# Patient Record
Sex: FEMALE | Race: WHITE | Employment: UNEMPLOYED | URBAN - NONMETROPOLITAN AREA
[De-identification: names, ages, dates, MRNs, and addresses within clinical notes are randomized per-mention and may not be internally consistent; named-entity substitution may affect disease eponyms.]

---

## 2016-02-26 LAB
CHOLESTEROL, TOTAL: 237 MG/DL
CHOLESTEROL/HDL RATIO: 6.8
HDLC SERPL-MCNC: 35 MG/DL (ref 35–70)
LDL CHOLESTEROL CALCULATED: 131 MG/DL (ref 0–160)
TRIGL SERPL-MCNC: 355 MG/DL
VLDLC SERPL CALC-MCNC: 71 MG/DL

## 2016-12-24 LAB
BUN BLDV-MCNC: NORMAL MG/DL
CALCIUM SERPL-MCNC: NORMAL MG/DL
CHLORIDE BLD-SCNC: NORMAL MMOL/L
CO2: NORMAL MMOL/L
CREAT SERPL-MCNC: NORMAL MG/DL
GFR CALCULATED: NORMAL
GLUCOSE BLD-MCNC: 105 MG/DL
POTASSIUM SERPL-SCNC: NORMAL MMOL/L
SODIUM BLD-SCNC: NORMAL MMOL/L

## 2017-05-25 ENCOUNTER — OFFICE VISIT (OUTPATIENT)
Dept: FAMILY MEDICINE CLINIC | Age: 82
End: 2017-05-25
Payer: MEDICARE

## 2017-05-25 VITALS
HEART RATE: 80 BPM | BODY MASS INDEX: 26.05 KG/M2 | DIASTOLIC BLOOD PRESSURE: 70 MMHG | SYSTOLIC BLOOD PRESSURE: 120 MMHG | WEIGHT: 147 LBS | HEIGHT: 63 IN

## 2017-05-25 DIAGNOSIS — C91.10 CHRONIC LYMPHATIC LEUKEMIA (HCC): ICD-10-CM

## 2017-05-25 DIAGNOSIS — E78.2 MIXED HYPERLIPIDEMIA: ICD-10-CM

## 2017-05-25 DIAGNOSIS — H40.9 GLAUCOMA OF BOTH EYES, UNSPECIFIED GLAUCOMA: ICD-10-CM

## 2017-05-25 DIAGNOSIS — I38 VALVULAR HEART DISEASE: ICD-10-CM

## 2017-05-25 DIAGNOSIS — I10 ESSENTIAL HYPERTENSION: ICD-10-CM

## 2017-05-25 PROCEDURE — 1090F PRES/ABSN URINE INCON ASSESS: CPT | Performed by: FAMILY MEDICINE

## 2017-05-25 PROCEDURE — G8420 CALC BMI NORM PARAMETERS: HCPCS | Performed by: FAMILY MEDICINE

## 2017-05-25 PROCEDURE — 1123F ACP DISCUSS/DSCN MKR DOCD: CPT | Performed by: FAMILY MEDICINE

## 2017-05-25 PROCEDURE — 4040F PNEUMOC VAC/ADMIN/RCVD: CPT | Performed by: FAMILY MEDICINE

## 2017-05-25 PROCEDURE — 99214 OFFICE O/P EST MOD 30 MIN: CPT | Performed by: FAMILY MEDICINE

## 2017-05-25 PROCEDURE — G8427 DOCREV CUR MEDS BY ELIG CLIN: HCPCS | Performed by: FAMILY MEDICINE

## 2017-05-25 PROCEDURE — G8400 PT W/DXA NO RESULTS DOC: HCPCS | Performed by: FAMILY MEDICINE

## 2017-05-25 PROCEDURE — 1036F TOBACCO NON-USER: CPT | Performed by: FAMILY MEDICINE

## 2017-05-25 RX ORDER — CALCIUM POLYCARBOPHIL 625 MG
625 TABLET ORAL DAILY
Qty: 14 TABLET | Refills: 0 | COMMUNITY
Start: 2017-05-25 | End: 2020-05-06

## 2017-05-25 RX ORDER — HYDROCHLOROTHIAZIDE 25 MG/1
TABLET ORAL
COMMUNITY
Start: 2017-05-08 | End: 2017-07-24 | Stop reason: SDUPTHER

## 2017-05-25 RX ORDER — MULTIVIT-MIN/IRON FUM/FOLIC AC 7.5 MG-4
1 TABLET ORAL DAILY
Qty: 30 TABLET | Refills: 3 | COMMUNITY
Start: 2017-05-25 | End: 2020-05-06 | Stop reason: ALTCHOICE

## 2017-05-25 RX ORDER — ASPIRIN 81 MG/1
81 TABLET ORAL DAILY
Qty: 30 TABLET | Refills: 3 | COMMUNITY
Start: 2017-05-25 | End: 2022-10-17

## 2017-05-25 RX ORDER — PYRIDOXINE HCL (VITAMIN B6) 100 MG
500 TABLET ORAL 2 TIMES DAILY
Qty: 60 CAPSULE | Refills: 3 | COMMUNITY
Start: 2017-05-25

## 2017-05-25 RX ORDER — CARVEDILOL 6.25 MG/1
TABLET ORAL
COMMUNITY
Start: 2017-05-08 | End: 2017-07-24 | Stop reason: SDUPTHER

## 2017-05-25 RX ORDER — BIMATOPROST 0.01 %
DROPS OPHTHALMIC (EYE)
COMMUNITY
Start: 2017-04-21

## 2017-05-25 RX ORDER — CHLORAL HYDRATE 500 MG
1000 CAPSULE ORAL DAILY
Qty: 90 CAPSULE | Refills: 3 | COMMUNITY
Start: 2017-05-25 | End: 2020-05-06 | Stop reason: ALTCHOICE

## 2017-05-25 RX ORDER — GREEN TEA/HOODIA GORDONII 315-12.5MG
1 CAPSULE ORAL DAILY
Qty: 60 TABLET | Refills: 0 | Status: SHIPPED | OUTPATIENT
Start: 2017-05-25 | End: 2020-05-06

## 2017-05-25 ASSESSMENT — ENCOUNTER SYMPTOMS
DIARRHEA: 0
SHORTNESS OF BREATH: 0
COUGH: 0
CHEST TIGHTNESS: 0
ABDOMINAL PAIN: 0
VOMITING: 0
NAUSEA: 0

## 2017-05-25 ASSESSMENT — PATIENT HEALTH QUESTIONNAIRE - PHQ9
SUM OF ALL RESPONSES TO PHQ9 QUESTIONS 1 & 2: 0
2. FEELING DOWN, DEPRESSED OR HOPELESS: 0
SUM OF ALL RESPONSES TO PHQ QUESTIONS 1-9: 0
1. LITTLE INTEREST OR PLEASURE IN DOING THINGS: 0

## 2017-07-03 ENCOUNTER — OFFICE VISIT (OUTPATIENT)
Dept: FAMILY MEDICINE CLINIC | Age: 82
End: 2017-07-03
Payer: MEDICARE

## 2017-07-03 VITALS
HEIGHT: 63 IN | DIASTOLIC BLOOD PRESSURE: 76 MMHG | BODY MASS INDEX: 30.48 KG/M2 | HEART RATE: 68 BPM | TEMPERATURE: 97.7 F | WEIGHT: 172 LBS | SYSTOLIC BLOOD PRESSURE: 128 MMHG

## 2017-07-03 DIAGNOSIS — J40 BRONCHITIS: Primary | ICD-10-CM

## 2017-07-03 PROCEDURE — G8400 PT W/DXA NO RESULTS DOC: HCPCS | Performed by: FAMILY MEDICINE

## 2017-07-03 PROCEDURE — G8427 DOCREV CUR MEDS BY ELIG CLIN: HCPCS | Performed by: FAMILY MEDICINE

## 2017-07-03 PROCEDURE — 1036F TOBACCO NON-USER: CPT | Performed by: FAMILY MEDICINE

## 2017-07-03 PROCEDURE — 99213 OFFICE O/P EST LOW 20 MIN: CPT | Performed by: FAMILY MEDICINE

## 2017-07-03 PROCEDURE — G8417 CALC BMI ABV UP PARAM F/U: HCPCS | Performed by: FAMILY MEDICINE

## 2017-07-03 PROCEDURE — 1090F PRES/ABSN URINE INCON ASSESS: CPT | Performed by: FAMILY MEDICINE

## 2017-07-03 PROCEDURE — 4040F PNEUMOC VAC/ADMIN/RCVD: CPT | Performed by: FAMILY MEDICINE

## 2017-07-03 PROCEDURE — 1123F ACP DISCUSS/DSCN MKR DOCD: CPT | Performed by: FAMILY MEDICINE

## 2017-07-03 RX ORDER — BENZONATATE 200 MG/1
200 CAPSULE ORAL 3 TIMES DAILY PRN
Qty: 30 CAPSULE | Refills: 0 | Status: SHIPPED | OUTPATIENT
Start: 2017-07-03 | End: 2017-07-10

## 2017-07-03 RX ORDER — ALBUTEROL SULFATE 90 UG/1
2 AEROSOL, METERED RESPIRATORY (INHALATION) EVERY 6 HOURS PRN
Qty: 1 INHALER | Refills: 3 | Status: SHIPPED | OUTPATIENT
Start: 2017-07-03 | End: 2020-05-06 | Stop reason: ALTCHOICE

## 2017-07-03 RX ORDER — AZITHROMYCIN 250 MG/1
250 TABLET, FILM COATED ORAL DAILY
Qty: 10 TABLET | Refills: 0 | Status: SHIPPED | OUTPATIENT
Start: 2017-07-03 | End: 2017-07-13

## 2017-07-03 ASSESSMENT — ENCOUNTER SYMPTOMS
TROUBLE SWALLOWING: 0
SHORTNESS OF BREATH: 0
SORE THROAT: 1
WHEEZING: 0

## 2017-07-24 RX ORDER — HYDROCHLOROTHIAZIDE 25 MG/1
TABLET ORAL
Qty: 90 TABLET | Refills: 3 | Status: SHIPPED | OUTPATIENT
Start: 2017-07-24 | End: 2018-07-17 | Stop reason: SDUPTHER

## 2017-07-24 RX ORDER — CARVEDILOL 6.25 MG/1
TABLET ORAL
Qty: 180 TABLET | Refills: 3 | Status: SHIPPED | OUTPATIENT
Start: 2017-07-24 | End: 2018-07-17 | Stop reason: SDUPTHER

## 2017-07-27 RX ORDER — FLUTICASONE PROPIONATE 50 MCG
SPRAY, SUSPENSION (ML) NASAL
Qty: 1 BOTTLE | Refills: 5 | Status: SHIPPED | OUTPATIENT
Start: 2017-07-27 | End: 2019-04-10 | Stop reason: SDUPTHER

## 2017-09-11 ENCOUNTER — OFFICE VISIT (OUTPATIENT)
Dept: PRIMARY CARE CLINIC | Age: 82
End: 2017-09-11
Payer: MEDICARE

## 2017-09-11 VITALS
RESPIRATION RATE: 16 BRPM | TEMPERATURE: 98.9 F | SYSTOLIC BLOOD PRESSURE: 140 MMHG | WEIGHT: 175 LBS | OXYGEN SATURATION: 96 % | BODY MASS INDEX: 31.01 KG/M2 | HEART RATE: 77 BPM | HEIGHT: 63 IN | DIASTOLIC BLOOD PRESSURE: 80 MMHG

## 2017-09-11 DIAGNOSIS — R53.83 FATIGUE, UNSPECIFIED TYPE: ICD-10-CM

## 2017-09-11 DIAGNOSIS — R51.9 HEADACHE, UNSPECIFIED HEADACHE TYPE: ICD-10-CM

## 2017-09-11 DIAGNOSIS — C91.10 CHRONIC LYMPHATIC LEUKEMIA (HCC): ICD-10-CM

## 2017-09-11 DIAGNOSIS — B34.9 VIRAL ILLNESS: Primary | ICD-10-CM

## 2017-09-11 PROCEDURE — G8400 PT W/DXA NO RESULTS DOC: HCPCS | Performed by: FAMILY MEDICINE

## 2017-09-11 PROCEDURE — 99213 OFFICE O/P EST LOW 20 MIN: CPT | Performed by: FAMILY MEDICINE

## 2017-09-11 PROCEDURE — 1090F PRES/ABSN URINE INCON ASSESS: CPT | Performed by: FAMILY MEDICINE

## 2017-09-11 PROCEDURE — 4040F PNEUMOC VAC/ADMIN/RCVD: CPT | Performed by: FAMILY MEDICINE

## 2017-09-11 PROCEDURE — G8427 DOCREV CUR MEDS BY ELIG CLIN: HCPCS | Performed by: FAMILY MEDICINE

## 2017-09-11 PROCEDURE — 1036F TOBACCO NON-USER: CPT | Performed by: FAMILY MEDICINE

## 2017-09-11 PROCEDURE — 1123F ACP DISCUSS/DSCN MKR DOCD: CPT | Performed by: FAMILY MEDICINE

## 2017-09-11 PROCEDURE — G8417 CALC BMI ABV UP PARAM F/U: HCPCS | Performed by: FAMILY MEDICINE

## 2017-09-11 ASSESSMENT — ENCOUNTER SYMPTOMS
DIARRHEA: 0
SINUS PRESSURE: 0
RHINORRHEA: 0
SHORTNESS OF BREATH: 1
NAUSEA: 0
SORE THROAT: 0
COUGH: 1
WHEEZING: 0
VOMITING: 0

## 2017-10-11 ENCOUNTER — OFFICE VISIT (OUTPATIENT)
Dept: FAMILY MEDICINE CLINIC | Age: 82
End: 2017-10-11
Payer: MEDICARE

## 2017-10-11 VITALS
WEIGHT: 174 LBS | HEART RATE: 72 BPM | OXYGEN SATURATION: 97 % | BODY MASS INDEX: 30.82 KG/M2 | DIASTOLIC BLOOD PRESSURE: 70 MMHG | TEMPERATURE: 97.9 F | SYSTOLIC BLOOD PRESSURE: 120 MMHG

## 2017-10-11 DIAGNOSIS — Z23 NEED FOR PROPHYLACTIC VACCINATION AND INOCULATION AGAINST INFLUENZA: ICD-10-CM

## 2017-10-11 DIAGNOSIS — J30.1 CHRONIC SEASONAL ALLERGIC RHINITIS DUE TO POLLEN: Primary | ICD-10-CM

## 2017-10-11 DIAGNOSIS — H69.82 EUSTACHIAN TUBE DYSFUNCTION, LEFT: ICD-10-CM

## 2017-10-11 PROCEDURE — 1090F PRES/ABSN URINE INCON ASSESS: CPT | Performed by: FAMILY MEDICINE

## 2017-10-11 PROCEDURE — 1123F ACP DISCUSS/DSCN MKR DOCD: CPT | Performed by: FAMILY MEDICINE

## 2017-10-11 PROCEDURE — G0008 ADMIN INFLUENZA VIRUS VAC: HCPCS | Performed by: FAMILY MEDICINE

## 2017-10-11 PROCEDURE — 4040F PNEUMOC VAC/ADMIN/RCVD: CPT | Performed by: FAMILY MEDICINE

## 2017-10-11 PROCEDURE — 1036F TOBACCO NON-USER: CPT | Performed by: FAMILY MEDICINE

## 2017-10-11 PROCEDURE — 99213 OFFICE O/P EST LOW 20 MIN: CPT | Performed by: FAMILY MEDICINE

## 2017-10-11 PROCEDURE — G8417 CALC BMI ABV UP PARAM F/U: HCPCS | Performed by: FAMILY MEDICINE

## 2017-10-11 PROCEDURE — 90662 IIV NO PRSV INCREASED AG IM: CPT | Performed by: FAMILY MEDICINE

## 2017-10-11 PROCEDURE — G8427 DOCREV CUR MEDS BY ELIG CLIN: HCPCS | Performed by: FAMILY MEDICINE

## 2017-10-11 PROCEDURE — G8484 FLU IMMUNIZE NO ADMIN: HCPCS | Performed by: FAMILY MEDICINE

## 2017-10-11 PROCEDURE — G8400 PT W/DXA NO RESULTS DOC: HCPCS | Performed by: FAMILY MEDICINE

## 2017-10-11 RX ORDER — LORATADINE 10 MG/1
10 TABLET ORAL DAILY
Qty: 30 TABLET | Refills: 3 | Status: SHIPPED | OUTPATIENT
Start: 2017-10-11 | End: 2018-03-19 | Stop reason: SDUPTHER

## 2017-10-11 RX ORDER — MONTELUKAST SODIUM 10 MG/1
10 TABLET ORAL NIGHTLY
Qty: 30 TABLET | Refills: 3 | Status: SHIPPED | OUTPATIENT
Start: 2017-10-11 | End: 2018-04-02 | Stop reason: SDUPTHER

## 2017-10-11 ASSESSMENT — ENCOUNTER SYMPTOMS
COUGH: 1
VOMITING: 0
RHINORRHEA: 0
SORE THROAT: 0

## 2017-10-11 NOTE — PATIENT INSTRUCTIONS
Should use the rescue inhaler (albuterol) whenever she feels out of breath or the coughing increases.   Stop the over the counter allergy medication and use the allergy medication started today (loratadine) and the montelukast.    Systane eye drops as needed -- can buy this over the counter

## 2017-10-11 NOTE — PROGRESS NOTES
[address] [facility]  [address]  Dept: [phone]  Dept Fax: [phone]    Mario Chen is a 80 y.o. female who presents today for her medical conditions/complaints as noted below. Mario Chen is c/o of Otalgia (bilateral, went to Urgent care for her ears)      HPI:     Otalgia    There is pain in both (ears itch, ache not really severe pain) ears. This is a chronic problem. The current episode started 1 to 4 weeks ago. The problem occurs constantly. The problem has been waxing and waning. There has been no fever. Associated symptoms include coughing. Pertinent negatives include no ear discharge, neck pain, rash, rhinorrhea, sore throat or vomiting. Associated symptoms comments: Had a cough a few weeks ago but this has resolved. Has been more fatigued as well. Does have to clear her throat frequently. Feels alittle on the hoarse. Treatments tried: OTC allergy 2 pills every 6 hours. Has been using her fluticasone regularly. The treatment provided no relief. There is no history of hearing loss. Has had this off and on for the last several years. Did see ENT for this 2-3 years.     BP Readings from Last 3 Encounters:   10/11/17 120/70   09/11/17 (!) 140/80   07/03/17 128/76          (goal 120/80)    Past Medical History:   Diagnosis Date    Cancer [facility])     Skin cancer    Chronic lymphatic leukemia ([facility] [address]     Glaucoma     Hyperlipidemia     Hypertension     Thyroid nodule     Valvular heart disease       Past Surgical History:   Procedure Laterality Date    BRAIN SURGERY  2010    meningioma    CATARACT REMOVAL Bilateral 2012    CYST REMOVAL  1996    pancreatic    HYSTERECTOMY      JOINT REPLACEMENT      KNEE ARTHROSCOPY  1999    STERNUM FRACTURE SURGERY  1996       Family History   Problem Relation Age of Onset    Cancer Mother     Heart Disease Father     High Blood Pressure Father     Stroke Father     Coronary Art Dis Father        Social History   Substance Use Topics    Smoking status: Never Smoker    Smokeless tobacco: Never Used    Alcohol use No      Current Outpatient Prescriptions   Medication Sig Dispense Refill    montelukast (SINGULAIR) 10 MG tablet Take 1 tablet by mouth nightly 30 tablet 3    loratadine (CLARITIN) 10 MG tablet Take 1 tablet by mouth daily 30 tablet 3    fluticasone (FLONASE) 50 MCG/ACT nasal spray instill 2 sprays into each nostril once daily 1 Bottle 5    carvedilol (COREG) 6.25 MG tablet TAKE 1 TABLET TWICE DAILY 180 tablet 3    hydrochlorothiazide (HYDRODIURIL) 25 MG tablet TAKE 1 TABLET ONE TIME DAILY 90 tablet 3    albuterol sulfate HFA (VENTOLIN HFA) 108 (90 Base) MCG/ACT inhaler Inhale 2 puffs into the lungs every 6 hours as needed for Wheezing 1 Inhaler 3    LUMIGAN 0.01 % SOLN ophthalmic drops       Cranberry 500 MG CAPS Take 500 mg by mouth 2 times daily 60 capsule 3    Omega-3 Fatty Acids (FISH OIL) 1000 MG CAPS Take 1 capsule by mouth daily 90 capsule 3    Multiple Vitamins-Minerals (MULTIVITAMIN WITH MINERALS) tablet Take 1 tablet by mouth daily 30 tablet 3    Glucosamine-Chondroitin 250-200 MG TABS Take 2 capsules by mouth daily 60 tablet 0    Calcium Polycarbophil (FIBER) 625 MG TABS Take 1 tablet by mouth daily 14 tablet 0    Lactobacillus (PROBIOTIC ACIDOPHILUS) TABS Take 1 tablet by mouth daily 60 tablet 0    aspirin EC 81 MG EC tablet Take 1 tablet by mouth daily 30 tablet 3     No current facility-administered medications for this visit. Allergies   Allergen Reactions    Gabapentin     Prednisone     Vicodin [Hydrocodone-Acetaminophen]        Health Maintenance   Topic Date Due    DEXA (modify frequency per FRAX score)  11/22/1999    DTaP/Tdap/Td vaccine (1 - Tdap) 04/11/2005    Flu vaccine (1) 09/01/2017    Pneumococcal high/highest risk  Completed       Subjective:      Review of Systems   HENT: Positive for ear pain.  Negative for ear discharge, rhinorrhea and sore throat. Respiratory: Positive for cough. Gastrointestinal: Negative for vomiting. Musculoskeletal: Negative for neck pain. Skin: Negative for rash. Objective:     /70   Pulse 72   Temp 97.9 °F (36.6 °C) (Tympanic)   Wt 174 lb (78.9 kg)   SpO2 97%   BMI 30.82 kg/m²     Physical Exam   Constitutional: She is oriented to person, place, and time. She appears well-developed and well-nourished. HENT:   Head: Normocephalic and atraumatic. Right Ear: External ear and ear canal normal. No drainage or tenderness. Tympanic membrane is retracted. No middle ear effusion. Decreased hearing is noted. Left Ear: External ear and ear canal normal. No drainage or tenderness. Tympanic membrane is retracted. No middle ear effusion. Decreased hearing is noted. Eyes: Conjunctivae and EOM are normal.   Neck: Normal range of motion. Neck supple. No JVD present. No thyromegaly present. Cardiovascular: Normal rate, regular rhythm, S1 normal and intact distal pulses. Exam reveals no gallop and no friction rub. Murmur heard. Systolic murmur is present with a grade of 2/6   Prominent S2 at the RSB and soft systolic murmur   Pulmonary/Chest: Effort normal and breath sounds normal. No respiratory distress. Abdominal: Soft. Musculoskeletal: She exhibits no edema. Lymphadenopathy:     She has no cervical adenopathy. Neurological: She is alert and oriented to person, place, and time. Skin: Skin is warm. Nursing note and vitals reviewed. Assessment/Plan:     1. Seasonal allergic rhinitis due to pollen  montelukast (SINGULAIR) 10 MG tablet    loratadine (CLARITIN) 10 MG tablet   2. Eustachian tube dysfunction, left  montelukast (SINGULAIR) 10 MG tablet    loratadine (CLARITIN) 10 MG tablet     Patient Instructions   Should use the rescue inhaler (albuterol) whenever she feels out of breath or the coughing increases.   Stop the over the counter allergy medication and use the

## 2017-11-27 ENCOUNTER — OFFICE VISIT (OUTPATIENT)
Dept: FAMILY MEDICINE CLINIC | Age: 82
End: 2017-11-27
Payer: MEDICARE

## 2017-11-27 VITALS
OXYGEN SATURATION: 97 % | HEART RATE: 77 BPM | BODY MASS INDEX: 31 KG/M2 | DIASTOLIC BLOOD PRESSURE: 78 MMHG | TEMPERATURE: 98.3 F | SYSTOLIC BLOOD PRESSURE: 128 MMHG | WEIGHT: 175 LBS

## 2017-11-27 DIAGNOSIS — C91.10 CHRONIC LYMPHATIC LEUKEMIA (HCC): ICD-10-CM

## 2017-11-27 DIAGNOSIS — E78.2 MIXED HYPERLIPIDEMIA: ICD-10-CM

## 2017-11-27 DIAGNOSIS — I10 ESSENTIAL HYPERTENSION: Primary | ICD-10-CM

## 2017-11-27 DIAGNOSIS — I38 VALVULAR HEART DISEASE: ICD-10-CM

## 2017-11-27 PROCEDURE — G8427 DOCREV CUR MEDS BY ELIG CLIN: HCPCS | Performed by: FAMILY MEDICINE

## 2017-11-27 PROCEDURE — 4040F PNEUMOC VAC/ADMIN/RCVD: CPT | Performed by: FAMILY MEDICINE

## 2017-11-27 PROCEDURE — 1036F TOBACCO NON-USER: CPT | Performed by: FAMILY MEDICINE

## 2017-11-27 PROCEDURE — 99214 OFFICE O/P EST MOD 30 MIN: CPT | Performed by: FAMILY MEDICINE

## 2017-11-27 PROCEDURE — G8417 CALC BMI ABV UP PARAM F/U: HCPCS | Performed by: FAMILY MEDICINE

## 2017-11-27 PROCEDURE — G8400 PT W/DXA NO RESULTS DOC: HCPCS | Performed by: FAMILY MEDICINE

## 2017-11-27 PROCEDURE — 1123F ACP DISCUSS/DSCN MKR DOCD: CPT | Performed by: FAMILY MEDICINE

## 2017-11-27 PROCEDURE — G8484 FLU IMMUNIZE NO ADMIN: HCPCS | Performed by: FAMILY MEDICINE

## 2017-11-27 PROCEDURE — 1090F PRES/ABSN URINE INCON ASSESS: CPT | Performed by: FAMILY MEDICINE

## 2017-11-27 ASSESSMENT — ENCOUNTER SYMPTOMS
CONSTIPATION: 0
DIARRHEA: 0
SHORTNESS OF BREATH: 0
ABDOMINAL PAIN: 0

## 2017-11-27 NOTE — PROGRESS NOTES
[address] [place] [address] [address]  Dept: [phone]  Dept Fax: [phone]    Tatyana Evans is a 80 y.o. female who presents today for her medical conditions/complaints as noted below. Tatyana Evans is c/o of Hypertension      HPI:     Hypertension   This is a chronic problem. The current episode started more than 1 year ago. The problem is unchanged. Associated symptoms include malaise/fatigue. Pertinent negatives include no peripheral edema. There are no associated agents to hypertension. Risk factors for coronary artery disease include post-menopausal state and sedentary lifestyle. Past treatments include diuretics. The current treatment provides significant improvement. There are no compliance problems. Ears are better than they were at her last. After the freeze seemed to have completely cleared up. Had her cataracts a few years ago and is recurred. Dr. Susan Blanc in [facility] pod [place]. It is being removed again next Monday. Also has the Glaucoma which he is following.     BP Readings from Last 3 Encounters:   11/27/17 128/78   10/11/17 120/70   09/11/17 (!) 140/80          (goal 120/80)    Past Medical History:   Diagnosis Date    Cancer [facility])     Skin cancer    Chronic lymphatic leukemia ([facility] [address]     Glaucoma     Hyperlipidemia     Hypertension     Thyroid nodule     Valvular heart disease       Past Surgical History:   Procedure Laterality Date    BRAIN SURGERY  2010    meningioma    CATARACT REMOVAL Bilateral 2012    CYST REMOVAL  1996    pancreatic    HYSTERECTOMY      JOINT REPLACEMENT      KNEE ARTHROSCOPY  1999    [address]       Family History   Problem Relation Age of Onset    Cancer Mother     Heart Disease Father     High Blood Pressure Father     Stroke Father     Coronary Art Dis Father        Social History   Substance Use Topics    Smoking status: Never Smoker    Smokeless tobacco: Never Used   [facility] Alcohol use No      Current Outpatient Prescriptions   Medication Sig Dispense Refill    montelukast (SINGULAIR) 10 MG tablet Take 1 tablet by mouth nightly 30 tablet 3    loratadine (CLARITIN) 10 MG tablet Take 1 tablet by mouth daily 30 tablet 3    fluticasone (FLONASE) 50 MCG/ACT nasal spray instill 2 sprays into each nostril once daily 1 Bottle 5    carvedilol (COREG) 6.25 MG tablet TAKE 1 TABLET TWICE DAILY 180 tablet 3    hydrochlorothiazide (HYDRODIURIL) 25 MG tablet TAKE 1 TABLET ONE TIME DAILY 90 tablet 3    albuterol sulfate HFA (VENTOLIN HFA) 108 (90 Base) MCG/ACT inhaler Inhale 2 puffs into the lungs every 6 hours as needed for Wheezing 1 Inhaler 3    LUMIGAN 0.01 % SOLN ophthalmic drops       Cranberry 500 MG CAPS Take 500 mg by mouth 2 times daily 60 capsule 3    Omega-3 Fatty Acids (FISH OIL) 1000 MG CAPS Take 1 capsule by mouth daily 90 capsule 3    Multiple Vitamins-Minerals (MULTIVITAMIN WITH MINERALS) tablet Take 1 tablet by mouth daily 30 tablet 3    Glucosamine-Chondroitin 250-200 MG TABS Take 2 capsules by mouth daily 60 tablet 0    Calcium Polycarbophil (FIBER) 625 MG TABS Take 1 tablet by mouth daily 14 tablet 0    Lactobacillus (PROBIOTIC ACIDOPHILUS) TABS Take 1 tablet by mouth daily 60 tablet 0    aspirin EC 81 MG EC tablet Take 1 tablet by mouth daily 30 tablet 3     No current facility-administered medications for this visit. Allergies   Allergen Reactions    Gabapentin     Prednisone     Vicodin [Hydrocodone-Acetaminophen]        Health Maintenance   Topic Date Due    DEXA (modify frequency per FRAX score)  11/22/1999    DTaP/Tdap/Td vaccine (1 - Tdap) 04/11/2005    Flu vaccine  Completed    Pneumococcal high/highest risk  Completed       Subjective:      Review of Systems   Constitutional: Positive for malaise/fatigue. Constitutional: Negative for chills, diaphoresis and fatigue. Respiratory: Negative for shortness of breath.     Cardiovascular: Negative 11/13/2017    ALT 71 (H) 11/13/2017    AST 45 (H) 11/13/2017     11/13/2017    K 3.5 (L) 11/13/2017     11/13/2017    CREATININE 1.0 11/13/2017    BUN 23 (H) 11/13/2017    CO2 28 11/13/2017       Return in about 6 months (around 5/27/2018) for AWV. Patient given educational materials - see patient instructions. Discussed use, benefit, and side effects of prescribed medications. All patient questions answered. Pt voiced understanding. Reviewed health maintenance. Instructed to continue current medications, diet and exercise. Patient agreed with treatment plan. Follow up as directed.      Electronically signed by Brandon Harris MD on 11/27/2017

## 2017-11-27 NOTE — PROGRESS NOTES
Review of Systems   Constitutional: Negative for chills, diaphoresis and fatigue. Respiratory: Negative for shortness of breath. Cardiovascular: Negative for chest pain and palpitations. Gastrointestinal: Negative for abdominal pain, constipation and diarrhea. Genitourinary: Negative for frequency and urgency. Musculoskeletal: Negative for arthralgias and joint swelling. Neurological: Negative for dizziness. Psychiatric/Behavioral: Negative for sleep disturbance (sleeps 3-5 hours wakes up then falls back to sleep).

## 2017-12-18 ENCOUNTER — OFFICE VISIT (OUTPATIENT)
Dept: FAMILY MEDICINE CLINIC | Age: 82
End: 2017-12-18
Payer: MEDICARE

## 2017-12-18 VITALS
HEART RATE: 85 BPM | BODY MASS INDEX: 31.35 KG/M2 | TEMPERATURE: 98.7 F | WEIGHT: 177 LBS | DIASTOLIC BLOOD PRESSURE: 75 MMHG | RESPIRATION RATE: 12 BRPM | SYSTOLIC BLOOD PRESSURE: 148 MMHG

## 2017-12-18 DIAGNOSIS — N39.0 URINARY TRACT INFECTION WITH HEMATURIA, SITE UNSPECIFIED: Primary | ICD-10-CM

## 2017-12-18 DIAGNOSIS — R31.9 URINARY TRACT INFECTION WITH HEMATURIA, SITE UNSPECIFIED: Primary | ICD-10-CM

## 2017-12-18 LAB
BILIRUBIN, POC: ABNORMAL
BLOOD URINE, POC: ABNORMAL
CLARITY, POC: CLEAR
COLOR, POC: YELLOW
GLUCOSE URINE, POC: ABNORMAL
KETONES, POC: ABNORMAL
LEUKOCYTE EST, POC: ABNORMAL
NITRITE, POC: ABNORMAL
PH, POC: 5
PROTEIN, POC: ABNORMAL
SPECIFIC GRAVITY, POC: 1
URINE CULTURE, ROUTINE: NORMAL
UROBILINOGEN, POC: ABNORMAL

## 2017-12-18 PROCEDURE — G8417 CALC BMI ABV UP PARAM F/U: HCPCS | Performed by: NURSE PRACTITIONER

## 2017-12-18 PROCEDURE — 1123F ACP DISCUSS/DSCN MKR DOCD: CPT | Performed by: NURSE PRACTITIONER

## 2017-12-18 PROCEDURE — G8484 FLU IMMUNIZE NO ADMIN: HCPCS | Performed by: NURSE PRACTITIONER

## 2017-12-18 PROCEDURE — 1090F PRES/ABSN URINE INCON ASSESS: CPT | Performed by: NURSE PRACTITIONER

## 2017-12-18 PROCEDURE — 1036F TOBACCO NON-USER: CPT | Performed by: NURSE PRACTITIONER

## 2017-12-18 PROCEDURE — G8400 PT W/DXA NO RESULTS DOC: HCPCS | Performed by: NURSE PRACTITIONER

## 2017-12-18 PROCEDURE — 81002 URINALYSIS NONAUTO W/O SCOPE: CPT | Performed by: NURSE PRACTITIONER

## 2017-12-18 PROCEDURE — 99213 OFFICE O/P EST LOW 20 MIN: CPT | Performed by: NURSE PRACTITIONER

## 2017-12-18 PROCEDURE — G8427 DOCREV CUR MEDS BY ELIG CLIN: HCPCS | Performed by: NURSE PRACTITIONER

## 2017-12-18 PROCEDURE — 4040F PNEUMOC VAC/ADMIN/RCVD: CPT | Performed by: NURSE PRACTITIONER

## 2017-12-18 RX ORDER — NITROFURANTOIN 25; 75 MG/1; MG/1
100 CAPSULE ORAL 2 TIMES DAILY
Qty: 14 CAPSULE | Refills: 0 | Status: SHIPPED | OUTPATIENT
Start: 2017-12-18 | End: 2017-12-25

## 2017-12-18 ASSESSMENT — ENCOUNTER SYMPTOMS
WHEEZING: 0
BACK PAIN: 0
DIARRHEA: 0
NAUSEA: 0
COUGH: 0
ABDOMINAL PAIN: 1
VOMITING: 0
SHORTNESS OF BREATH: 0

## 2017-12-18 NOTE — PROGRESS NOTES
[address] [place] [address] [address]  Dept: [phone]  Dept Fax: [phone]      Pankaj Martinez is a 80 y.o. female who presents today for her medical conditions/complaints as noted below. Chief Complaint   Patient presents with    Urinary Tract Infection       HPI:     Dysuria    This is a new problem. The current episode started in the past 7 days (2 days ago). The problem occurs every urination. The problem has been gradually worsening. The quality of the pain is described as burning. The pain is at a severity of 5/10. The pain is moderate. There has been no fever. Associated symptoms include frequency and urgency. Pertinent negatives include no chills, hematuria, hesitancy, nausea or vomiting. She has tried increased fluids for the symptoms. The treatment provided no relief.        Current Outpatient Prescriptions   Medication Sig Dispense Refill    nitrofurantoin, macrocrystal-monohydrate, (MACROBID) 100 MG capsule Take 1 capsule by mouth 2 times daily for 7 days 14 capsule 0    montelukast (SINGULAIR) 10 MG tablet Take 1 tablet by mouth nightly 30 tablet 3    loratadine (CLARITIN) 10 MG tablet Take 1 tablet by mouth daily 30 tablet 3    fluticasone (FLONASE) 50 MCG/ACT nasal spray instill 2 sprays into each nostril once daily 1 Bottle 5    carvedilol (COREG) 6.25 MG tablet TAKE 1 TABLET TWICE DAILY 180 tablet 3    hydrochlorothiazide (HYDRODIURIL) 25 MG tablet TAKE 1 TABLET ONE TIME DAILY 90 tablet 3    albuterol sulfate HFA (VENTOLIN HFA) 108 (90 Base) MCG/ACT inhaler Inhale 2 puffs into the lungs every 6 hours as needed for Wheezing 1 Inhaler 3    LUMIGAN 0.01 % SOLN ophthalmic drops       Cranberry 500 MG CAPS Take 500 mg by mouth 2 times daily 60 capsule 3    Omega-3 Fatty Acids (FISH OIL) 1000 MG CAPS Take 1 capsule by mouth daily 90 capsule 3    Multiple Vitamins-Minerals (MULTIVITAMIN WITH MINERALS) tablet Take 1 tablet by mouth daily 30 tablet 3    Glucosamine-Chondroitin 250-200 MG TABS Take 2 capsules by mouth daily 60 tablet 0    Calcium Polycarbophil (FIBER) 625 MG TABS Take 1 tablet by mouth daily 14 tablet 0    Lactobacillus (PROBIOTIC ACIDOPHILUS) TABS Take 1 tablet by mouth daily 60 tablet 0    aspirin EC 81 MG EC tablet Take 1 tablet by mouth daily 30 tablet 3     No current facility-administered medications for this visit. Allergies   Allergen Reactions    Atorvastatin      Other reaction(s): Muscle cramps    Gabapentin     Prednisone     Vicodin [Hydrocodone-Acetaminophen]        Past Medical History:   Diagnosis Date    Cancer [facility])     Skin cancer    Chronic lymphatic leukemia ([facility] [address]     Glaucoma     Hyperlipidemia     Hypertension     Thyroid nodule     Valvular heart disease      Past Surgical History:   Procedure Laterality Date    BRAIN SURGERY  2010    meningioma    CATARACT REMOVAL Bilateral 2012    CYST REMOVAL  1996    pancreatic    HYSTERECTOMY      JOINT REPLACEMENT      KNEE ARTHROSCOPY  1999    STERNUM FRACTURE SURGERY  1996     Social History     Social History    Marital status:      Spouse name: N/A    Number of children: N/A    Years of education: N/A     Occupational History    Not on file. Social History Main Topics    Smoking status: Never Smoker    Smokeless tobacco: Never Used    Alcohol use No    Drug use: No    Sexual activity: Not on file     Other Topics Concern    Not on file     Social History Narrative    No narrative on file     Family History   Problem Relation Age of Onset    Cancer Mother     Heart Disease Father     High Blood Pressure Father     Stroke Father     Coronary Art Dis Father        Subjective:      Review of Systems   Constitutional: Positive for fatigue. Negative for appetite change, chills and fever. HENT: Negative. Respiratory: Negative for cough, shortness of breath and wheezing.     Cardiovascular: Negative for chest pain. Gastrointestinal: Positive for abdominal pain (lower). Negative for diarrhea, nausea and vomiting. Genitourinary: Positive for dysuria, frequency and urgency. Negative for hematuria and hesitancy. Musculoskeletal: Negative for back pain and myalgias. Objective:     BP (!) 148/75 (Site: Left Arm, Position: Sitting, Cuff Size: Large Adult)   Pulse 85   Temp 98.7 °F (37.1 °C) (Tympanic)   Resp 12   Wt 177 lb (80.3 kg)   BMI 31.35 kg/m²     Physical Exam   Constitutional: She is oriented to person, place, and time. She appears well-developed and well-nourished. HENT:   Head: Normocephalic. Neck: Neck supple. Cardiovascular: Normal rate, regular rhythm and normal heart sounds. Pulmonary/Chest: Effort normal and breath sounds normal. No respiratory distress. She has no wheezes. Abdominal: Soft. Bowel sounds are normal. There is tenderness in the suprapubic area. There is no CVA tenderness. Neurological: She is alert and oriented to person, place, and time. Assessment:     1. Urinary tract infection with hematuria, site unspecified  nitrofurantoin, macrocrystal-monohydrate, (MACROBID) 100 MG capsule    POCT Urinalysis no Micro    Urine Culture     Results for POC orders placed in visit on 12/18/17   POCT Urinalysis no Micro   Result Value Ref Range    Color, UA yellow     Clarity, UA clear     Glucose, UA POC neg     Bilirubin, UA neg     Ketones, UA neg     Spec Grav, UA 1.000     Blood, UA POC +++     pH, UA 5     Protein, UA POC neg     Urobilinogen, UA neg     Leukocytes, UA +++     Nitrite, UA neg        Plan:     Return if symptoms worsen or fail to improve. Orders Placed This Encounter   Medications    nitrofurantoin, macrocrystal-monohydrate, (MACROBID) 100 MG capsule     Sig: Take 1 capsule by mouth 2 times daily for 7 days     Dispense:  14 capsule     Refill:  0      Patient given educational materials - see patient instructions.   Discussed use, benefit, and side effects of prescribed medications. Instructed to increase fluids. Monitor for worsening symptoms, call office with any concerns. All patient questions answered. Patient voiced understanding. Follow up as directed.      Electronically signed by Rufina Arizmendi CNP on 12/18/2017 at 12:04 PM

## 2017-12-18 NOTE — PATIENT INSTRUCTIONS
from front to back. When should you call for help? Call your doctor now or seek immediate medical care if:  ? · Symptoms such as fever, chills, nausea, or vomiting get worse or appear for the first time. ? · You have new pain in your back just below your rib cage. This is called flank pain. ? · There is new blood or pus in your urine. ? · You have any problems with your antibiotic medicine. ? Watch closely for changes in your health, and be sure to contact your doctor if:  ? · You are not getting better after taking an antibiotic for 2 days. ? · Your symptoms go away but then come back. Where can you learn more? Go to [email or web address] org and sign in to your [employer] account. Enter P008 in the [employer] box to learn more about \"Urinary Tract Infection in Women: Care Instructions. \"     If you do not have an account, please click on the \"Sign Up Now\" link. Current as of: May 12, 2017  Content Version: 11.4  © 0327-9446 Healthwise, Incorporated. Care instructions adapted under license by [facility] ([facility]). If you have questions about a medical condition or this instruction, always ask your healthcare professional. [place] [address] any warranty or liability for your use of this information.

## 2018-01-10 VITALS
DIASTOLIC BLOOD PRESSURE: 68 MMHG | BODY MASS INDEX: 31.01 KG/M2 | WEIGHT: 175 LBS | SYSTOLIC BLOOD PRESSURE: 118 MMHG | HEIGHT: 63 IN | HEART RATE: 78 BPM | TEMPERATURE: 97.3 F

## 2018-01-10 DIAGNOSIS — M48.061 FORAMINAL STENOSIS OF LUMBAR REGION: ICD-10-CM

## 2018-01-10 DIAGNOSIS — E04.1 CYST OF THYROID: ICD-10-CM

## 2018-01-10 DIAGNOSIS — M54.16 RADICULOPATHY, LUMBAR REGION: ICD-10-CM

## 2018-01-10 PROBLEM — K59.00 CONSTIPATION: Status: ACTIVE | Noted: 2018-01-10

## 2018-01-10 PROBLEM — F41.9 ANXIETY DISORDER: Status: ACTIVE | Noted: 2018-01-10

## 2018-01-10 PROBLEM — D72.829 ELEVATED WHITE BLOOD CELL COUNT: Status: ACTIVE | Noted: 2018-01-10

## 2018-01-10 RX ORDER — MULTIVIT-MIN/IRON/FOLIC ACID/K 18-600-40
CAPSULE ORAL DAILY
COMMUNITY

## 2018-01-10 RX ORDER — MELOXICAM 7.5 MG/1
7.5 TABLET ORAL DAILY
COMMUNITY
End: 2018-03-19 | Stop reason: SDUPTHER

## 2018-01-10 RX ORDER — AZITHROMYCIN 250 MG/1
250 TABLET, FILM COATED ORAL DAILY
COMMUNITY
End: 2018-03-19 | Stop reason: ALTCHOICE

## 2018-03-19 ENCOUNTER — OFFICE VISIT (OUTPATIENT)
Dept: FAMILY MEDICINE CLINIC | Age: 83
End: 2018-03-19
Payer: MEDICARE

## 2018-03-19 VITALS
BODY MASS INDEX: 31.18 KG/M2 | HEART RATE: 78 BPM | WEIGHT: 176 LBS | SYSTOLIC BLOOD PRESSURE: 146 MMHG | DIASTOLIC BLOOD PRESSURE: 80 MMHG | OXYGEN SATURATION: 97 %

## 2018-03-19 DIAGNOSIS — H69.82 EUSTACHIAN TUBE DYSFUNCTION, LEFT: ICD-10-CM

## 2018-03-19 DIAGNOSIS — R19.00 ABDOMINAL WALL MASS OF LEFT FLANK: ICD-10-CM

## 2018-03-19 DIAGNOSIS — J30.1 CHRONIC SEASONAL ALLERGIC RHINITIS DUE TO POLLEN: Primary | ICD-10-CM

## 2018-03-19 DIAGNOSIS — M26.623 BILATERAL TEMPOROMANDIBULAR JOINT PAIN: ICD-10-CM

## 2018-03-19 PROCEDURE — G8417 CALC BMI ABV UP PARAM F/U: HCPCS | Performed by: FAMILY MEDICINE

## 2018-03-19 PROCEDURE — 99214 OFFICE O/P EST MOD 30 MIN: CPT | Performed by: FAMILY MEDICINE

## 2018-03-19 PROCEDURE — G8482 FLU IMMUNIZE ORDER/ADMIN: HCPCS | Performed by: FAMILY MEDICINE

## 2018-03-19 PROCEDURE — 1036F TOBACCO NON-USER: CPT | Performed by: FAMILY MEDICINE

## 2018-03-19 PROCEDURE — 1123F ACP DISCUSS/DSCN MKR DOCD: CPT | Performed by: FAMILY MEDICINE

## 2018-03-19 PROCEDURE — 1090F PRES/ABSN URINE INCON ASSESS: CPT | Performed by: FAMILY MEDICINE

## 2018-03-19 PROCEDURE — 4040F PNEUMOC VAC/ADMIN/RCVD: CPT | Performed by: FAMILY MEDICINE

## 2018-03-19 PROCEDURE — G8427 DOCREV CUR MEDS BY ELIG CLIN: HCPCS | Performed by: FAMILY MEDICINE

## 2018-03-19 PROCEDURE — G8400 PT W/DXA NO RESULTS DOC: HCPCS | Performed by: FAMILY MEDICINE

## 2018-03-19 RX ORDER — MELOXICAM 7.5 MG/1
7.5 TABLET ORAL DAILY
Qty: 30 TABLET | Refills: 1 | Status: SHIPPED | OUTPATIENT
Start: 2018-03-19 | End: 2019-05-13 | Stop reason: ALTCHOICE

## 2018-03-19 RX ORDER — LORATADINE 10 MG/1
10 TABLET ORAL DAILY
Qty: 30 TABLET | Refills: 3 | Status: SHIPPED | OUTPATIENT
Start: 2018-03-19 | End: 2020-05-06

## 2018-03-19 ASSESSMENT — ENCOUNTER SYMPTOMS
SORE THROAT: 0
RHINORRHEA: 0
COUGH: 0

## 2018-03-19 NOTE — PROGRESS NOTES
(H) 11/13/2017    AST 45 (H) 11/13/2017     11/13/2017    K 3.5 (L) 11/13/2017     11/13/2017    CREATININE 1.0 11/13/2017    BUN 23 (H) 11/13/2017    CO2 28 11/13/2017       Return in about 2 weeks (around 4/2/2018). Patient given educational materials - see patient instructions. Discussed use, benefit, and side effects of prescribed medications. All patient questions answered. Pt voiced understanding. Reviewed health maintenance. Instructed to continue current medications, diet and exercise. Patient agreed with treatment plan. Follow up as directed.      Electronically signed by Jose Ervin MD on 3/25/2018

## 2018-04-02 ENCOUNTER — OFFICE VISIT (OUTPATIENT)
Dept: FAMILY MEDICINE CLINIC | Age: 83
End: 2018-04-02
Payer: MEDICARE

## 2018-04-02 VITALS
SYSTOLIC BLOOD PRESSURE: 138 MMHG | HEART RATE: 68 BPM | HEIGHT: 63 IN | WEIGHT: 180 LBS | BODY MASS INDEX: 31.89 KG/M2 | DIASTOLIC BLOOD PRESSURE: 82 MMHG

## 2018-04-02 DIAGNOSIS — H69.82 EUSTACHIAN TUBE DYSFUNCTION, LEFT: ICD-10-CM

## 2018-04-02 DIAGNOSIS — J30.1 CHRONIC SEASONAL ALLERGIC RHINITIS DUE TO POLLEN: ICD-10-CM

## 2018-04-02 PROCEDURE — 4040F PNEUMOC VAC/ADMIN/RCVD: CPT | Performed by: FAMILY MEDICINE

## 2018-04-02 PROCEDURE — 99213 OFFICE O/P EST LOW 20 MIN: CPT | Performed by: FAMILY MEDICINE

## 2018-04-02 PROCEDURE — 1090F PRES/ABSN URINE INCON ASSESS: CPT | Performed by: FAMILY MEDICINE

## 2018-04-02 PROCEDURE — 1036F TOBACCO NON-USER: CPT | Performed by: FAMILY MEDICINE

## 2018-04-02 PROCEDURE — 1123F ACP DISCUSS/DSCN MKR DOCD: CPT | Performed by: FAMILY MEDICINE

## 2018-04-02 PROCEDURE — G8417 CALC BMI ABV UP PARAM F/U: HCPCS | Performed by: FAMILY MEDICINE

## 2018-04-02 PROCEDURE — G8427 DOCREV CUR MEDS BY ELIG CLIN: HCPCS | Performed by: FAMILY MEDICINE

## 2018-04-02 PROCEDURE — G8400 PT W/DXA NO RESULTS DOC: HCPCS | Performed by: FAMILY MEDICINE

## 2018-04-02 RX ORDER — MONTELUKAST SODIUM 10 MG/1
10 TABLET ORAL NIGHTLY
Qty: 30 TABLET | Refills: 11 | Status: SHIPPED | OUTPATIENT
Start: 2018-04-02 | End: 2018-05-30 | Stop reason: SDUPTHER

## 2018-04-02 ASSESSMENT — ENCOUNTER SYMPTOMS: ABDOMINAL PAIN: 0

## 2018-05-30 ENCOUNTER — OFFICE VISIT (OUTPATIENT)
Dept: FAMILY MEDICINE CLINIC | Age: 83
End: 2018-05-30
Payer: MEDICARE

## 2018-05-30 VITALS
SYSTOLIC BLOOD PRESSURE: 120 MMHG | HEIGHT: 63 IN | WEIGHT: 164 LBS | BODY MASS INDEX: 29.06 KG/M2 | HEART RATE: 72 BPM | DIASTOLIC BLOOD PRESSURE: 74 MMHG

## 2018-05-30 DIAGNOSIS — Z23 NEED FOR PROPHYLACTIC VACCINATION AGAINST DIPHTHERIA-TETANUS-PERTUSSIS (DTP): ICD-10-CM

## 2018-05-30 DIAGNOSIS — Z23 NEED FOR PROPHYLACTIC VACCINATION AND INOCULATION AGAINST VARICELLA: ICD-10-CM

## 2018-05-30 DIAGNOSIS — Z00.00 ROUTINE GENERAL MEDICAL EXAMINATION AT A HEALTH CARE FACILITY: Primary | ICD-10-CM

## 2018-05-30 DIAGNOSIS — C91.10 CHRONIC LYMPHATIC LEUKEMIA (HCC): ICD-10-CM

## 2018-05-30 DIAGNOSIS — J30.1 CHRONIC SEASONAL ALLERGIC RHINITIS DUE TO POLLEN: ICD-10-CM

## 2018-05-30 DIAGNOSIS — H69.82 EUSTACHIAN TUBE DYSFUNCTION, LEFT: ICD-10-CM

## 2018-05-30 DIAGNOSIS — J30.89 CHRONIC NONSEASONAL ALLERGIC RHINITIS DUE TO POLLEN: ICD-10-CM

## 2018-05-30 DIAGNOSIS — I10 ESSENTIAL HYPERTENSION: ICD-10-CM

## 2018-05-30 PROCEDURE — G0438 PPPS, INITIAL VISIT: HCPCS | Performed by: FAMILY MEDICINE

## 2018-05-30 PROCEDURE — G8427 DOCREV CUR MEDS BY ELIG CLIN: HCPCS | Performed by: FAMILY MEDICINE

## 2018-05-30 PROCEDURE — 4040F PNEUMOC VAC/ADMIN/RCVD: CPT | Performed by: FAMILY MEDICINE

## 2018-05-30 PROCEDURE — 1123F ACP DISCUSS/DSCN MKR DOCD: CPT | Performed by: FAMILY MEDICINE

## 2018-05-30 PROCEDURE — 1036F TOBACCO NON-USER: CPT | Performed by: FAMILY MEDICINE

## 2018-05-30 PROCEDURE — G8417 CALC BMI ABV UP PARAM F/U: HCPCS | Performed by: FAMILY MEDICINE

## 2018-05-30 PROCEDURE — 1090F PRES/ABSN URINE INCON ASSESS: CPT | Performed by: FAMILY MEDICINE

## 2018-05-30 PROCEDURE — 99213 OFFICE O/P EST LOW 20 MIN: CPT | Performed by: FAMILY MEDICINE

## 2018-05-30 PROCEDURE — G8400 PT W/DXA NO RESULTS DOC: HCPCS | Performed by: FAMILY MEDICINE

## 2018-05-30 RX ORDER — MONTELUKAST SODIUM 10 MG/1
10 TABLET ORAL NIGHTLY
Qty: 30 TABLET | Refills: 11 | Status: SHIPPED | OUTPATIENT
Start: 2018-05-30 | End: 2020-05-06 | Stop reason: ALTCHOICE

## 2018-05-30 ASSESSMENT — LIFESTYLE VARIABLES: HOW OFTEN DO YOU HAVE A DRINK CONTAINING ALCOHOL: 0

## 2018-05-30 ASSESSMENT — PATIENT HEALTH QUESTIONNAIRE - PHQ9: SUM OF ALL RESPONSES TO PHQ QUESTIONS 1-9: 0

## 2018-05-30 ASSESSMENT — ANXIETY QUESTIONNAIRES: GAD7 TOTAL SCORE: 0

## 2018-06-14 ENCOUNTER — OFFICE VISIT (OUTPATIENT)
Dept: FAMILY MEDICINE CLINIC | Age: 83
End: 2018-06-14
Payer: MEDICARE

## 2018-06-14 VITALS
HEART RATE: 75 BPM | DIASTOLIC BLOOD PRESSURE: 82 MMHG | BODY MASS INDEX: 27.7 KG/M2 | SYSTOLIC BLOOD PRESSURE: 126 MMHG | OXYGEN SATURATION: 97 % | HEIGHT: 65 IN | TEMPERATURE: 98.8 F | WEIGHT: 166.25 LBS | RESPIRATION RATE: 16 BRPM

## 2018-06-14 DIAGNOSIS — J30.89 ALLERGIC RHINITIS DUE TO OTHER ALLERGIC TRIGGER, UNSPECIFIED CHRONICITY, UNSPECIFIED SEASONALITY: Primary | ICD-10-CM

## 2018-06-14 PROCEDURE — 99213 OFFICE O/P EST LOW 20 MIN: CPT | Performed by: NURSE PRACTITIONER

## 2018-06-14 PROCEDURE — G8400 PT W/DXA NO RESULTS DOC: HCPCS | Performed by: NURSE PRACTITIONER

## 2018-06-14 PROCEDURE — 4040F PNEUMOC VAC/ADMIN/RCVD: CPT | Performed by: NURSE PRACTITIONER

## 2018-06-14 PROCEDURE — 1036F TOBACCO NON-USER: CPT | Performed by: NURSE PRACTITIONER

## 2018-06-14 PROCEDURE — 1123F ACP DISCUSS/DSCN MKR DOCD: CPT | Performed by: NURSE PRACTITIONER

## 2018-06-14 PROCEDURE — 1090F PRES/ABSN URINE INCON ASSESS: CPT | Performed by: NURSE PRACTITIONER

## 2018-06-14 PROCEDURE — G8427 DOCREV CUR MEDS BY ELIG CLIN: HCPCS | Performed by: NURSE PRACTITIONER

## 2018-06-14 PROCEDURE — G8417 CALC BMI ABV UP PARAM F/U: HCPCS | Performed by: NURSE PRACTITIONER

## 2018-06-14 RX ORDER — IPRATROPIUM BROMIDE 42 UG/1
2 SPRAY, METERED NASAL 4 TIMES DAILY
Qty: 1 BOTTLE | Refills: 3 | Status: SHIPPED | OUTPATIENT
Start: 2018-06-14 | End: 2020-05-06 | Stop reason: ALTCHOICE

## 2018-06-14 ASSESSMENT — ENCOUNTER SYMPTOMS
VISUAL CHANGE: 0
SCALP TENDERNESS: 0
ABDOMINAL PAIN: 0
COUGH: 0
SINUS PRESSURE: 0
NAUSEA: 1
EYE REDNESS: 0
EYE PAIN: 0
BLURRED VISION: 0
SWOLLEN GLANDS: 0
EYE WATERING: 0
SORE THROAT: 0
VOMITING: 0
RHINORRHEA: 0

## 2018-07-18 RX ORDER — HYDROCHLOROTHIAZIDE 25 MG/1
TABLET ORAL
Qty: 90 TABLET | Refills: 3 | Status: SHIPPED | OUTPATIENT
Start: 2018-07-18 | End: 2019-09-04 | Stop reason: SDUPTHER

## 2018-07-18 RX ORDER — CARVEDILOL 6.25 MG/1
TABLET ORAL
Qty: 180 TABLET | Refills: 3 | Status: SHIPPED | OUTPATIENT
Start: 2018-07-18 | End: 2019-09-04 | Stop reason: SDUPTHER

## 2018-07-18 NOTE — TELEPHONE ENCOUNTER
Scar Singer is calling to request a refill on the following medication(s):  Requested Prescriptions     Pending Prescriptions Disp Refills    hydrochlorothiazide (HYDRODIURIL) 25 MG tablet [Pharmacy Med Name: HYDROCHLOROTHIAZIDE 25 MG Tablet] 90 tablet 3     Sig: TAKE 1 TABLET EVERY DAY    carvedilol (COREG) 6.25 MG tablet [Pharmacy Med Name: CARVEDILOL 6.25 MG Tablet] 180 tablet 3     Sig: TAKE 1 TABLET TWICE DAILY       Last Visit Date (If Applicable):  9/78/1204    Next Visit Date:    11/29/2018

## 2019-04-10 ENCOUNTER — OFFICE VISIT (OUTPATIENT)
Dept: FAMILY MEDICINE CLINIC | Age: 84
End: 2019-04-10
Payer: MEDICARE

## 2019-04-10 VITALS
OXYGEN SATURATION: 96 % | WEIGHT: 180 LBS | BODY MASS INDEX: 30.42 KG/M2 | HEART RATE: 79 BPM | TEMPERATURE: 97.6 F | SYSTOLIC BLOOD PRESSURE: 140 MMHG | DIASTOLIC BLOOD PRESSURE: 86 MMHG

## 2019-04-10 DIAGNOSIS — H69.82 EUSTACHIAN TUBE DYSFUNCTION, LEFT: ICD-10-CM

## 2019-04-10 DIAGNOSIS — I10 ESSENTIAL HYPERTENSION: ICD-10-CM

## 2019-04-10 DIAGNOSIS — H40.9 GLAUCOMA OF BOTH EYES, UNSPECIFIED GLAUCOMA TYPE: ICD-10-CM

## 2019-04-10 DIAGNOSIS — R51.9 ACUTE NONINTRACTABLE HEADACHE, UNSPECIFIED HEADACHE TYPE: ICD-10-CM

## 2019-04-10 PROCEDURE — G8427 DOCREV CUR MEDS BY ELIG CLIN: HCPCS | Performed by: FAMILY MEDICINE

## 2019-04-10 PROCEDURE — 1090F PRES/ABSN URINE INCON ASSESS: CPT | Performed by: FAMILY MEDICINE

## 2019-04-10 PROCEDURE — G8400 PT W/DXA NO RESULTS DOC: HCPCS | Performed by: FAMILY MEDICINE

## 2019-04-10 PROCEDURE — G8417 CALC BMI ABV UP PARAM F/U: HCPCS | Performed by: FAMILY MEDICINE

## 2019-04-10 PROCEDURE — 4040F PNEUMOC VAC/ADMIN/RCVD: CPT | Performed by: FAMILY MEDICINE

## 2019-04-10 PROCEDURE — 1036F TOBACCO NON-USER: CPT | Performed by: FAMILY MEDICINE

## 2019-04-10 PROCEDURE — 1123F ACP DISCUSS/DSCN MKR DOCD: CPT | Performed by: FAMILY MEDICINE

## 2019-04-10 PROCEDURE — 99214 OFFICE O/P EST MOD 30 MIN: CPT | Performed by: FAMILY MEDICINE

## 2019-04-10 RX ORDER — TIMOLOL MALEATE 2.5 MG/ML
1 SOLUTION OPHTHALMIC DAILY
Qty: 1 BOTTLE | Refills: 4
Start: 2019-04-10 | End: 2020-05-06 | Stop reason: CLARIF

## 2019-04-10 RX ORDER — FLUTICASONE PROPIONATE 50 MCG
SPRAY, SUSPENSION (ML) NASAL
Qty: 1 BOTTLE | Refills: 5 | Status: SHIPPED | OUTPATIENT
Start: 2019-04-10 | End: 2020-07-16 | Stop reason: SDUPTHER

## 2019-04-10 ASSESSMENT — ENCOUNTER SYMPTOMS: RHINORRHEA: 1

## 2019-04-10 NOTE — PROGRESS NOTES
[facility]  [address]  Dept: [phone]  Dept [facility]:[phone]    Jeanne Tate is a 80 y.o. female who presents today for her medical conditions/complaints as notedbelow. Jeanne Tate is c/o of Headache (back of head) and Otalgia (bilateral, has had it in the ears off and on for a long time but, she has had a root canal that ended up having the tooth pulled and now has had these symptoms since)      HPI:     Has a headache on the back of her headache when she lays down since Mid march-- tender to touch, motly when she lays on it. Will be gone for a few hours and then come back. Seems the head hurts more when her ears bother her more. Takes tylenol but not sure it helps. Otalgia    There is pain in both ears. This is a recurrent problem. The current episode started more than 1 year ago (Has really been bad in the last few weeks). There has been no fever. The pain is mild. Associated symptoms include rhinorrhea (clears her throat alot). Pertinent negatives include no ear discharge (\"feels like they are sloshing off an on\" when she turns her head side to side). Associated symptoms comments: \"ears itch and feels like she wants to dig something out\". Treatments tried: does do her nose sprays. Has been using Has only used 9-10 times in the last month the flonase. Has started using the astelin 4 times. The treatment provided no relief. Her past medical history is significant for hearing loss (mild chronic). Has had some trouble with the pressure in the left eye and is seeing the eye Dr susi. Seeing the specialist in [place].     BP Readings from Last 3 Encounters:   04/10/19 (!) 140/86   06/14/18 126/82   05/30/18 120/74          (goal 120/80)    Wt Readings from Last 3 Encounters:   04/10/19 180 lb (81.6 kg)   06/14/18 166 lb 4 oz (75.4 kg)   05/30/18 164 lb (74.4 kg)        Past Medical History:   Diagnosis Date    Cancer ([address] Skin cancer    Chronic lymphatic leukemia (HCC)     Glaucoma     Glaucoma     Hypercholesteremia     Hyperlipidemia     Hypertension     Lumbar spinal stenosis     Meningioma (HCC)     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    Pulmonary embolism (HCC)     PVD (peripheral vascular disease) (HCC)     Thyroid nodule     Thyroid nodule     Valvular heart disease       Past Surgical History:   Procedure Laterality Date    BRAIN SURGERY  2010    meningioma    BRAIN SURGERY Left     to remove meningioma    CATARACT REMOVAL Bilateral 2012    COLONOSCOPY  04/2006    with polypectomy    CYST REMOVAL  1996    pancreatic    HYSTERECTOMY      JOINT REPLACEMENT      KNEE ARTHROSCOPY  1999    OTHER SURGICAL HISTORY  2011    injections for bulging discs    STERNUM FRACTURE SURGERY  1996       Family History   Problem Relation Age of Onset    Cancer Mother         skin    Heart Disease Father     High Blood Pressure Father     Stroke Father     Coronary Art Dis Father     Heart Attack Father     Cancer Sister         melanoma    Cancer Brother         skin       Social History     Tobacco Use    Smoking status: Never Smoker    Smokeless tobacco: Never Used   Substance Use Topics    Alcohol use: No      Current Outpatient Medications   Medication Sig Dispense Refill    timolol (TIMOPTIC-XE) 0.25 % ophthalmic gel-forming Place 1 drop into the left eye daily Per opthamologist 1 Bottle 4    fluticasone (FLONASE) 50 MCG/ACT nasal spray instill 2 sprays into each nostril once daily 1 Bottle 5    hydrochlorothiazide (HYDRODIURIL) 25 MG tablet TAKE 1 TABLET EVERY DAY 90 tablet 3    carvedilol (COREG) 6.25 MG tablet TAKE 1 TABLET TWICE DAILY 180 tablet 3    ipratropium (ATROVENT) 0.06 % nasal spray 2 sprays by Nasal route 4 times daily 1 Bottle 3    montelukast (SINGULAIR) 10 MG tablet Take 1 tablet by mouth nightly 30 tablet 11    meloxicam (MOBIC) 7.5 MG tablet Take 1 tablet by mouth daily 30 tablet 1    loratadine (CLARITIN) 10 MG tablet Take 1 tablet by mouth daily 30 tablet 3    Misc Natural Products (OSTEO BI-FLEX JOINT SHIELD PO) Take by mouth      Psyllium (METAMUCIL PO) Take 5 capsules by mouth 2 times daily      Cholecalciferol (VITAMIN D) 2000 units CAPS capsule Take by mouth daily      Probiotic Product (PROBIOTIC DAILY PO) Take by mouth      albuterol sulfate HFA (VENTOLIN HFA) 108 (90 Base) MCG/ACT inhaler Inhale 2 puffs into the lungs every 6 hours as needed for Wheezing 1 Inhaler 3    LUMIGAN 0.01 % SOLN ophthalmic drops       Cranberry 500 MG CAPS Take 500 mg by mouth 2 times daily 60 capsule 3    Omega-3 Fatty Acids (FISH OIL) 1000 MG CAPS Take 1 capsule by mouth daily 90 capsule 3    Multiple Vitamins-Minerals (MULTIVITAMIN WITH MINERALS) tablet Take 1 tablet by mouth daily 30 tablet 3    Glucosamine-Chondroitin 250-200 MG TABS Take 2 capsules by mouth daily 60 tablet 0    Calcium Polycarbophil (FIBER) 625 MG TABS Take 1 tablet by mouth daily 14 tablet 0    Lactobacillus (PROBIOTIC ACIDOPHILUS) TABS Take 1 tablet by mouth daily 60 tablet 0    aspirin EC 81 MG EC tablet Take 1 tablet by mouth daily 30 tablet 3     No current facility-administered medications for this visit. Allergies   Allergen Reactions    Aggrenox [Aspirin-Dipyridamole Er]      Sever HA    Atorvastatin      Other reaction(s): Muscle cramps    Gabapentin     Prednisone     Vicodin [Hydrocodone-Acetaminophen]        Health Maintenance   Topic Date Due    DTaP/Tdap/Td vaccine (1 - Tdap) 04/11/2005    Shingles Vaccine (3 of 3) 01/03/2019    Potassium monitoring  11/07/2019    Creatinine monitoring  11/07/2019    DEXA (modify frequency per FRAX score)  Completed    Flu vaccine  Completed    Pneumococcal 65+ years Vaccine  Completed       Subjective:      Review of Systems   HENT: Positive for ear pain and rhinorrhea (clears her throat alot).  Negative for ear discharge (\"feels like they are sloshing off an on\" when she turns her head side to side). Objective:     BP (!) 140/86   Pulse 79   Temp 97.6 °F (36.4 °C) (Tympanic)   Wt 180 lb (81.6 kg)   SpO2 96%   BMI 30.42 kg/m²     Physical Exam   Constitutional: She is oriented to person, place, and time. Vital signs are normal. She appears well-developed and well-nourished. No distress. HENT:   Head: Normocephalic. Right Ear: Tympanic membrane, external ear and ear canal normal.   Left Ear: Tympanic membrane, external ear and ear canal normal.   Nose: Rhinorrhea present. No mucosal edema. Right sinus exhibits no maxillary sinus tenderness and no frontal sinus tenderness. Left sinus exhibits no maxillary sinus tenderness and no frontal sinus tenderness. Mouth/Throat: Uvula is midline, oropharynx is clear and moist and mucous membranes are normal.   Eyes: Conjunctivae and EOM are normal. Right eye exhibits no discharge. Left eye exhibits no discharge. No scleral icterus. Neck: Normal range of motion. Neck supple. Cardiovascular: Normal rate, regular rhythm and normal heart sounds. Pulmonary/Chest: Effort normal and breath sounds normal. No respiratory distress. Musculoskeletal: Normal range of motion. Neurological: She is alert and oriented to person, place, and time. Skin: Skin is warm, dry and intact. She is not diaphoretic. Psychiatric: She has a normal mood and affect. Her speech is normal and behavior is normal. Judgment and thought content normal. Cognition and memory are normal.   Nursing note and vitals reviewed. Assessment/Plan:      Diagnosis Orders   1. Eustachian tube dysfunction, left     2. Glaucoma of both eyes, unspecified glaucoma type  timolol (TIMOPTIC-XE) 0.25 % ophthalmic gel-forming   3. Essential hypertension     4. Acute nonintractable headache, unspecified headache type  Suspect the headache is benign, no worrisome features, may be related to neuro sx. Will monitor.  If increases or persists would consider imaging in light of history of meningioma and to rule out spinal arthritis       Reassured that the ears were nl today. No other issues noted. Recommend daily use of the nasal spray and her loratadine. Lab Results   Component Value Date    WBC 23.85 (HH) 11/07/2018    HGB 13.9 11/07/2018    HCT 41.5 11/07/2018     11/07/2018    CHOL 237 02/26/2016    TRIG 355 02/26/2016    HDL 35 02/26/2016    ALT 61 (H) 11/07/2018    AST 42 (H) 11/07/2018     11/07/2018    K 4.5 11/07/2018     11/07/2018    CREATININE 0.9 11/07/2018    BUN 27 (H) 11/07/2018    CO2 30 11/07/2018       Return in about 1 month (around 5/8/2019), or if symptoms worsen or fail to improve, for Medication recheck. Patient given educational materials - see patientinstructions. Discussed use, benefit, and side effects of prescribed medications. All patient questions answered. Pt voiced understanding. Reviewed health maintenance. Instructed to continue current medications, diet andexercise. Patient agreed with treatment plan. Follow up as directed.      Electronically signed by Barbara Mcfarlane MD on 4/14/2019

## 2019-05-04 ENCOUNTER — OFFICE VISIT (OUTPATIENT)
Dept: FAMILY MEDICINE CLINIC | Age: 84
End: 2019-05-04
Payer: MEDICARE

## 2019-05-04 VITALS
WEIGHT: 171 LBS | HEIGHT: 65 IN | TEMPERATURE: 99.9 F | SYSTOLIC BLOOD PRESSURE: 120 MMHG | BODY MASS INDEX: 28.49 KG/M2 | DIASTOLIC BLOOD PRESSURE: 62 MMHG

## 2019-05-04 DIAGNOSIS — R53.83 FATIGUE, UNSPECIFIED TYPE: ICD-10-CM

## 2019-05-04 DIAGNOSIS — C91.10 CHRONIC LYMPHATIC LEUKEMIA (HCC): ICD-10-CM

## 2019-05-04 DIAGNOSIS — R11.0 NAUSEA: ICD-10-CM

## 2019-05-04 DIAGNOSIS — R39.9 UTI SYMPTOMS: Primary | ICD-10-CM

## 2019-05-04 DIAGNOSIS — N30.00 ACUTE CYSTITIS WITHOUT HEMATURIA: ICD-10-CM

## 2019-05-04 LAB
A/G RATIO: 1.4 RATIO
AGE FOR GFR: 84
ALBUMIN: 4.3 G/DL (ref 3.5–5)
ALK PHOSPHATASE: 95 UNITS/L (ref 38–126)
ALT SERPL-CCNC: 26 UNITS/L (ref 9–52)
ANION GAP SERPL CALCULATED.3IONS-SCNC: 14 MMOL/L
AST SERPL-CCNC: 20 UNITS/L (ref 14–36)
BILIRUB SERPL-MCNC: 0.7 MG/DL (ref 0.2–1.3)
BILIRUBIN, POC: NORMAL
BLOOD URINE, POC: NORMAL
BUN BLDV-MCNC: 23 MG/DL (ref 7–17)
CALCIUM SERPL-MCNC: 9.9 MG/DL (ref 8.4–10.2)
CHLORIDE BLD-SCNC: 102 MMOL/L (ref 98–120)
CLARITY, POC: CLEAR
CO2: 28 MMOL/L (ref 22–31)
COLOR, POC: YELLOW
CREAT SERPL-MCNC: 0.9 MG/DL (ref 0.5–1)
EGFR BF: 72 ML/MIN/1.73 M2
EGFR BM: 97 ML/MIN/1.73 M2
EGFR WF: 60 ML/MIN/1.73 M2
EGFR WM: 80 ML/MIN/1.73 M2
GLOBULIN: 3.1 G/DL
GLUCOSE URINE, POC: NORMAL
GLUCOSE: 103 MG/DL (ref 65–105)
KETONES, POC: NORMAL
LEUKOCYTE EST, POC: NORMAL
NITRITE, POC: NORMAL
PH, POC: 6
POTASSIUM SERPL-SCNC: 4 MMOL/L (ref 3.6–5)
PROTEIN, POC: NORMAL
SODIUM BLD-SCNC: 140 MMOL/L (ref 135–145)
SPECIFIC GRAVITY, POC: 1
TOTAL PROTEIN: 7.4 G/DL (ref 6.3–8.2)
URINE CULTURE, ROUTINE: NORMAL
UROBILINOGEN, POC: NORMAL

## 2019-05-04 PROCEDURE — 1036F TOBACCO NON-USER: CPT | Performed by: FAMILY MEDICINE

## 2019-05-04 PROCEDURE — 1123F ACP DISCUSS/DSCN MKR DOCD: CPT | Performed by: FAMILY MEDICINE

## 2019-05-04 PROCEDURE — G8427 DOCREV CUR MEDS BY ELIG CLIN: HCPCS | Performed by: FAMILY MEDICINE

## 2019-05-04 PROCEDURE — 1090F PRES/ABSN URINE INCON ASSESS: CPT | Performed by: FAMILY MEDICINE

## 2019-05-04 PROCEDURE — 4040F PNEUMOC VAC/ADMIN/RCVD: CPT | Performed by: FAMILY MEDICINE

## 2019-05-04 PROCEDURE — 99214 OFFICE O/P EST MOD 30 MIN: CPT | Performed by: FAMILY MEDICINE

## 2019-05-04 PROCEDURE — G8417 CALC BMI ABV UP PARAM F/U: HCPCS | Performed by: FAMILY MEDICINE

## 2019-05-04 PROCEDURE — 81002 URINALYSIS NONAUTO W/O SCOPE: CPT | Performed by: FAMILY MEDICINE

## 2019-05-04 PROCEDURE — G8400 PT W/DXA NO RESULTS DOC: HCPCS | Performed by: FAMILY MEDICINE

## 2019-05-04 RX ORDER — LEVOFLOXACIN 500 MG/1
500 TABLET, FILM COATED ORAL DAILY
Qty: 10 TABLET | Refills: 0 | Status: SHIPPED | OUTPATIENT
Start: 2019-05-04 | End: 2019-05-14

## 2019-05-04 ASSESSMENT — ENCOUNTER SYMPTOMS
COUGH: 0
ABDOMINAL PAIN: 0
DIARRHEA: 0
NAUSEA: 0
VOMITING: 0
WHEEZING: 0
SORE THROAT: 0

## 2019-05-04 ASSESSMENT — PATIENT HEALTH QUESTIONNAIRE - PHQ9
1. LITTLE INTEREST OR PLEASURE IN DOING THINGS: 0
2. FEELING DOWN, DEPRESSED OR HOPELESS: 0
SUM OF ALL RESPONSES TO PHQ QUESTIONS 1-9: 0
SUM OF ALL RESPONSES TO PHQ QUESTIONS 1-9: 0
SUM OF ALL RESPONSES TO PHQ9 QUESTIONS 1 & 2: 0

## 2019-05-04 NOTE — PROGRESS NOTES
Fatigue   Associated symptoms include fatigue and a fever. Pertinent negatives include no abdominal pain, chest pain, congestion, coughing, headaches, nausea, rash, sore throat or vomiting.        BP Readings from Last 3 Encounters:   05/04/19 120/62   04/10/19 (!) 140/86   06/14/18 126/82          (goal 120/80)    Wt Readings from Last 3 Encounters:   05/04/19 171 lb (77.6 kg)   04/10/19 180 lb (81.6 kg)   06/14/18 166 lb 4 oz (75.4 kg)        Past Medical History:   Diagnosis Date    Cancer ([address]     Skin cancer    [facility]     Glaucoma     Glaucoma     Hypercholesteremia     Hyperlipidemia     Hypertension     Lumbar spinal stenosis     Meningioma (HCC)     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    Pulmonary embolism ([address]     PVD (peripheral vascular disease) ([address]     Thyroid nodule     Thyroid nodule     Valvular heart disease       Past Surgical History:   Procedure Laterality Date    BRAIN SURGERY  2010    meningioma    BRAIN SURGERY Left     to remove meningioma    CATARACT REMOVAL Bilateral 2012    COLONOSCOPY  04/2006    with polypectomy    CYST REMOVAL  1996    pancreatic    HYSTERECTOMY      JOINT REPLACEMENT      KNEE ARTHROSCOPY  1999    OTHER SURGICAL HISTORY  2011    injections for bulging discs    STERNUM FRACTURE SURGERY  1996       Family History   Problem Relation Age of Onset    Cancer Mother         skin    Heart Disease Father     High Blood Pressure Father     Stroke Father     Coronary Art Dis Father     Heart Attack Father     Cancer Sister         melanoma    Cancer Brother         skin       Social History     Tobacco Use    Smoking status: Never Smoker    Smokeless tobacco: Never Used   Substance Use Topics    Alcohol use: No      Current Outpatient Medications   Medication Sig Dispense Refill    levofloxacin (LEVAQUIN) 500 MG tablet Take 1 tablet by mouth daily for 10 days 10 tablet 0    timolol (TIMOPTIC-XE) 0.25 % ophthalmic gel-forming Place 1 drop into the left eye daily Per opthamologist 1 Bottle 4    fluticasone (FLONASE) 50 MCG/ACT nasal spray instill 2 sprays into each nostril once daily 1 Bottle 5    hydrochlorothiazide (HYDRODIURIL) 25 MG tablet TAKE 1 TABLET EVERY DAY 90 tablet 3    carvedilol (COREG) 6.25 MG tablet TAKE 1 TABLET TWICE DAILY 180 tablet 3    ipratropium (ATROVENT) 0.06 % nasal spray 2 sprays by Nasal route 4 times daily 1 Bottle 3    montelukast (SINGULAIR) 10 MG tablet Take 1 tablet by mouth nightly 30 tablet 11    loratadine (CLARITIN) 10 MG tablet Take 1 tablet by mouth daily 30 tablet 3    Misc Natural Products (OSTEO BI-FLEX JOINT SHIELD PO) Take by mouth      Psyllium (METAMUCIL PO) Take 5 capsules by mouth 2 times daily      Cholecalciferol (VITAMIN D) 2000 units CAPS capsule Take by mouth daily      Probiotic Product (PROBIOTIC DAILY PO) Take by mouth      albuterol sulfate HFA (VENTOLIN HFA) 108 (90 Base) MCG/ACT inhaler Inhale 2 puffs into the lungs every 6 hours as needed for Wheezing 1 Inhaler 3    LUMIGAN 0.01 % SOLN ophthalmic drops       Cranberry 500 MG CAPS Take 500 mg by mouth 2 times daily 60 capsule 3    Omega-3 Fatty Acids (FISH OIL) 1000 MG CAPS Take 1 capsule by mouth daily 90 capsule 3    Multiple Vitamins-Minerals (MULTIVITAMIN WITH MINERALS) tablet Take 1 tablet by mouth daily 30 tablet 3    Glucosamine-Chondroitin 250-200 MG TABS Take 2 capsules by mouth daily 60 tablet 0    Calcium Polycarbophil (FIBER) 625 MG TABS Take 1 tablet by mouth daily 14 tablet 0    Lactobacillus (PROBIOTIC ACIDOPHILUS) TABS Take 1 tablet by mouth daily 60 tablet 0    aspirin EC 81 MG EC tablet Take 1 tablet by mouth daily 30 tablet 3    meloxicam (MOBIC) 7.5 MG tablet Take 1 tablet by mouth daily 30 tablet 1     No current facility-administered medications for this visit.       Allergies   Allergen Reactions    Aggrenox [Aspirin-Dipyridamole Er] Sever HERNANDEZ    Atorvastatin      Other reaction(s): Muscle cramps    Gabapentin     Prednisone     Vicodin [Hydrocodone-Acetaminophen]        Health Maintenance   Topic Date Due    DTaP/Tdap/Td vaccine (1 - Tdap) 04/11/2005    Shingles Vaccine (3 of 3) 01/03/2019    Potassium monitoring  11/07/2019    Creatinine monitoring  11/07/2019    DEXA (modify frequency per FRAX score)  Completed    Flu vaccine  Completed    Pneumococcal 65+ years Vaccine  Completed       Subjective:      Review of Systems   Constitutional: Positive for fatigue and fever. HENT: Negative for congestion, ear pain and sore throat. Respiratory: Negative for cough and wheezing. Cardiovascular: Negative for chest pain. Gastrointestinal: Negative for abdominal pain, diarrhea, nausea and vomiting. Genitourinary: Negative for dysuria. Skin: Negative for rash. Neurological: Negative for headaches. Objective:     /62   Temp 99.9 °F (37.7 °C)   Ht 5' 4.5\" (1.638 m)   Wt 171 lb (77.6 kg)   BMI 28.90 kg/m²     Physical Exam   Constitutional: She is oriented to person, place, and time. She appears well-developed and well-nourished. Appears fatigued otherwise is unremakrable   HENT:   Head: Normocephalic and atraumatic. Right Ear: Tympanic membrane, external ear and ear canal normal. Decreased hearing is noted. Left Ear: Tympanic membrane, external ear and ear canal normal. Decreased hearing is noted. Minimally decreased hearing bilaterally   Eyes: Conjunctivae and EOM are normal.   Neck: Normal range of motion. Neck supple. Cardiovascular: Normal rate, regular rhythm, S1 normal and intact distal pulses. Exam reveals no gallop and no friction rub. Murmur (2/6) heard. Systolic murmur is present with a grade of 2/6. Prominent S2 at the RSB and soft systolic murmur   Pulmonary/Chest: Effort normal and breath sounds normal. No respiratory distress. Abdominal: Soft. She exhibits no distension and no mass. There is no tenderness. There is no rebound and no guarding. No hernia. No CVA tenderness noted   Musculoskeletal: She exhibits no edema. Lymphadenopathy:     She has no cervical adenopathy. Neurological: She is alert and oriented to person, place, and time. She displays normal reflexes. No cranial nerve deficit. Coordination normal.   Gait normal   Skin: Skin is warm. Psychiatric: She has a normal mood and affect. Her behavior is normal. Judgment and thought content normal.   Nursing note and vitals reviewed. Assessment/Plan:      Diagnosis Orders   1. UTI symptoms  POCT Urinalysis no Micro    levofloxacin (LEVAQUIN) 500 MG tablet    Urine Culture   2. Acute cystitis without hematuria  levofloxacin (LEVAQUIN) 500 MG tablet- will use the broader spectrum in light of the age and the significant sx, will cover if other pathology occult is present    Comprehensive Metabolic Panel    CBC Auto Differential    Urine Culture   3. Fatigue, unspecified type  Comprehensive Metabolic Panel    CBC Auto Differential   4. Nausea  Comprehensive Metabolic Panel    CBC Auto Differential- encouraged small amounts frequently. Has not tolerated a lot of meds well in the past so will not add an antiemetic at this time. 5. Chronic lymphatic leukemia (HCC)  CBC Auto Differential-suspect labs will be higher with the acute illness but would like to ensure no crisis as cause of her sx as well. Lab Results   Component Value Date    WBC 34.58 (HH) 05/04/2019    HGB 12.8 05/04/2019    HCT 39.0 05/04/2019     05/04/2019    CHOL 237 02/26/2016    TRIG 355 02/26/2016    HDL 35 02/26/2016    ALT 26 05/04/2019    AST 20 05/04/2019     05/04/2019    K 4.0 05/04/2019     05/04/2019    CREATININE 0.9 05/04/2019    BUN 23 (H) 05/04/2019    CO2 28 05/04/2019       Return Thursday reeval as scheulded in PCP clinic. Patient given educational materials - see patientinstructions.   Discussed use, benefit, and side effects of prescribed medications. All patient questions answered. Pt voiced understanding. Reviewed health maintenance. Instructed to continue current medications, diet andexercise. Patient agreed with treatment plan. Follow up as directed.      Electronically signed by Adelita Mosqueda MD on 5/4/2019

## 2019-05-06 ENCOUNTER — TELEPHONE (OUTPATIENT)
Dept: FAMILY MEDICINE CLINIC | Age: 84
End: 2019-05-06

## 2019-05-06 LAB
BANDS: 0 % (ref 0–5)
BASOPHILS # BLD: 0 % (ref 0–1)
DIFFERENTIAL: MANUAL DIFF
EOSINOPHIL # BLD: 1 % (ref 0–10)
HCT VFR BLD CALC: 39 % (ref 37–47)
HEMOGLOBIN: 12.8 G/DL (ref 12–16)
LYMPHOCYTES # BLD: 68 % (ref 21–51)
MCH RBC QN AUTO: 30 PG (ref 28.5–32)
MCHC RBC AUTO-ENTMCNC: 32.9 G/DL (ref 32–37)
MCV RBC AUTO: 91.4 FL (ref 80–94)
MONOCYTES: 3 % (ref 2–9)
MORPHOLOGY: NORMAL
NEUTROPHILS: 28 % (ref 42–75)
PATHOLOGIST REVIEW: ABNORMAL
PDW BLD-RTO: 11.1 % (ref 8.5–15.5)
PLATELET # BLD: 277 THOU/MM3 (ref 130–400)
PMV BLD AUTO: 8.2 FL (ref 7.4–11)
RBC # BLD: 4.27 M/UL (ref 4.2–5.4)
WBC # BLD: 34.58 THOU/ML3 (ref 4.8–10)

## 2019-05-09 ENCOUNTER — TELEPHONE (OUTPATIENT)
Dept: FAMILY MEDICINE CLINIC | Age: 84
End: 2019-05-09

## 2019-05-09 NOTE — TELEPHONE ENCOUNTER
Saida Gracia called in regards to Matilde's appt tomorrow 5/10 at 9:30. Does she need to be seen tomorrow or should she wait it out a little longer? States mom told me that she isnt as achy as she was but she is sleeping a lot. She was given a 10 day dose of ATB. With everyone's work schedules we are having difficulty finding her a ride.

## 2019-05-13 ENCOUNTER — OFFICE VISIT (OUTPATIENT)
Dept: FAMILY MEDICINE CLINIC | Age: 84
End: 2019-05-13
Payer: MEDICARE

## 2019-05-13 VITALS
DIASTOLIC BLOOD PRESSURE: 76 MMHG | OXYGEN SATURATION: 97 % | WEIGHT: 173 LBS | SYSTOLIC BLOOD PRESSURE: 132 MMHG | TEMPERATURE: 98.5 F | HEART RATE: 83 BPM | BODY MASS INDEX: 29.24 KG/M2

## 2019-05-13 DIAGNOSIS — R50.9 FEVER, UNSPECIFIED FEVER CAUSE: ICD-10-CM

## 2019-05-13 DIAGNOSIS — C91.10 CHRONIC LYMPHATIC LEUKEMIA (HCC): Primary | ICD-10-CM

## 2019-05-13 DIAGNOSIS — R53.1 WEAKNESS: ICD-10-CM

## 2019-05-13 LAB
C-REACTIVE PROTEIN: 8.3 MG/DL (ref 0–1)
DIFFERENTIAL: MANUAL DIFF
HCT VFR BLD CALC: 37.2 % (ref 37–47)
HEMOGLOBIN: 12 G/DL (ref 12–16)
LYMPHOCYTES # BLD: 78 % (ref 21–51)
MCH RBC QN AUTO: 29.2 PG (ref 28.5–32)
MCHC RBC AUTO-ENTMCNC: 32.4 G/DL (ref 32–37)
MCV RBC AUTO: 90.1 FL (ref 80–94)
MONOCYTES: 3 % (ref 2–9)
NEUTROPHILS: 19 % (ref 42–75)
PDW BLD-RTO: 11.4 % (ref 8.5–15.5)
PLATELET # BLD: 307 THOU/MM3 (ref 130–400)
PMV BLD AUTO: 6.9 FL (ref 7.4–11)
RBC # BLD: 4.13 M/UL (ref 4.2–5.4)
SEDIMENTATION RATE, ERYTHROCYTE: 98 MM/HR (ref 0–30)
WBC # BLD: 27.06 THOU/ML3 (ref 4.8–10)

## 2019-05-13 PROCEDURE — G8427 DOCREV CUR MEDS BY ELIG CLIN: HCPCS | Performed by: FAMILY MEDICINE

## 2019-05-13 PROCEDURE — 1123F ACP DISCUSS/DSCN MKR DOCD: CPT | Performed by: FAMILY MEDICINE

## 2019-05-13 PROCEDURE — 1090F PRES/ABSN URINE INCON ASSESS: CPT | Performed by: FAMILY MEDICINE

## 2019-05-13 PROCEDURE — 99214 OFFICE O/P EST MOD 30 MIN: CPT | Performed by: FAMILY MEDICINE

## 2019-05-13 PROCEDURE — 1036F TOBACCO NON-USER: CPT | Performed by: FAMILY MEDICINE

## 2019-05-13 PROCEDURE — 4040F PNEUMOC VAC/ADMIN/RCVD: CPT | Performed by: FAMILY MEDICINE

## 2019-05-13 PROCEDURE — G8417 CALC BMI ABV UP PARAM F/U: HCPCS | Performed by: FAMILY MEDICINE

## 2019-05-13 PROCEDURE — G8400 PT W/DXA NO RESULTS DOC: HCPCS | Performed by: FAMILY MEDICINE

## 2019-05-13 NOTE — PROGRESS NOTES
[address] [place] [address] [address]  Dept: [phone]  Dept [facility]:[phone]    Shahram Alvarado is a 80 y.o. female who presents today for her medical conditions/complaints as notedbelow. Shahram Alvarado is c/o of Fever (i have had a fever for over 2 weeks. states the ATB she put me on just didnt work. was suppose to take last dose today and didnt. )      HPI:     HPI    Has just not been feeling well overall. Took her temperature this morning and it 101.3 orally and then later it was down to about 100. Has had this fever off and on for the last 2.5 weeks. When she gets the temp she is wiped out and has to go and lay down and then will feel alittle better. The achiness is a bit better over all but still in the upper arms over all. This is pretty stable all the time. Appetite is no better she doesn't think. Has had some nausea but has not vomited. No diarrhea. No cough at all. No ST>      No burning when she urinated, the muscle aches just hurt so much that hurts to even sit down sometimes. Had an appt with Dr. Bessie Carver in June. Is to have blood work done prior to that appt.     BP Readings from Last 3 Encounters:   05/13/19 132/76   05/04/19 120/62   04/10/19 (!) 140/86          (goal 120/80)    Wt Readings from Last 3 Encounters:   05/13/19 173 lb (78.5 kg)   05/04/19 171 lb (77.6 kg)   04/10/19 180 lb (81.6 kg)        Past Medical History:   Diagnosis Date    Cancer ([address]     Skin cancer    Chronic lymphatic leukemia ([address]     Glaucoma     Glaucoma     Hypercholesteremia     Hyperlipidemia     Hypertension     Lumbar spinal stenosis     Meningioma ([address]     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    Pulmonary embolism (HCC)     PVD (peripheral vascular disease) ([address]     Thyroid nodule     Thyroid nodule     Valvular heart disease       Past Surgical History:   Procedure Laterality Date    BRAIN SURGERY Acids (FISH OIL) 1000 MG CAPS Take 1 capsule by mouth daily 90 capsule 3    Multiple Vitamins-Minerals (MULTIVITAMIN WITH MINERALS) tablet Take 1 tablet by mouth daily 30 tablet 3    Glucosamine-Chondroitin 250-200 MG TABS Take 2 capsules by mouth daily 60 tablet 0    Calcium Polycarbophil (FIBER) 625 MG TABS Take 1 tablet by mouth daily 14 tablet 0    Lactobacillus (PROBIOTIC ACIDOPHILUS) TABS Take 1 tablet by mouth daily 60 tablet 0    aspirin EC 81 MG EC tablet Take 1 tablet by mouth daily 30 tablet 3    methylPREDNISolone (MEDROL DOSEPACK) 4 MG tablet Take by mouth. 1 kit 0    timolol (TIMOPTIC-XE) 0.25 % ophthalmic gel-forming Place 1 drop into the left eye daily Per opthamologist 1 Bottle 4     No current facility-administered medications for this visit. Allergies   Allergen Reactions    Aggrenox [Aspirin-Dipyridamole Er]      Sever HA    Atorvastatin      Other reaction(s): Muscle cramps    Gabapentin     Prednisone     Vicodin [Hydrocodone-Acetaminophen]        Health Maintenance   Topic Date Due    DTaP/Tdap/Td vaccine (1 - Tdap) 04/11/2005    Shingles Vaccine (3 of 3) 01/03/2019    Potassium monitoring  05/04/2020    Creatinine monitoring  05/04/2020    DEXA (modify frequency per FRAX score)  Completed    Flu vaccine  Completed    Pneumococcal 65+ years Vaccine  Completed       Subjective:      Review of Systems    Objective:     /76   Pulse 83   Temp 98.5 °F (36.9 °C)   Wt 173 lb (78.5 kg)   SpO2 97%   BMI 29.24 kg/m²     Physical Exam   Constitutional: She is oriented to person, place, and time. She appears well-developed and well-nourished. Appears fatigued otherwise is unremakrable, anxious   HENT:   Head: Normocephalic and atraumatic. Right Ear: Tympanic membrane, external ear and ear canal normal. Decreased hearing is noted. Left Ear: Tympanic membrane, external ear and ear canal normal. Decreased hearing is noted.    Minimally decreased hearing bilaterally Eyes: Conjunctivae and EOM are normal.   Neck: Normal range of motion. Neck supple. Cardiovascular: Normal rate, regular rhythm, S1 normal and intact distal pulses. Exam reveals no gallop and no friction rub. Murmur (2/6) heard. Systolic murmur is present with a grade of 2/6. Prominent S2 at the RSB and soft systolic murmur   Pulmonary/Chest: Effort normal and breath sounds normal. No respiratory distress. Abdominal: Soft. She exhibits no distension and no mass. There is no tenderness. There is no rebound and no guarding. No hernia. No CVA tenderness noted   Musculoskeletal: She exhibits no edema. Lymphadenopathy:     She has no cervical adenopathy. Neurological: She is alert and oriented to person, place, and time. She displays normal reflexes. No cranial nerve deficit. Coordination normal.   Skin: Skin is warm. Psychiatric: She has a normal mood and affect. Her behavior is normal. Judgment and thought content normal.   Nursing note and vitals reviewed. Assessment/Plan:      Diagnosis Orders   1. Chronic lymphatic leukemia ([facility]     2. Fever, unspecified fever cause  XR CHEST STANDARD (2 VW)   3. Weakness       Not clear where her sx are. She is still quite symptomatic and not sure if she is having new sx from the CLL or if there is another cause but no evidence of infection. [facility] estudies and then consult Dr. Clint Pride. As her HA has resolved do not think a repeat MRI is indicated at this time. Lab Results   Component Value Date    WBC 27.06 (HH) 05/13/2019    HGB 12.0 05/13/2019    HCT 37.2 05/13/2019     05/13/2019    CHOL 237 02/26/2016    TRIG 355 02/26/2016    HDL 35 02/26/2016    ALT 26 05/04/2019    AST 20 05/04/2019     05/04/2019    K 4.0 05/04/2019     05/04/2019    CREATININE 0.9 05/04/2019    BUN 23 (H) 05/04/2019    CO2 28 05/04/2019       Return in about 3 months (around 8/13/2019).             Patient given educational materials - see patientinstructions. Discussed use, benefit, and side effects of prescribed medications. All patient questions answered. Pt voiced understanding. Reviewed health maintenance. Instructed to continue current medications, diet andexercise. Patient agreed with treatment plan. Follow up as directed.      Electronically signed by Mariposa Santiago MD on 5/19/2019

## 2019-05-15 ENCOUNTER — TELEPHONE (OUTPATIENT)
Dept: FAMILY MEDICINE CLINIC | Age: 84
End: 2019-05-15

## 2019-05-15 DIAGNOSIS — C91.10 CHRONIC LYMPHATIC LEUKEMIA (HCC): Primary | ICD-10-CM

## 2019-05-15 DIAGNOSIS — R53.83 FATIGUE, UNSPECIFIED TYPE: ICD-10-CM

## 2019-05-15 RX ORDER — METHYLPREDNISOLONE 4 MG/1
TABLET ORAL
Qty: 1 KIT | Refills: 0 | Status: SHIPPED | OUTPATIENT
Start: 2019-05-15 | End: 2019-05-21

## 2019-05-16 LAB
IGA: NORMAL
IGG: NORMAL
IGM: NORMAL

## 2019-05-30 ENCOUNTER — TELEPHONE (OUTPATIENT)
Dept: FAMILY MEDICINE CLINIC | Age: 84
End: 2019-05-30

## 2019-05-30 NOTE — TELEPHONE ENCOUNTER
Notified Arnie Zavala. States mom does have another appt to see dr. Sadi Montoya on Monday, is there any point in her coming in then? I know she really isnt going to want to unless there is something else that is going to be done, like lab work. She feels about the same as she has, the symptoms seem to come and go, I do know that she did feel better when she was on the prednisone pack. Is there a possibility of doing that again?

## 2019-05-30 NOTE — TELEPHONE ENCOUNTER
April Mack called back, states I spoke with mom. She does want to keep her appt for Monday, but would still like to know about the steroids.

## 2019-05-30 NOTE — TELEPHONE ENCOUNTER
Daughter Marilia Cooper called for results of send out labs. Results appear to be normal and other additional information?

## 2019-06-03 ENCOUNTER — TELEPHONE (OUTPATIENT)
Dept: FAMILY MEDICINE CLINIC | Age: 84
End: 2019-06-03

## 2019-06-03 ENCOUNTER — OFFICE VISIT (OUTPATIENT)
Dept: FAMILY MEDICINE CLINIC | Age: 84
End: 2019-06-03
Payer: MEDICARE

## 2019-06-03 VITALS
DIASTOLIC BLOOD PRESSURE: 74 MMHG | SYSTOLIC BLOOD PRESSURE: 136 MMHG | HEART RATE: 80 BPM | WEIGHT: 172 LBS | BODY MASS INDEX: 29.07 KG/M2

## 2019-06-03 DIAGNOSIS — M79.601 BILATERAL ARM PAIN: ICD-10-CM

## 2019-06-03 DIAGNOSIS — Z00.00 ROUTINE GENERAL MEDICAL EXAMINATION AT A HEALTH CARE FACILITY: ICD-10-CM

## 2019-06-03 DIAGNOSIS — M79.602 BILATERAL ARM PAIN: ICD-10-CM

## 2019-06-03 DIAGNOSIS — Z78.0 ASYMPTOMATIC MENOPAUSAL STATE: ICD-10-CM

## 2019-06-03 DIAGNOSIS — D32.0 MENINGIOMA, CEREBRAL (HCC): Primary | ICD-10-CM

## 2019-06-03 DIAGNOSIS — M54.2 NECK PAIN ON LEFT SIDE: ICD-10-CM

## 2019-06-03 DIAGNOSIS — D32.0 MENINGIOMA, CEREBRAL (HCC): ICD-10-CM

## 2019-06-03 PROBLEM — I25.10 ATHEROSCLEROTIC HEART DISEASE OF NATIVE CORONARY ARTERY WITHOUT ANGINA PECTORIS: Status: ACTIVE | Noted: 2017-04-20

## 2019-06-03 PROBLEM — I07.1 TRICUSPID INSUFFICIENCY: Status: ACTIVE | Noted: 2019-06-03

## 2019-06-03 PROCEDURE — 1090F PRES/ABSN URINE INCON ASSESS: CPT | Performed by: FAMILY MEDICINE

## 2019-06-03 PROCEDURE — 4040F PNEUMOC VAC/ADMIN/RCVD: CPT | Performed by: FAMILY MEDICINE

## 2019-06-03 PROCEDURE — 1036F TOBACCO NON-USER: CPT | Performed by: FAMILY MEDICINE

## 2019-06-03 PROCEDURE — G0439 PPPS, SUBSEQ VISIT: HCPCS | Performed by: FAMILY MEDICINE

## 2019-06-03 PROCEDURE — 99214 OFFICE O/P EST MOD 30 MIN: CPT | Performed by: FAMILY MEDICINE

## 2019-06-03 PROCEDURE — G8400 PT W/DXA NO RESULTS DOC: HCPCS | Performed by: FAMILY MEDICINE

## 2019-06-03 PROCEDURE — G8427 DOCREV CUR MEDS BY ELIG CLIN: HCPCS | Performed by: FAMILY MEDICINE

## 2019-06-03 PROCEDURE — G8417 CALC BMI ABV UP PARAM F/U: HCPCS | Performed by: FAMILY MEDICINE

## 2019-06-03 PROCEDURE — G8599 NO ASA/ANTIPLAT THER USE RNG: HCPCS | Performed by: FAMILY MEDICINE

## 2019-06-03 PROCEDURE — 1123F ACP DISCUSS/DSCN MKR DOCD: CPT | Performed by: FAMILY MEDICINE

## 2019-06-03 ASSESSMENT — PATIENT HEALTH QUESTIONNAIRE - PHQ9
SUM OF ALL RESPONSES TO PHQ QUESTIONS 1-9: 2
2. FEELING DOWN, DEPRESSED OR HOPELESS: 1
SUM OF ALL RESPONSES TO PHQ QUESTIONS 1-9: 2
SUM OF ALL RESPONSES TO PHQ9 QUESTIONS 1 & 2: 2
1. LITTLE INTEREST OR PLEASURE IN DOING THINGS: 1

## 2019-06-03 ASSESSMENT — LIFESTYLE VARIABLES: HOW OFTEN DO YOU HAVE A DRINK CONTAINING ALCOHOL: 0

## 2019-06-03 NOTE — PROGRESS NOTES
Medicare Annual Wellness Visit  Name: Mihaela Baird Date: 2019   MRN: [id number] Sex: Female   Age: 80 y.o. Ethnicity: Non-/Non    : 1934 Race: Ara Puentes is here for Medicare AWV (really tired, feels like i could sleep all day. my arms really ache. last night i was up a few hours because my left ear hurt so bad, i did try putting peroxide in it. sometimes using nasal sprays and sometimes uses allergy pill)    Screenings for behavioral, psychosocial and functional/safety risks, and cognitive dysfunction are all negative except as indicated below. These results, as well as other patient data from the [employer] form, are documented in Flowsheets linked to this Encounter. Does feel better than when she was here 5 weeks ago with the cough. Still has a lot of discomfort in her left ear all the time. A lot of the time it is just itching in the ear, not usually pain. More plugged up. Did use some peroxide in the ear over the weekend and this seemed to help some. Also has the pain in the back of her head on the left side a lot. Still has the muscle aches in her arms and the legs also. Manny in the upper arms bilaterally, if she tries to reach up above her head has to lift her arm with the other arm. The leg pain overall is a lot better. Before all the pain started the week prior had been out working in the yard and was literally pulling down the rails in a split rail fence and moving then for several days in a row, wonders if this may have contributed to her ongoing pain in her arms and legs. Does think she was a bit better when she was on the methylprednisone pack, could do a bit more than just laying around all the time. Also had no headache for a while with this and the ears even felt better for a bit    Had a lot of trouble trying to get a root canal this winter-- ended up having it pulled -- has healed up fine.       Allergies   Allergen Reactions    Aggrenox [Aspirin-Dipyridamole Er]      Sever HA    Atorvastatin      Other reaction(s): Muscle cramps    Gabapentin     Prednisone     Vicodin [Hydrocodone-Acetaminophen]        Prior to Visit Medications    Medication Sig Taking?  Authorizing Provider   fluticasone (FLONASE) 50 MCG/ACT nasal spray instill 2 sprays into each nostril once daily Yes Silva Ram MD   hydrochlorothiazide (HYDRODIURIL) 25 MG tablet TAKE 1 TABLET EVERY DAY Yes Silva Ram MD   carvedilol (COREG) 6.25 MG tablet TAKE 1 TABLET TWICE DAILY Yes Silva Ram MD   ipratropium (ATROVENT) 0.06 % nasal spray 2 sprays by Nasal route 4 times daily Yes MORALES Johns - CNP   montelukast (SINGULAIR) 10 MG tablet Take 1 tablet by mouth nightly Yes Silva Ram MD   loratadine (CLARITIN) 10 MG tablet Take 1 tablet by mouth daily Yes Silva Ram MD   Misc Natural Products (OSTEO BI-FLEX JOINT SHIELD PO) Take by mouth Yes Historical Provider, MD SOTOIGAN 0.01 % SOLN ophthalmic drops  Yes Historical Provider, MD   Multiple Vitamins-Minerals (MULTIVITAMIN WITH MINERALS) tablet Take 1 tablet by mouth daily Yes Silva Ram MD   aspirin EC 81 MG EC tablet Take 1 tablet by mouth daily Yes Silva Ram MD   timolol (TIMOPTIC) 0.5 % ophthalmic solution Place 1 drop into both eyes daily  Historical Provider, MD   timolol (TIMOPTIC-XE) 0.25 % ophthalmic gel-forming Place 1 drop into the left eye daily Per opthamologist  Silva Ram MD   Psyllium (METAMUCIL PO) Take 5 capsules by mouth 2 times daily  Historical Provider, MD   Cholecalciferol (VITAMIN D) 2000 units CAPS capsule Take by mouth daily  Historical Provider, MD   Probiotic Product (PROBIOTIC DAILY PO) Take by mouth  Historical Provider, MD   albuterol sulfate HFA (VENTOLIN HFA) 108 (90 Base) MCG/ACT inhaler Inhale 2 puffs into the lungs every 6 hours as needed for Wheezing  James Hopson MD   Cranberry 500 MG CAPS Take 500 mg by mouth 2 times daily  Corey Sethi MD   Omega-3 Fatty Acids (FISH OIL) 1000 MG CAPS Take 1 capsule by mouth daily  Corey Sethi MD   Glucosamine-Chondroitin 250-200 MG TABS Take 2 capsules by mouth daily  Corey Sethi MD   Calcium Polycarbophil (FIBER) 625 MG TABS Take 1 tablet by mouth daily  Corey Sethi MD   Lactobacillus (PROBIOTIC ACIDOPHILUS) TABS Take 1 tablet by mouth daily  Corey Sethi MD       Past Medical History:   Diagnosis Date    Cancer [facility])     Skin cancer    [facility] [address]     Glaucoma     Glaucoma     Hypercholesteremia     Hyperlipidemia     Hypertension     Lumbar spinal stenosis     Meningioma ([facility] [address]     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    Pulmonary embolism ([facility] [address]     PVD (peripheral vascular disease) [facility] [address] [facility]     Thyroid nodule     Thyroid nodule     Valvular heart disease      Past Surgical History:   Procedure Laterality Date    BRAIN SURGERY  2010    meningioma    BRAIN SURGERY Left     to remove meningioma    CATARACT REMOVAL Bilateral 2012    COLONOSCOPY  04/2006    with polypectomy    CYST REMOVAL  1996    pancreatic    HYSTERECTOMY      JOINT REPLACEMENT      KNEE ARTHROSCOPY  1999    OTHER SURGICAL HISTORY  2011    injections for bulging discs    STERNUM FRACTURE SURGERY  1996       Family History   Problem Relation Age of Onset    Cancer Mother         skin    Heart Disease Father     High Blood Pressure Father     Stroke Father     Coronary Art Dis Father     Heart Attack Father     Cancer Sister         melanoma    Cancer Brother         skin       CareTeam (Including outside providers/suppliers regularly involved in providing care):   Patient Care Team:  Corey Sethi MD as PCP - General (Family Medicine)  Corey Sethi MD as PCP - [facility] Empaneled Provider  Clarissa Espinoza MD as Consulting Physician (Internal Medicine Cardiovascular Disease)  Ry Birmingham MD as Consulting Physician (Hematology and Oncology)    Wt Readings from Last 3 Encounters:   06/03/19 172 lb (78 kg)   05/13/19 173 lb (78.5 kg)   05/04/19 171 lb (77.6 kg)     Vitals:    06/03/19 1032   BP: 136/74   Pulse: 80   Weight: 172 lb (78 kg)     Body mass index is 29.07 kg/m². Based upon direct observation of the patient, evaluation of cognition reveals recent and remote memory intact. Physical Exam   Constitutional: She is oriented to person, place, and time. She appears well-developed and well-nourished. Appears fatigued otherwise is unremakrable, anxious   HENT:   Head: Normocephalic and atraumatic. Right Ear: Tympanic membrane, external ear and ear canal normal. Decreased hearing is noted. Left Ear: Tympanic membrane, external ear and ear canal normal. Decreased hearing is noted. Minimally decreased hearing bilaterally   Eyes: Conjunctivae and EOM are normal.   Neck: Normal range of motion. Neck supple. Cardiovascular: Normal rate, regular rhythm, S1 normal and intact distal pulses. Exam reveals no gallop and no friction rub. Murmur (2/6) heard. Systolic murmur is present with a grade of 2/6. Prominent S2 at the RSB and soft systolic murmur   Pulmonary/Chest: Effort normal and breath sounds normal. No respiratory distress. Abdominal: Soft. She exhibits no distension and no mass. There is no tenderness. There is no rebound and no guarding. No hernia. No CVA tenderness noted   Musculoskeletal: She exhibits no edema or tenderness (indicates pain in the upper arms and the upper legs bilaterally). Lymphadenopathy:     She has no cervical adenopathy. Neurological: She is alert and oriented to person, place, and time. She displays normal reflexes. No cranial nerve deficit. Coordination normal.   Skin: Skin is warm. Psychiatric: She has a normal mood and affect. Her behavior is normal. Judgment and thought content normal.   Nursing note and vitals reviewed.         Patient's complete Health Risk Assessment and screening values have been reviewed and are found in Flowsheets. The following problems were reviewed today and where indicated follow up appointments were made and/or referrals ordered. Diagnosis Orders   1. Routine general medical examination at a health care facility     2. Meningioma, cerebral ([address]  MRI BRAIN W CONTRAST   3. Neck pain on left side  XR Cervical Spine 2 or 3 VW   4. Asymptomatic menopausal state  DEXA Bone Density 2 Sites   5. Bilateral arm pain  [facility]     Suspect her ongoing pain sx are realted to significant muscle and tendon overuse from her activities just prior to the pain beginning. With the history of brain tumor will go ahead and check the MRI to eval the residual meningioma present. IF this is stable and her pain continues then would consider a round of physical therapy for her neck and arm pain. If this is nor helpful then would consider referral to pain management for her neck pain and HA on the left side. Positive Risk Factor Screenings with Interventions:     General Health:  General  In general, how would you say your health is?: Fair  In the past 7 days, have you experienced any of the following? New or Increased Pain, New or Increased Fatigue, Loneliness, Social Isolation, Stress or Anger?: (!) New or Increased Pain, New or Increased Fatigue(not sleeping, new ear pain)  Do you get the social and emotional support that you need?: (!) No(states maybe if someone was there more)  Do you have a Living Will?: Yes  General Health Risk Interventions:  · Pain issues: see above    Health Habits/Nutrition:  Health Habits/Nutrition  Do you exercise for at least 20 minutes 2-3 times per week?: (!) No  Have you lost any weight without trying in the past 3 months?: No  Do you eat fewer than 2 meals per day?: No  Have you seen a dentist within the past year?: Yes  Body mass index is 29.07 kg/m².   Health Habits/Nutrition Interventions:  · has not been in written form: see Patient Instructions/AVS.

## 2019-06-05 ENCOUNTER — TELEPHONE (OUTPATIENT)
Dept: FAMILY MEDICINE CLINIC | Age: 84
End: 2019-06-05

## 2019-06-05 DIAGNOSIS — M35.3 POLYMYALGIA RHEUMATICA SYNDROME (HCC): Primary | ICD-10-CM

## 2019-06-09 RX ORDER — TIMOLOL MALEATE 5 MG/ML
1 SOLUTION/ DROPS OPHTHALMIC DAILY
Refills: 1 | COMMUNITY
Start: 2019-06-04 | End: 2022-10-17

## 2019-06-10 NOTE — TELEPHONE ENCOUNTER
Spoke with Her daughter Brenda Palm re the MRI showing an increase in size in the meningioma of the left cerebrum area. She continues to have some HA but the bigger issue for her is the significant muscle aches in her upper arms and legs and the weak feeling all over. The sed rate was very high as was the CRP. Did feel better on the methylprednisolone. HA was better, leg and arm pain better. After office visit even the muscle testing was really difficult and made her hurt more. In light of this daughter Brenda Palm and I agree we will hold on referral for the meningioma and focus on the muscle aches at this time,  Will refer to rheumatology for evaluation for possible PMR> in the meantime will restart the methylprednisolone. Script put in. Referral also put in.

## 2019-06-10 NOTE — TELEPHONE ENCOUNTER
Nargis Stark returned your call informed her that you were seeing patients and I would let you know she called

## 2019-06-11 ENCOUNTER — TELEPHONE (OUTPATIENT)
Dept: FAMILY MEDICINE CLINIC | Age: 84
End: 2019-06-11

## 2019-06-11 RX ORDER — METHYLPREDNISOLONE 4 MG/1
4 TABLET ORAL DAILY
Qty: 100 TABLET | Refills: 1 | Status: SHIPPED | OUTPATIENT
Start: 2019-06-11 | End: 2019-07-08 | Stop reason: SDUPTHER

## 2019-06-11 NOTE — TELEPHONE ENCOUNTER
Spoke with Matilde and sent in the methylprednisolone and put in the referral for rheumatology as well.

## 2019-06-11 NOTE — TELEPHONE ENCOUNTER
Needing a medication clarification, order read take 1 tab daily , take 4 tabs daily for 4 days and then 3 tabs daily    Also need a dx     Please advise

## 2019-06-12 NOTE — TELEPHONE ENCOUNTER
DX: polymyalgia rheumatica, take for the 4 tabs etc-- disregard the 1 tab daily, not sure how that got in there.

## 2019-06-20 ENCOUNTER — TELEPHONE (OUTPATIENT)
Dept: FAMILY MEDICINE CLINIC | Age: 84
End: 2019-06-20

## 2019-06-20 NOTE — TELEPHONE ENCOUNTER
Pt's daughter called upset that pt had received two calls for rheumatology appointments. One was from dr Milo Renee office and another from dr Burton Lundy. Stated that Dr Alessandro Andino was making the appointment with Dr Addy Hancock and now she is getting a call for dr Milo Renee in [facility]. Daughter would like all calls directed to her and not patient from now on. Daughter would also like a call back from DR Alessandro Andino.

## 2019-06-21 NOTE — TELEPHONE ENCOUNTER
I spoke with Felicia Lopez and Bertrand Walsh both about referring to rheumatology. Referral then was put in. Not sure where 2 referrals would be put in. Please check in to this. Appears that Dr. Hazel Yeboah had also put in a referral.  I am fine with her seeing which ever rheumatologist can see her most quickly.   Thanks

## 2019-06-21 NOTE — TELEPHONE ENCOUNTER
I spoke with Kentrell Rather, told her Dr Malik Del Castillo was not aware that Dr. Sanchez Chicas also referred pt to Rheumatology. Told her that Dr. Malik Del Castillo did not care who she saw just wants her to see somebody, whoever can get her in sooner. Pfeiffer Rather was understanding and said she no longer needs a call back from Dr. Malik Del Castillo. She also stated she would like called in the future for things like this due to her mom not being able to understand it all. Registration for pt is updated so we call Kentrell Rather.

## 2019-07-08 RX ORDER — METHYLPREDNISOLONE 4 MG/1
4 TABLET ORAL DAILY
Qty: 104 TABLET | Refills: 0 | Status: SHIPPED | OUTPATIENT
Start: 2019-07-08 | End: 2020-05-06 | Stop reason: ALTCHOICE

## 2019-07-09 ENCOUNTER — TELEPHONE (OUTPATIENT)
Dept: FAMILY MEDICINE CLINIC | Age: 84
End: 2019-07-09

## 2019-07-09 RX ORDER — METHYLPREDNISOLONE 4 MG/1
4 TABLET ORAL DAILY
Qty: 104 TABLET | Refills: 0 | Status: CANCELLED | OUTPATIENT
Start: 2019-07-09

## 2019-07-09 NOTE — TELEPHONE ENCOUNTER
Yes that is the directions. 94 is fine. I would like her to wean but this would be on the directions of the rheumatologist. Please make sure she has this appointment set up [facility]!

## 2019-07-10 NOTE — TELEPHONE ENCOUNTER
No answer on Emi's phone, wanted to see when her rheumatology appointment is scheduled for.     Looks as thought it was 7/3 /2019 need to verify she was seen

## 2019-07-16 LAB
AGE FOR GFR: 84
ANION GAP SERPL CALCULATED.3IONS-SCNC: 13 MMOL/L
BUN BLDV-MCNC: 23 MG/DL (ref 7–17)
C-REACTIVE PROTEIN: 1 MG/DL (ref 0–1)
CALCIUM SERPL-MCNC: 10 MG/DL (ref 8.4–10.2)
CHLORIDE BLD-SCNC: 104 MMOL/L (ref 98–120)
CO2: 27 MMOL/L (ref 22–31)
CREAT SERPL-MCNC: 0.9 MG/DL (ref 0.5–1)
DIFFERENTIAL: MANUAL DIFF
EGFR BF: 72 ML/MIN/1.73 M2
EGFR BM: 97 ML/MIN/1.73 M2
EGFR WF: 60 ML/MIN/1.73 M2
EGFR WM: 80 ML/MIN/1.73 M2
GLUCOSE: 115 MG/DL (ref 65–105)
HCT VFR BLD CALC: 38.9 % (ref 37–47)
HEMOGLOBIN: 12.5 G/DL (ref 12–16)
LYMPHOCYTES # BLD: 76 % (ref 21–51)
MCH RBC QN AUTO: 29.9 PG (ref 28.5–32)
MCHC RBC AUTO-ENTMCNC: 32.1 G/DL (ref 32–37)
MCV RBC AUTO: 93 FL (ref 80–94)
NEUTROPHILS: 24 % (ref 42–75)
PDW BLD-RTO: 14.7 % (ref 8.5–15.5)
PLATELET # BLD: 236 THOU/MM3 (ref 130–400)
PMV BLD AUTO: 7.4 FL (ref 7.4–11)
POTASSIUM SERPL-SCNC: 4.3 MMOL/L (ref 3.6–5)
RBC # BLD: 4.18 M/UL (ref 4.2–5.4)
SEDIMENTATION RATE, ERYTHROCYTE: 24 MM/HR (ref 0–30)
SODIUM BLD-SCNC: 140 MMOL/L (ref 135–145)
T3 FREE: NORMAL
T4 FREE: 0.81 NG/DL (ref 0.78–2.1)
TOTAL CK: 75 UNITS/L (ref 25–170)
TSH SERPL DL<=0.05 MIU/L-ACNC: 0.72 MIU/ML (ref 0.49–4.6)
WBC # BLD: 35.28 THOU/ML3 (ref 4.8–10)

## 2019-08-12 ENCOUNTER — TELEPHONE (OUTPATIENT)
Dept: FAMILY MEDICINE CLINIC | Age: 84
End: 2019-08-12

## 2019-08-12 ENCOUNTER — OFFICE VISIT (OUTPATIENT)
Dept: FAMILY MEDICINE CLINIC | Age: 84
End: 2019-08-12
Payer: MEDICARE

## 2019-08-12 VITALS
WEIGHT: 168.6 LBS | DIASTOLIC BLOOD PRESSURE: 66 MMHG | TEMPERATURE: 99 F | BODY MASS INDEX: 28.79 KG/M2 | OXYGEN SATURATION: 99 % | HEART RATE: 68 BPM | HEIGHT: 64 IN | SYSTOLIC BLOOD PRESSURE: 128 MMHG
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
## 2019-08-12 PROCEDURE — 1036F TOBACCO NON-USER: CPT | Performed by: NURSE PRACTITIONER

## 2019-08-12 PROCEDURE — G8427 DOCREV CUR MEDS BY ELIG CLIN: HCPCS | Performed by: NURSE PRACTITIONER

## 2019-08-12 PROCEDURE — G8417 CALC BMI ABV UP PARAM F/U: HCPCS | Performed by: NURSE PRACTITIONER

## 2019-08-12 PROCEDURE — 1090F PRES/ABSN URINE INCON ASSESS: CPT | Performed by: NURSE PRACTITIONER

## 2019-08-12 PROCEDURE — 99214 OFFICE O/P EST MOD 30 MIN: CPT | Performed by: NURSE PRACTITIONER

## 2019-08-12 PROCEDURE — G8599 NO ASA/ANTIPLAT THER USE RNG: HCPCS | Performed by: NURSE PRACTITIONER

## 2019-08-12 PROCEDURE — G8400 PT W/DXA NO RESULTS DOC: HCPCS | Performed by: NURSE PRACTITIONER

## 2019-08-12 PROCEDURE — 1123F ACP DISCUSS/DSCN MKR DOCD: CPT | Performed by: NURSE PRACTITIONER

## 2019-08-12 PROCEDURE — 4040F PNEUMOC VAC/ADMIN/RCVD: CPT | Performed by: NURSE PRACTITIONER

## 2019-08-12 RX ORDER — DOXYCYCLINE HYCLATE 100 MG/1
100 CAPSULE ORAL 2 TIMES DAILY
Qty: 42 CAPSULE | Refills: 0 | Status: SHIPPED | OUTPATIENT
Start: 2019-08-12 | End: 2019-08-28 | Stop reason: DRUGHIGH

## 2019-08-12 ASSESSMENT — ENCOUNTER SYMPTOMS
DIARRHEA: 0
RHINORRHEA: 0
SHORTNESS OF BREATH: 0
SORE THROAT: 0
COUGH: 0
EYE PAIN: 0
NAIL CHANGES: 0
VOMITING: 0

## 2019-08-12 NOTE — PROGRESS NOTES
[address] [place] [address] [address] [facility], APRN-CNP  [address]  Phone:  [phone]  Fax:  [phone]  Kin Griggs is a 80 y.o. female who presents today for her medical conditions/complaints as noted below. Kin Griggs c/o of Insect Bite (Tick found on above left hip area 8/1/19. (has tick with her) Now area is red, some itching.)      HPI:     Rash   This is a new problem. Episode onset: Found tick on August 1st and removed it. No rash at that time. Rash began around 8/5 and has gotten larger since then. The problem has been gradually worsening since onset. The affected locations include the left hip. The rash is characterized by redness and swelling. She was exposed to an insect bite/sting (tick bite). Associated symptoms include fatigue (maybe a bit more than  normal). Pertinent negatives include no anorexia, congestion, cough, diarrhea, eye pain, facial edema, fever, joint pain, nail changes, rhinorrhea, shortness of breath, sore throat or vomiting. Treatments tried: H2O2 and alcohol and antibiotic ointment. The treatment provided no relief.  (Polymyalgia rheumatica)       Wt Readings from Last 3 Encounters:   08/12/19 168 lb 9.6 oz (76.5 kg)   06/03/19 172 lb (78 kg)   05/13/19 173 lb (78.5 kg)       Temp Readings from Last 3 Encounters:   08/12/19 99 °F (37.2 °C) (Tympanic)   05/13/19 98.5 °F (36.9 °C)   05/04/19 99.9 °F (37.7 °C)       BP Readings from Last 3 Encounters:   08/12/19 128/66   06/03/19 136/74   05/13/19 132/76       Pulse Readings from Last 3 Encounters:   08/12/19 68   06/03/19 80   05/13/19 83              Past Medical History:   Diagnosis Date    Cancer [address] [address]     Skin cancer    Chronic lymphatic leukemia ([facility] [address]     Glaucoma     Glaucoma     Hypercholesteremia     Hyperlipidemia     Hypertension     Lumbar spinal stenosis     Meningioma ([facility] [address]     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    Pulmonary embolism breath. Cardiovascular: Negative for chest pain and palpitations. Gastrointestinal: Negative for anorexia, diarrhea and vomiting. Musculoskeletal: Negative for joint pain. No increase in muscular and joint pain than normal.   Skin: Positive for rash. Negative for nail changes. Neurological: Negative for headaches. Hematological: Negative for adenopathy. Objective:     /66 (Site: Left Upper Arm, Position: Sitting, Cuff Size: Medium Adult)   Pulse 68   Temp 99 °F (37.2 °C) (Tympanic)   Ht 5' 3.5\" (1.613 m)   Wt 168 lb 9.6 oz (76.5 kg)   SpO2 99%   BMI 29.40 kg/m²     Physical Exam   Constitutional: She is oriented to person, place, and time. Vital signs are normal. She appears well-developed and well-nourished. No distress. HENT:   Head: Normocephalic. Right Ear: External ear normal.   Left Ear: External ear normal.   Eyes: Conjunctivae and EOM are normal. Right eye exhibits no discharge. Left eye exhibits no discharge. No scleral icterus. Neck: Normal range of motion. Neck supple. Cardiovascular: Normal rate, regular rhythm and normal heart sounds. Exam reveals no friction rub. No murmur heard. Pulmonary/Chest: Effort normal and breath sounds normal. No respiratory distress. Musculoskeletal: Normal range of motion. Neurological: She is alert and oriented to person, place, and time. No cranial nerve deficit. Coordination normal.   Skin: Skin is warm, dry and intact. Capillary refill takes less than 2 seconds. Rash noted. Rash is macular. She is not diaphoretic. There is erythema. Psychiatric: She has a normal mood and affect. Her speech is normal and behavior is normal. Judgment and thought content normal. Cognition and memory are normal.   Nursing note and vitals reviewed. Assessment:      Diagnosis Orders   1. Tick bite, initial encounter  doxycycline hyclate (VIBRAMYCIN) 100 MG capsule     No results found for this visit on 08/12/19.             Plan: skin. If part of the tick stays in the skin, leave it alone. It will likely come out on its own in a few days. · Ticks can come into your house on clothing, outdoor gear, and pets. These ticks can fall off and attach to you. ? Check your clothing and outdoor gear. Remove any ticks you find. Then put your clothing in a clothes dryer on high heat for 1 hour to kill any ticks that might remain. ? Check your pets for ticks after they have been outdoors. When should you call for help? Call your doctor now or seek immediate medical care if:    · You are confused or cannot think clearly.     · You have a headache or stiff neck.     · You have a new or worse rash.     · You have symptoms of infection, such as:  ? Increased pain, swelling, warmth, or redness. ? Red streaks leading from the area. ? Pus draining from the area. ? A fever.    Watch closely for changes in your health, and be sure to contact your doctor if:    · You have new or worse weakness or muscle pain.     · You have new joint pain.     · You do not get better as expected. Where can you learn more? Go to [email or web address].[email or web address]. org and sign in to your [employer] account. Enter Y599 in the Setred box to learn more about \"Lyme Disease: Care Instructions. \"     If you do not have an account, please click on the \"Sign Up Now\" link. Current as of: July 30, 2018  Content Version: 12.1  © 0826-9272 [employer]. Care instructions adapted under license by [facility] ([facility]). If you have questions about a medical condition or this instruction, always ask your healthcare professional. [place] [address] any warranty or liability for your use of this information. Patient/Caregiver instructed on use, benefit, and side effects of prescribed medications. All patient/parent/caregiver questions answered. Patient/parent/caregiver voiced understanding. Reviewed health maintenance.   Instructed to continue current medications, diet and exercise. Patient agreed with treatment plan. Follow up as directed.            Electronically signed by MORALES Teresa CNP on8/12/2019

## 2019-08-12 NOTE — PATIENT INSTRUCTIONS
Patient Education      Doxycycline 100 mg twice daily for 21 days. Eat yogurt 2 hours after you take the doxycycline. We will treat as possible Lyme's. This does not mean you have Lyme's. We are just being extra careful. If the rash grows larger than 2 inches please let me know. Or if you develop other areas with rash. Follow up with primary care provider in 1 to 2 days if needed. Tick Bite: Care Instructions  Your Care Instructions    Ticks are small spiderlike animals. They bite to fasten themselves onto your skin and feed on your blood. Ticks can carry diseases. But most ticks do not carry diseases, and most tick bites do not cause serious health problems. Some people may have an allergic reaction to a tick bite. This reaction may be mild, with symptoms like itching and swelling. In rare cases, a severe allergic reaction may occur. Most of the time, all you need to do for a tick bite is relieve any symptoms you may have. Follow-up care is a key part of your treatment and safety. Be sure to make and go to all appointments, and call your doctor if you are having problems. It's also a good idea to know your test results and keep a list of the medicines you take. How can you care for yourself at home? · Put ice or a cold pack on the bite for 15 to 20 minutes once an hour. Put a thin cloth between the ice and your skin. · Try an over-the-counter medicine to relieve itching, redness, swelling, and pain. Be safe with medicines. Read and follow all instructions on the label. ? Take an antihistamine medicine, such as a nondrowsy one like loratadine (Claritin) or one that might make you sleepy like diphenhydramine (Benadryl). These medicines may help relieve itching, redness, and swelling. ? Use a spray of local anesthetic that contains benzocaine, such as Solarcaine. It may help relieve pain. If your skin reacts to the spray, stop using it. ? Put calamine lotion on the skin.  It may help relieve itching. To avoid tick bites  · Avoid ticks:  ? Learn where ticks are found in your community, and stay away from those areas if possible. ? Cover as much of your body as possible when you work or play in grassy or wooded areas. ? Use insect repellents, such as products containing DEET. You can spray them on your skin. ? Take steps to control ticks on your property if you live in an area where Lyme disease occurs. Clear leaves, brush, tall grasses, woodpiles, and stone fences from around your house and the edges of your yard or garden. This may help get rid of ticks. · When you come in from outdoors, check your body for ticks, including your groin, head, and underarms. The ticks may be about the size of a sesame seed. If no one else can help you check for ticks on your scalp, comb your hair with a fine-tooth comb. · If you find a tick, remove it quickly. Use tweezers to grasp the tick as close to its mouth (the part in your skin) as possible. Slowly pull the tick straight out--do not twist or yank--until its mouth releases from your skin. If part of the tick stays in the skin, leave it alone. It will likely come out on its own in a few days. · Ticks can come into your house on clothing, outdoor gear, and pets. These ticks can fall off and attach to you. ? Check your clothing and outdoor gear. Remove any ticks you find. Then put your clothing in a clothes dryer on high heat for 1 hour to kill any ticks that might remain. ? Check your pets for ticks after they have been outdoors. When should you call for help? Call 911 anytime you think you may need emergency care. For example, call if:    · You have symptoms of a severe allergic reaction. These may include:  ? Sudden raised, red areas (hives) all over your body. ? Swelling of the throat, mouth, lips, or tongue. ? Trouble breathing. ? Passing out (losing consciousness).  Or you may feel very lightheaded or suddenly feel weak, confused, or not stop taking them just because you feel better. You need to take the full course of antibiotics. · Take an over-the-counter pain medicine if needed, such as acetaminophen (Tylenol), ibuprofen (Advil, Motrin), or naproxen (Aleve). Read and follow all instructions on the label. To prevent Lyme disease in the future  · Avoid ticks:  ? Learn where ticks are found in your community, and stay away from those areas if possible. ? Cover as much of your body as possible when you work or play in grassy or wooded areas. ? Use insect repellents, such as products containing DEET. You can spray them on your skin. ? Use products that contain 0.5% permethrin on your clothing and outdoor gear, such as your tent. You can also buy clothing already treated with permethrin. ? Take steps to control ticks on your property if you live in an area where Lyme disease occurs. Clear leaves, brush, tall grasses, woodpiles, and stone fences from around your house and the edges of your yard or garden. This may help get rid of ticks. · When you come in from outdoors, check your body for ticks, including your groin, head, and underarms. The ticks may be about the size of a poppy seed. If no one else can help you check for ticks on your scalp, comb your hair with a fine-tooth comb. · If you find a tick, remove it quickly. If you can't remove it with your fingers, use tweezers to grasp the tick as close to its mouth (the part in your skin) as possible. Slowly pull the tick straight out--do not twist or yank--until its mouth releases from your skin. If part of the tick stays in the skin, leave it alone. It will likely come out on its own in a few days. · Ticks can come into your house on clothing, outdoor gear, and pets. These ticks can fall off and attach to you. ? Check your clothing and outdoor gear. Remove any ticks you find. Then put your clothing in a clothes dryer on high heat for 1 hour to kill any ticks that might remain. ?  Check

## 2019-08-22 ENCOUNTER — OFFICE VISIT (OUTPATIENT)
Dept: FAMILY MEDICINE CLINIC | Age: 84
End: 2019-08-22
Payer: MEDICARE

## 2019-08-22 VITALS
BODY MASS INDEX: 29.19 KG/M2 | SYSTOLIC BLOOD PRESSURE: 156 MMHG | WEIGHT: 171 LBS | HEIGHT: 64 IN | OXYGEN SATURATION: 97 % | TEMPERATURE: 99.3 F | DIASTOLIC BLOOD PRESSURE: 70 MMHG | HEART RATE: 82 BPM

## 2019-08-22 DIAGNOSIS — R10.84 GENERALIZED ABDOMINAL PAIN: ICD-10-CM

## 2019-08-22 DIAGNOSIS — R11.0 NAUSEA: ICD-10-CM

## 2019-08-22 DIAGNOSIS — R00.2 PALPITATION: ICD-10-CM

## 2019-08-22 DIAGNOSIS — R53.83 FATIGUE, UNSPECIFIED TYPE: Primary | ICD-10-CM

## 2019-08-22 DIAGNOSIS — R53.1 WEAKNESS: ICD-10-CM

## 2019-08-22 LAB
A/G RATIO: 1.4 RATIO
AGE FOR GFR: 84
ALBUMIN: 4.2 G/DL (ref 3.5–5)
ALK PHOSPHATASE: 85 UNITS/L (ref 38–126)
ALT SERPL-CCNC: 22 UNITS/L (ref 9–52)
ANION GAP SERPL CALCULATED.3IONS-SCNC: 13 MMOL/L
AST SERPL-CCNC: 27 UNITS/L (ref 14–36)
B-TYPE NATRIURETIC PEPTIDE: 36 PG/ML (ref 0–100)
BANDS: 0 % (ref 0–5)
BASOPHILS # BLD: 0 % (ref 0–1)
BILIRUB SERPL-MCNC: 0.5 MG/DL (ref 0.2–1.3)
BUN BLDV-MCNC: 30 MG/DL (ref 7–17)
CALCIUM SERPL-MCNC: 10.2 MG/DL (ref 8.4–10.2)
CHLORIDE BLD-SCNC: 103 MMOL/L (ref 98–120)
CO2: 30 MMOL/L (ref 22–31)
CREAT SERPL-MCNC: 0.7 MG/DL (ref 0.5–1)
D DIMER: 2385 NG/ML (ref 0–245)
DIFFERENTIAL: MANUAL DIFF
EGFR BF: 96 ML/MIN/1.73 M2
EGFR BM: 130 ML/MIN/1.73 M2
EGFR WF: 80 ML/MIN/1.73 M2
EGFR WM: 107 ML/MIN/1.73 M2
EOSINOPHIL # BLD: 0 % (ref 0–10)
GLOBULIN: 3 G/DL
GLUCOSE: 145 MG/DL (ref 65–105)
HCT VFR BLD CALC: 40.6 % (ref 37–47)
HEMOGLOBIN: 12.9 G/DL (ref 12–16)
LIPASE: 139 UNITS/L (ref 23–300)
LYMPHOCYTES # BLD: 60 % (ref 21–51)
LYMPHOCYTES VARIANT: 3 % (ref 0–5)
MCH RBC QN AUTO: 29.7 PG (ref 28.5–32)
MCHC RBC AUTO-ENTMCNC: 31.8 G/DL (ref 32–37)
MCV RBC AUTO: 93.3 FL (ref 80–94)
MONOCYTES: 0 % (ref 2–9)
MORPHOLOGY: NORMAL
NEUTROPHILS: 37 % (ref 42–75)
PDW BLD-RTO: 14.5 % (ref 8.5–15.5)
PLATELET # BLD: 207 THOU/MM3 (ref 130–400)
PMV BLD AUTO: 8 FL (ref 7.4–11)
POTASSIUM SERPL-SCNC: 4.4 MMOL/L (ref 3.6–5)
RBC # BLD: 4.35 M/UL (ref 4.2–5.4)
SODIUM BLD-SCNC: 142 MMOL/L (ref 135–145)
TOTAL PROTEIN: 7.2 G/DL (ref 6.3–8.2)
TROPONIN: <0.012 NG/ML (ref 0–0)
WBC # BLD: 36.55 THOU/ML3 (ref 4.8–10)

## 2019-08-22 PROCEDURE — 99214 OFFICE O/P EST MOD 30 MIN: CPT | Performed by: NURSE PRACTITIONER

## 2019-08-22 PROCEDURE — 4040F PNEUMOC VAC/ADMIN/RCVD: CPT | Performed by: NURSE PRACTITIONER

## 2019-08-22 PROCEDURE — G8599 NO ASA/ANTIPLAT THER USE RNG: HCPCS | Performed by: NURSE PRACTITIONER

## 2019-08-22 PROCEDURE — G8427 DOCREV CUR MEDS BY ELIG CLIN: HCPCS | Performed by: NURSE PRACTITIONER

## 2019-08-22 PROCEDURE — G8400 PT W/DXA NO RESULTS DOC: HCPCS | Performed by: NURSE PRACTITIONER

## 2019-08-22 PROCEDURE — 1036F TOBACCO NON-USER: CPT | Performed by: NURSE PRACTITIONER

## 2019-08-22 PROCEDURE — 1090F PRES/ABSN URINE INCON ASSESS: CPT | Performed by: NURSE PRACTITIONER

## 2019-08-22 PROCEDURE — 93000 ELECTROCARDIOGRAM COMPLETE: CPT | Performed by: INTERNAL MEDICINE

## 2019-08-22 PROCEDURE — 1123F ACP DISCUSS/DSCN MKR DOCD: CPT | Performed by: NURSE PRACTITIONER

## 2019-08-22 PROCEDURE — G8417 CALC BMI ABV UP PARAM F/U: HCPCS | Performed by: NURSE PRACTITIONER

## 2019-08-22 RX ORDER — ONDANSETRON 4 MG/1
4 TABLET, FILM COATED ORAL 3 TIMES DAILY PRN
Qty: 30 TABLET | Refills: 0 | Status: SHIPPED | OUTPATIENT
Start: 2019-08-22 | End: 2019-12-02 | Stop reason: SDUPTHER

## 2019-08-22 ASSESSMENT — ENCOUNTER SYMPTOMS
ABDOMINAL PAIN: 1
COUGH: 0
NAUSEA: 1

## 2019-08-22 NOTE — PROGRESS NOTES
hydrochlorothiazide (HYDRODIURIL) 25 MG tablet TAKE 1 TABLET EVERY DAY 90 tablet 3    carvedilol (COREG) 6.25 MG tablet TAKE 1 TABLET TWICE DAILY 180 tablet 3    LUMIGAN 0.01 % SOLN ophthalmic drops       Cranberry 500 MG CAPS Take 500 mg by mouth 2 times daily 60 capsule 3    Calcium Polycarbophil (FIBER) 625 MG TABS Take 1 tablet by mouth daily 14 tablet 0    Lactobacillus (PROBIOTIC ACIDOPHILUS) TABS Take 1 tablet by mouth daily 60 tablet 0    aspirin EC 81 MG EC tablet Take 1 tablet by mouth daily 30 tablet 3    timolol (TIMOPTIC-XE) 0.25 % ophthalmic gel-forming Place 1 drop into the left eye daily Per opthamologist 1 Bottle 4    fluticasone (FLONASE) 50 MCG/ACT nasal spray instill 2 sprays into each nostril once daily (Patient not taking: Reported on 8/22/2019) 1 Bottle 5    ipratropium (ATROVENT) 0.06 % nasal spray 2 sprays by Nasal route 4 times daily (Patient not taking: Reported on 8/22/2019) 1 Bottle 3    montelukast (SINGULAIR) 10 MG tablet Take 1 tablet by mouth nightly (Patient not taking: Reported on 8/12/2019) 30 tablet 11    loratadine (CLARITIN) 10 MG tablet Take 1 tablet by mouth daily (Patient not taking: Reported on 8/22/2019) 30 tablet 3    Misc Natural Products (OSTEO BI-FLEX JOINT SHIELD PO) Take by mouth      Psyllium (METAMUCIL PO) Take 5 capsules by mouth 2 times daily      Cholecalciferol (VITAMIN D) 2000 units CAPS capsule Take by mouth daily      Probiotic Product (PROBIOTIC DAILY PO) Take by mouth      albuterol sulfate HFA (VENTOLIN HFA) 108 (90 Base) MCG/ACT inhaler Inhale 2 puffs into the lungs every 6 hours as needed for Wheezing (Patient not taking: Reported on 8/12/2019) 1 Inhaler 3    Omega-3 Fatty Acids (FISH OIL) 1000 MG CAPS Take 1 capsule by mouth daily (Patient not taking: Reported on 8/22/2019) 90 capsule 3    Multiple Vitamins-Minerals (MULTIVITAMIN WITH MINERALS) tablet Take 1 tablet by mouth daily (Patient not taking: Reported on 8/12/2019) 30 tablet 3 Enter X186 in the [facility] box to learn more about \"Fatigue: Care Instructions. \"     If you do not have an account, please click on the \"Sign Up Now\" link. Current as of: September 23, 2018  Content Version: 12.1  © 1130-1438 Healthwise, Incorporated. Care instructions adapted under license by [facility] [place] ([facility]). If you have questions about a medical condition or this instruction, always ask your healthcare professional. [place] [id number] any warranty or liability for your use of this information. Patient/Caregiver instructed on use, benefit, and side effects of prescribed medications. All patient/parent/caregiver questions answered. Patient/parent/caregiver voiced understanding. Reviewed health maintenance. Instructed to continue current medications, diet and exercise. Patient agreed with treatment plan. Follow up as directed.            Electronically signed by MORALES Hinds CNP on8/23/2019

## 2019-08-23 ASSESSMENT — ENCOUNTER SYMPTOMS: SHORTNESS OF BREATH: 1

## 2019-08-28 ENCOUNTER — OFFICE VISIT (OUTPATIENT)
Dept: FAMILY MEDICINE CLINIC | Age: 84
End: 2019-08-28
Payer: MEDICARE

## 2019-08-28 VITALS
BODY MASS INDEX: 29.54 KG/M2 | DIASTOLIC BLOOD PRESSURE: 82 MMHG | SYSTOLIC BLOOD PRESSURE: 144 MMHG | TEMPERATURE: 80 F | WEIGHT: 169.4 LBS | OXYGEN SATURATION: 93 %

## 2019-08-28 DIAGNOSIS — L03.311 CELLULITIS, ABDOMINAL WALL: ICD-10-CM

## 2019-08-28 DIAGNOSIS — S30.861D INSECT BITE OF ABDOMINAL WALL, SUBSEQUENT ENCOUNTER: ICD-10-CM

## 2019-08-28 DIAGNOSIS — I82.491 ACUTE DEEP VEIN THROMBOSIS (DVT) OF OTHER SPECIFIED VEIN OF RIGHT LOWER EXTREMITY (HCC): Primary | ICD-10-CM
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
## 2019-08-28 PROCEDURE — G8598 ASA/ANTIPLAT THER USED: HCPCS | Performed by: FAMILY MEDICINE

## 2019-08-28 PROCEDURE — 1090F PRES/ABSN URINE INCON ASSESS: CPT | Performed by: FAMILY MEDICINE

## 2019-08-28 PROCEDURE — G8417 CALC BMI ABV UP PARAM F/U: HCPCS | Performed by: FAMILY MEDICINE

## 2019-08-28 PROCEDURE — 1036F TOBACCO NON-USER: CPT | Performed by: FAMILY MEDICINE

## 2019-08-28 PROCEDURE — 4040F PNEUMOC VAC/ADMIN/RCVD: CPT | Performed by: FAMILY MEDICINE

## 2019-08-28 PROCEDURE — 99215 OFFICE O/P EST HI 40 MIN: CPT | Performed by: FAMILY MEDICINE

## 2019-08-28 PROCEDURE — G8400 PT W/DXA NO RESULTS DOC: HCPCS | Performed by: FAMILY MEDICINE

## 2019-08-28 PROCEDURE — G8427 DOCREV CUR MEDS BY ELIG CLIN: HCPCS | Performed by: FAMILY MEDICINE

## 2019-08-28 PROCEDURE — 1123F ACP DISCUSS/DSCN MKR DOCD: CPT | Performed by: FAMILY MEDICINE

## 2019-08-28 RX ORDER — RANITIDINE 150 MG/1
150 TABLET ORAL 2 TIMES DAILY
Qty: 60 TABLET | Refills: 5 | Status: SHIPPED | OUTPATIENT
Start: 2019-08-28 | End: 2019-11-25 | Stop reason: ALTCHOICE

## 2019-08-28 RX ORDER — AMOXICILLIN 500 MG/1
CAPSULE ORAL
Refills: 0 | COMMUNITY
Start: 2019-08-22 | End: 2020-05-06 | Stop reason: ALTCHOICE

## 2019-08-28 RX ORDER — APIXABAN 5 MG/1
TABLET, FILM COATED ORAL
Refills: 0 | COMMUNITY
Start: 2019-08-22 | End: 2020-02-25

## 2019-08-28 NOTE — PROGRESS NOTES
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    Pulmonary embolism ([address]     PVD (peripheral vascular disease) ([facility] [address]     Thyroid nodule     Thyroid nodule     Valvular heart disease       Past Surgical History:   Procedure Laterality Date    BRAIN SURGERY  2010    meningioma    BRAIN SURGERY Left     to remove meningioma    CATARACT REMOVAL Bilateral 2012    COLONOSCOPY  04/2006    with polypectomy    CYST REMOVAL  1996    pancreatic    HYSTERECTOMY      JOINT REPLACEMENT      KNEE ARTHROSCOPY  1999    OTHER SURGICAL HISTORY  2011    injections for bulging discs    STERNUM FRACTURE SURGERY  1996       Family History   Problem Relation Age of Onset    Cancer Mother         skin    Heart Disease Father     High Blood Pressure Father     Stroke Father     Coronary Art Dis Father     Heart Attack Father     Cancer Sister         melanoma    Cancer Brother         skin       Social History     Tobacco Use    Smoking status: Never Smoker    Smokeless tobacco: Never Used   Substance Use Topics    Alcohol use: No      Current Outpatient Medications   Medication Sig Dispense Refill    amoxicillin (AMOXIL) 500 MG capsule take 1 capsule by mouth three times a day START 8-23-19  0    ELIQUIS 5 MG TABS tablet take 2 tablets by mouth twice a day for 1 week START THE MORNING OF 8-23-19  0    ranitidine (ZANTAC) 150 MG tablet Take 1 tablet by mouth 2 times daily 60 tablet 5    apixaban (ELIQUIS) 5 MG TABS tablet Take 1 tablet by mouth 2 times daily 60 tablet 5    apixaban (ELIQUIS) 5 MG TABS tablet Take 1 tablet by mouth 2 times daily 180 tablet 1    ondansetron (ZOFRAN) 4 MG tablet Take 1 tablet by mouth 3 times daily as needed for Nausea or Vomiting 30 tablet 0    methylPREDNISolone (MEDROL) 4 MG tablet Take 1 tablet by mouth daily Take 4 tabs daily for 4 days then 3 tabs daily.  104 tablet 0    timolol (TIMOPTIC) 0.5 % ophthalmic solution Place 1 drop into both

## 2019-09-05 RX ORDER — CARVEDILOL 6.25 MG/1
TABLET ORAL
Qty: 180 TABLET | Refills: 3 | Status: SHIPPED | OUTPATIENT
Start: 2019-09-05 | End: 2020-06-25

## 2019-09-05 RX ORDER — HYDROCHLOROTHIAZIDE 25 MG/1
TABLET ORAL
Qty: 90 TABLET | Refills: 3 | Status: SHIPPED | OUTPATIENT
Start: 2019-09-05 | End: 2020-06-25

## 2019-11-25 ENCOUNTER — TELEPHONE (OUTPATIENT)
Dept: FAMILY MEDICINE CLINIC | Age: 84
End: 2019-11-25

## 2019-11-25 DIAGNOSIS — R11.0 NAUSEA: Primary | ICD-10-CM

## 2019-11-25 RX ORDER — FAMOTIDINE 20 MG/1
20 TABLET, FILM COATED ORAL 2 TIMES DAILY
Qty: 60 TABLET | Refills: 5 | Status: SHIPPED | OUTPATIENT
Start: 2019-11-25 | End: 2019-12-02 | Stop reason: ALTCHOICE

## 2019-12-02 DIAGNOSIS — R11.0 NAUSEA: ICD-10-CM

## 2019-12-02 RX ORDER — ONDANSETRON 4 MG/1
4 TABLET, FILM COATED ORAL 3 TIMES DAILY PRN
Qty: 30 TABLET | Refills: 0 | Status: SHIPPED | OUTPATIENT
Start: 2019-12-02 | End: 2020-05-06 | Stop reason: ALTCHOICE

## 2019-12-02 RX ORDER — NIZATIDINE 150 MG/1
150 CAPSULE ORAL 2 TIMES DAILY
Qty: 60 CAPSULE | Refills: 3 | Status: SHIPPED | OUTPATIENT
Start: 2019-12-02 | End: 2019-12-04 | Stop reason: RX

## 2019-12-04 ENCOUNTER — TELEPHONE (OUTPATIENT)
Dept: FAMILY MEDICINE CLINIC | Age: 84
End: 2019-12-04

## 2019-12-04 DIAGNOSIS — K21.9 GASTROESOPHAGEAL REFLUX DISEASE WITHOUT ESOPHAGITIS: Primary | ICD-10-CM

## 2019-12-04 RX ORDER — FAMOTIDINE 20 MG/1
20 TABLET, FILM COATED ORAL 2 TIMES DAILY
Qty: 60 TABLET | Refills: 5 | COMMUNITY
Start: 2019-12-04 | End: 2020-05-06 | Stop reason: ALTCHOICE

## 2019-12-04 RX ORDER — PANTOPRAZOLE SODIUM 20 MG/1
20 TABLET, DELAYED RELEASE ORAL
Qty: 90 TABLET | Refills: 1 | Status: SHIPPED | OUTPATIENT
Start: 2019-12-04 | End: 2019-12-04

## 2020-02-18 LAB
ANION GAP SERPL CALCULATED.3IONS-SCNC: 7.7 MMOL/L
BUN BLDV-MCNC: 25 MG/DL (ref 7–17)
C-REACTIVE PROTEIN: < 0.5 MG/DL (ref 0–1)
CALCIUM SERPL-MCNC: 10.1 MG/DL (ref 8.4–10.2)
CHLORIDE BLD-SCNC: 102 MMOL/L (ref 98–120)
CO2: 31 MMOL/L (ref 22–31)
CREAT SERPL-MCNC: 0.8 MG/DL (ref 0.5–1)
GFR CALCULATED: > 60
GLUCOSE: 146 MG/DL (ref 65–105)
POTASSIUM SERPL-SCNC: 4.1 MMOL/L (ref 3.6–5)
SEDIMENTATION RATE, ERYTHROCYTE: 20 MM/HR (ref 0–30)
SODIUM BLD-SCNC: 140 MMOL/L (ref 135–145)

## 2020-02-19 LAB
BANDED NEUTROPHILS RELATIVE PERCENT: 0 % (ref 0–5)
BASOPHILS %. MANUAL COUNT: 0 % (ref 0–1)
DIFFERENTIAL COMMENT: NORMAL
EOSINOPHILS % MANUAL COUNT: 0
HCT VFR BLD CALC: 41.4 % (ref 37–47)
HEMOGLOBIN: 13.5 (ref 12–16)
LYMPHOCYTES % MANUAL COUNT: 58 % (ref 21–51)
MCH RBC QN AUTO: 30.8 PG (ref 28.5–32.5)
MCHC RBC AUTO-ENTMCNC: 32.6 G/DL (ref 32–37)
MCV RBC AUTO: 94.7 FL (ref 80–94)
MONOCYTES RELATIVE PERCENT: 2 % (ref 2–9)
NEUTROPHILS %. MANUAL COUNT: 31 % (ref 42–75)
PDW BLD-RTO: 12.9 % (ref 8.5–15.5)
PLATELET # BLD: 206.5 THOU/MM3 (ref 130–400)
RBC: 4.38 M/UL (ref 4.2–5.4)
REACTIVE LYMPHOCYTES: 9 % (ref 0–5)
SMUDGE CELLS: NORMAL
WBC: 24.6 THOU/ML3 (ref 4.8–10.8)

## 2020-02-25 RX ORDER — APIXABAN 5 MG/1
TABLET, FILM COATED ORAL
Qty: 180 TABLET | Refills: 1 | Status: SHIPPED | OUTPATIENT
Start: 2020-02-25 | End: 2020-05-06 | Stop reason: CLARIF

## 2020-02-25 NOTE — TELEPHONE ENCOUNTER
Angel Eugene is requesting a refill on the following medication(s):  Requested Prescriptions     Pending Prescriptions Disp Refills    ELIQUIS 5 MG TABS tablet [Pharmacy Med Name: ELIQUIS 5 MG Tablet] 180 tablet 1     Sig: TAKE 1 TABLET TWICE DAILY       Last Visit Date (If Applicable):  9/45/8559    Next Visit Date:    6/10/2020

## 2020-05-01 ENCOUNTER — TELEPHONE (OUTPATIENT)
Dept: FAMILY MEDICINE CLINIC | Age: 85
End: 2020-05-01

## 2020-05-06 ENCOUNTER — OFFICE VISIT (OUTPATIENT)
Dept: FAMILY MEDICINE CLINIC | Age: 85
End: 2020-05-06
Payer: MEDICARE

## 2020-05-06 ENCOUNTER — HOSPITAL ENCOUNTER (OUTPATIENT)
Age: 85
Setting detail: SPECIMEN
Discharge: HOME | End: 2020-05-06
Payer: MEDICARE

## 2020-05-06 VITALS
DIASTOLIC BLOOD PRESSURE: 76 MMHG | BODY MASS INDEX: 30.34 KG/M2 | WEIGHT: 174 LBS | HEART RATE: 80 BPM | SYSTOLIC BLOOD PRESSURE: 130 MMHG | OXYGEN SATURATION: 96 %

## 2020-05-06 LAB — TSH SERPL DL<=0.05 MIU/L-ACNC: 0.98 MIU/L (ref 0.3–5)

## 2020-05-06 PROCEDURE — 99214 OFFICE O/P EST MOD 30 MIN: CPT | Performed by: FAMILY MEDICINE

## 2020-05-06 PROCEDURE — G8417 CALC BMI ABV UP PARAM F/U: HCPCS | Performed by: FAMILY MEDICINE

## 2020-05-06 PROCEDURE — 36415 COLL VENOUS BLD VENIPUNCTURE: CPT

## 2020-05-06 PROCEDURE — 4040F PNEUMOC VAC/ADMIN/RCVD: CPT | Performed by: FAMILY MEDICINE

## 2020-05-06 PROCEDURE — 1123F ACP DISCUSS/DSCN MKR DOCD: CPT | Performed by: FAMILY MEDICINE

## 2020-05-06 PROCEDURE — 1036F TOBACCO NON-USER: CPT | Performed by: FAMILY MEDICINE

## 2020-05-06 PROCEDURE — G8427 DOCREV CUR MEDS BY ELIG CLIN: HCPCS | Performed by: FAMILY MEDICINE

## 2020-05-06 PROCEDURE — 82607 VITAMIN B-12: CPT

## 2020-05-06 PROCEDURE — 84443 ASSAY THYROID STIM HORMONE: CPT

## 2020-05-06 PROCEDURE — 1090F PRES/ABSN URINE INCON ASSESS: CPT | Performed by: FAMILY MEDICINE

## 2020-05-06 PROCEDURE — G8400 PT W/DXA NO RESULTS DOC: HCPCS | Performed by: FAMILY MEDICINE

## 2020-05-06 PROCEDURE — 99212 OFFICE O/P EST SF 10 MIN: CPT

## 2020-05-06 NOTE — PROGRESS NOTES
stenosis     Meningioma (HCC)     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    Pulmonary embolism (HCC)     PVD [facility]peripheral vascular disease) [facility] [address]     Thyroid nodule     Thyroid nodule     Valvular heart disease       Past Surgical History:   Procedure Laterality Date    BRAIN SURGERY  2010    meningioma    BRAIN SURGERY Left     to remove meningioma    CATARACT REMOVAL Bilateral 2012    COLONOSCOPY  04/2006    with polypectomy    CYST REMOVAL  1996    pancreatic    HYSTERECTOMY      JOINT REPLACEMENT      KNEE ARTHROSCOPY  1999    OTHER SURGICAL HISTORY  2011    injections for bulging discs    STERNUM FRACTURE SURGERY  1996       Family History   Problem Relation Age of Onset    Cancer Mother         skin    Heart Disease Father     High Blood Pressure Father     Stroke Father     Coronary Art Dis Father     Heart Attack Father     Cancer Sister         melanoma    Cancer Brother         skin       Social History     Tobacco Use    Smoking status: Never Smoker    Smokeless tobacco: Never Used   Substance Use Topics    Alcohol use: No      Current Outpatient Medications   Medication Sig Dispense Refill    DULoxetine HCl 20 MG CSDR Take 20 mg by mouth daily 30 capsule 1    hydrochlorothiazide (HYDRODIURIL) 25 MG tablet TAKE 1 TABLET EVERY DAY 90 tablet 3    carvedilol (COREG) 6.25 MG tablet TAKE 1 TABLET TWICE DAILY 180 tablet 3    apixaban (ELIQUIS) 5 MG TABS tablet Take 1 tablet by mouth 2 times daily 180 tablet 1    fluticasone (FLONASE) 50 MCG/ACT nasal spray instill 2 sprays into each nostril once daily 1 Bottle 5    Cholecalciferol (VITAMIN D) 2000 units CAPS capsule Take by mouth daily      LUMIGAN 0.01 % SOLN ophthalmic drops       Cranberry 500 MG CAPS Take 500 mg by mouth 2 times daily 60 capsule 3    aspirin EC 81 MG EC tablet Take 1 tablet by mouth daily 30 tablet 3    timolol (TIMOPTIC) 0.5 % ophthalmic solution Place 1 drop into both eyes

## 2020-05-07 LAB — VITAMIN B-12: 659 PG/ML (ref 232–1245)

## 2020-05-13 RX ORDER — DULOXETIN HYDROCHLORIDE 20 MG/1
20 CAPSULE, DELAYED RELEASE ORAL DAILY
Qty: 30 CAPSULE | Refills: 3 | Status: SHIPPED | OUTPATIENT
Start: 2020-05-13 | End: 2020-07-15

## 2020-06-24 NOTE — TELEPHONE ENCOUNTER
Larry Dixon is requesting a refill on the following medication(s):  Requested Prescriptions     Pending Prescriptions Disp Refills    carvedilol (COREG) 6.25 MG tablet [Pharmacy Med Name: CARVEDILOL 6.25 MG Tablet] 180 tablet 3     Sig: TAKE 1 TABLET TWICE DAILY    hydroCHLOROthiazide (HYDRODIURIL) 25 MG tablet [Pharmacy Med Name: HYDROCHLOROTHIAZIDE 25 MG Tablet] 90 tablet 3     Sig: TAKE 1 TABLET EVERY DAY       Last Visit Date (If Applicable):  4/4/8203    Next Visit Date:    Visit date not found

## 2020-06-25 RX ORDER — HYDROCHLOROTHIAZIDE 25 MG/1
TABLET ORAL
Qty: 90 TABLET | Refills: 3 | Status: SHIPPED | OUTPATIENT
Start: 2020-06-25 | End: 2021-07-06

## 2020-06-25 RX ORDER — CARVEDILOL 6.25 MG/1
TABLET ORAL
Qty: 180 TABLET | Refills: 3 | Status: SHIPPED | OUTPATIENT
Start: 2020-06-25 | End: 2021-07-06

## 2020-07-06 ENCOUNTER — OFFICE VISIT (OUTPATIENT)
Dept: FAMILY MEDICINE CLINIC | Age: 85
End: 2020-07-06
Payer: MEDICARE

## 2020-07-06 VITALS
BODY MASS INDEX: 30.16 KG/M2 | WEIGHT: 173 LBS | HEART RATE: 71 BPM | SYSTOLIC BLOOD PRESSURE: 148 MMHG | OXYGEN SATURATION: 98 % | DIASTOLIC BLOOD PRESSURE: 78 MMHG | RESPIRATION RATE: 12 BRPM

## 2020-07-06 LAB
BILIRUBIN, POC: NORMAL
BLOOD URINE, POC: NORMAL
CLARITY, POC: NORMAL
COLOR, POC: NORMAL
GLUCOSE URINE, POC: NEGATIVE
KETONES, POC: NORMAL
LEUKOCYTE EST, POC: NORMAL
NITRITE, POC: NORMAL
PH, POC: 6.5
PROTEIN, POC: NORMAL
SPECIFIC GRAVITY, POC: 1.01
UROBILINOGEN, POC: 0.2

## 2020-07-06 PROCEDURE — 99213 OFFICE O/P EST LOW 20 MIN: CPT | Performed by: NURSE PRACTITIONER

## 2020-07-06 PROCEDURE — 4040F PNEUMOC VAC/ADMIN/RCVD: CPT | Performed by: NURSE PRACTITIONER

## 2020-07-06 PROCEDURE — 1036F TOBACCO NON-USER: CPT | Performed by: NURSE PRACTITIONER

## 2020-07-06 PROCEDURE — G8400 PT W/DXA NO RESULTS DOC: HCPCS | Performed by: NURSE PRACTITIONER

## 2020-07-06 PROCEDURE — G8417 CALC BMI ABV UP PARAM F/U: HCPCS | Performed by: NURSE PRACTITIONER

## 2020-07-06 PROCEDURE — G8427 DOCREV CUR MEDS BY ELIG CLIN: HCPCS | Performed by: NURSE PRACTITIONER

## 2020-07-06 PROCEDURE — 99212 OFFICE O/P EST SF 10 MIN: CPT

## 2020-07-06 PROCEDURE — 81002 URINALYSIS NONAUTO W/O SCOPE: CPT | Performed by: NURSE PRACTITIONER

## 2020-07-06 PROCEDURE — 1090F PRES/ABSN URINE INCON ASSESS: CPT | Performed by: NURSE PRACTITIONER

## 2020-07-06 PROCEDURE — 1123F ACP DISCUSS/DSCN MKR DOCD: CPT | Performed by: NURSE PRACTITIONER

## 2020-07-06 RX ORDER — PHENAZOPYRIDINE HYDROCHLORIDE 100 MG/1
100 TABLET, FILM COATED ORAL 2 TIMES DAILY PRN
Qty: 30 TABLET | Refills: 0 | Status: SHIPPED | OUTPATIENT
Start: 2020-07-06 | End: 2021-07-06

## 2020-07-06 RX ORDER — METHYLPREDNISOLONE 4 MG/1
TABLET ORAL
COMMUNITY
Start: 2020-05-08 | End: 2020-07-15 | Stop reason: ALTCHOICE

## 2020-07-06 ASSESSMENT — ENCOUNTER SYMPTOMS
VOMITING: 0
NAUSEA: 0

## 2020-07-06 NOTE — PATIENT INSTRUCTIONS
help?   Call your doctor now or seek immediate medical care if:  · You have symptoms of a urinary infection. For example:  ? You have pus in your urine. ? You have pain in your back just below your rib cage. This is called flank pain. ? You have a fever, chills, or body aches. ? It hurts to urinate. ? You have groin or belly pain. · You have more blood in your urine. Watch closely for changes in your health, and be sure to contact your doctor if:  · You have new urination problems. · You do not get better as expected. Where can you learn more? Go to https://MathZeepeLanthio Pharmaeb.Rebls. org and sign in to your Mindflash account. Enter H344 in the Get[employer] box to learn more about \"Blood in the Urine: Care Instructions. \"     If you do not have an account, please click on the \"Sign Up Now\" link. Current as of: August 22, 2019               Content Version: 12.5  © 0019-3111 Healthwise, Incorporated. Care instructions adapted under license by [facility] ([facility]). If you have questions about a medical condition or this instruction, always ask your healthcare professional. [address] any warranty or liability for your use of this information.

## 2020-07-06 NOTE — PROGRESS NOTES
[address] [place] [address] [address] [facility], APRN-[facility]  [address]  Phone:  [phone]  Fax:  [phone]  Bennett Moreno is a 80 y.o. female who presents today for her medical conditions/complaints as noted below. Bennett Moreno c/o of Urinary Frequency (Symptoms come and go over one week, she states she can have pelvic pain along with urinary frequency but they both come and go. No odor or discharge, usually gets these symptoms once yearly.)      HPI:     Urinary Frequency    This is a new problem. The current episode started 1 to 4 weeks ago. The problem occurs intermittently. The problem has been gradually worsening. Quality: URINARY PRESSURE. The patient is experiencing no pain. There has been no fever. Associated symptoms include frequency and urgency. Pertinent negatives include no chills, discharge, flank pain, hematuria, nausea or vomiting. She has tried nothing for the symptoms. There is no history of kidney stones.        Wt Readings from Last 3 Encounters:   07/06/20 173 lb (78.5 kg)   05/06/20 174 lb (78.9 kg)   08/28/19 169 lb 6.4 oz (76.8 kg)       Temp Readings from Last 3 Encounters:   08/28/19 (!) 80 °F (26.7 °C)   08/22/19 99.3 °F (37.4 °C) (Tympanic)   08/12/19 99 °F (37.2 °C) (Tympanic)       BP Readings from Last 3 Encounters:   07/06/20 (!) 148/78   05/06/20 130/76   08/28/19 (!) 144/82       Pulse Readings from Last 3 Encounters:   07/06/20 71   05/06/20 80   08/22/19 82              Past Medical History:   Diagnosis Date    Cancer ([address]     Skin cancer    Chronic lymphatic leukemia (HCC)     Glaucoma     Glaucoma     Hypercholesteremia     Hyperlipidemia     Hypertension     Lumbar spinal stenosis     Meningioma (HCC)     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    Pulmonary embolism ([address]     PVD (peripheral vascular disease) ([address]     Thyroid nodule     Thyroid nodule     Valvular heart disease       Past Surgical History:   Procedure Laterality Date    BRAIN SURGERY  2010    meningioma    BRAIN SURGERY Left     to remove meningioma    CATARACT REMOVAL Bilateral 2012    COLONOSCOPY  04/2006    with polypectomy    CYST REMOVAL  1996    pancreatic    HYSTERECTOMY      JOINT REPLACEMENT      KNEE ARTHROSCOPY  1999    OTHER SURGICAL HISTORY  2011    injections for bulging discs    STERNUM FRACTURE SURGERY  1996     Family History   Problem Relation Age of Onset    Cancer Mother         skin    Heart Disease Father     High Blood Pressure Father     Stroke Father     Coronary Art Dis Father     Heart Attack Father     Cancer Sister         melanoma    Cancer Brother         skin     Social History     Tobacco Use    Smoking status: Never Smoker    Smokeless tobacco: Never Used   Substance Use Topics    Alcohol use: No      Current Outpatient Medications   Medication Sig Dispense Refill    phenazopyridine (PYRIDIUM) 100 MG tablet Take 1 tablet by mouth 2 times daily as needed for Pain 30 tablet 0    carvedilol (COREG) 6.25 MG tablet TAKE 1 TABLET TWICE DAILY 180 tablet 3    hydroCHLOROthiazide (HYDRODIURIL) 25 MG tablet TAKE 1 TABLET EVERY DAY 90 tablet 3    apixaban (ELIQUIS) 5 MG TABS tablet Take 1 tablet by mouth 2 times daily 180 tablet 1    timolol (TIMOPTIC) 0.5 % ophthalmic solution Place 1 drop into both eyes daily  1    fluticasone (FLONASE) 50 MCG/ACT nasal spray instill 2 sprays into each nostril once daily 1 Bottle 5    Misc Natural Products (OSTEO BI-FLEX JOINT SHIELD PO) Take by mouth      Cholecalciferol (VITAMIN D) 2000 units CAPS capsule Take by mouth daily      LUMIGAN 0.01 % SOLN ophthalmic drops       Cranberry 500 MG CAPS Take 500 mg by mouth 2 times daily 60 capsule 3    aspirin EC 81 MG EC tablet Take 1 tablet by mouth daily 30 tablet 3    methylPREDNISolone (MEDROL) 4 MG tablet take 1 and 1/2 tablet by mouth once daily      DULoxetine (CYMBALTA) 20 MG extended release Urology, [place]    phenazopyridine (PYRIDIUM) 100 MG tablet     Results for POC orders placed in visit on 07/06/20   POCT Urinalysis no Micro   Result Value Ref Range    Color, UA pale yellow     Clarity, UA      Glucose, UA POC negative     Bilirubin, UA neg     Ketones, UA neg     Spec Grav, UA 1.015     Blood, UA POC trace intact     pH, UA 6.5     Protein, UA POC neg     Urobilinogen, UA 0.2     Leukocytes, UA neg     Nitrite, UA neg                Plan:    Blood present in urine in May and again today. Will have evaluated by Urology. Drink plenty of water. Avoid carbonated drinks and caffeine. Follow up with Urologist.  They will call you for an appointment in [place]. May take Pyridium for irritation and frequency as directed. Stop medication 3 days before seeing urologist.      Patient Instructions   Drink plenty of water. Avoid carbonated drinks and caffeine. Follow up with Urologist.  They will call you for an appointment in [place]. May take Pyridium for irritation and frequency as directed. Stop medication 3 days before seeing urologist.      Patient Education        Blood in the Urine: Care Instructions  Your Care Instructions     Blood in the urine, or hematuria, may make the urine look red, brown, or pink. There may be blood every time you urinate or just from time to time. You cannot always see blood in the urine, but it will show up in a urine test.  Blood in the urine may be serious. It should always be checked by a doctor. Your doctor may recommend more tests, including an X-ray, a CT scan, or a cystoscopy (which lets a doctor look inside the urethra and bladder). Blood in the urine can be a sign of another problem. Common causes are bladder infections and kidney stones. An injury to your groin or your genital area can also cause bleeding in the urinary tract. Very hard exercise--such as running a marathon--can cause blood in the urine.  Blood in the urine can also be a sign of kidney disease or cancer in the bladder or kidney. Many cases of blood in the urine are caused by a harmless condition that runs in families. This is called benign familial hematuria. It does not need any treatment. Sometimes your urine may look red or brown even though it does not contain blood. For example, not getting enough fluids (dehydration), taking certain medicines, or having a liver problem can change the color of your urine. Eating foods such as beets, rhubarb, or blackberries or foods with red food coloring can make your urine look red or pink. Follow-up care is a key part of your treatment and safety. Be sure to make and go to all appointments, and call your doctor if you are having problems. It's also a good idea to know your test results and keep a list of the medicines you take. When should you call for help? Call your doctor now or seek immediate medical care if:  · You have symptoms of a urinary infection. For example:  ? You have pus in your urine. ? You have pain in your back just below your rib cage. This is called flank pain. ? You have a fever, chills, or body aches. ? It hurts to urinate. ? You have groin or belly pain. · You have more blood in your urine. Watch closely for changes in your health, and be sure to contact your doctor if:  · You have new urination problems. · You do not get better as expected. Where can you learn more? Go to https://OnePINpekalineweb.Spiffy Society. org and sign in to your EncrypTix account. Enter Q876 in the [facility] box to learn more about \"Blood in the Urine: Care Instructions. \"     If you do not have an account, please click on the \"Sign Up Now\" link. Current as of: August 22, 2019               Content Version: 12.5  © 2405-7708 Healthwise, Incorporated. Care instructions adapted under license by [facility]  If you have questions about a medical condition or this instruction, always ask your healthcare professional. Joelle Oates[employer] disclaims any warranty or liability for your use of this information. Patient/Caregiver instructed on use, benefit, and side effects of prescribed medications. All patient/parent/caregiver questions answered. Patient/parent/caregiver voiced understanding. Reviewed health maintenance. Instructed to continue current medications, diet and exercise. Patient agreed with treatment plan. Follow up as directed.            Electronically signed by Damon Stallworth RN on7/6/2020

## 2020-07-15 ENCOUNTER — OFFICE VISIT (OUTPATIENT)
Dept: FAMILY MEDICINE CLINIC | Age: 85
End: 2020-07-15
Payer: MEDICARE

## 2020-07-15 ENCOUNTER — HOSPITAL ENCOUNTER (OUTPATIENT)
Age: 85
Setting detail: SPECIMEN
Discharge: HOME | End: 2020-07-15
Payer: MEDICARE

## 2020-07-15 VITALS
BODY MASS INDEX: 29.99 KG/M2 | WEIGHT: 172 LBS | HEART RATE: 83 BPM | OXYGEN SATURATION: 96 % | SYSTOLIC BLOOD PRESSURE: 152 MMHG | DIASTOLIC BLOOD PRESSURE: 66 MMHG

## 2020-07-15 LAB
-: NORMAL
AMORPHOUS: NORMAL
ANION GAP SERPL CALCULATED.3IONS-SCNC: 12 MMOL/L (ref 9–17)
BACTERIA: NORMAL
BILIRUBIN URINE: NEGATIVE
BUN BLDV-MCNC: 21 MG/DL (ref 8–23)
BUN/CREAT BLD: 29 (ref 9–20)
CALCIUM SERPL-MCNC: 10 MG/DL (ref 8.6–10.4)
CASTS UA: NORMAL /LPF (ref 0–2)
CHLORIDE BLD-SCNC: 102 MMOL/L (ref 98–107)
CO2: 28 MMOL/L (ref 20–31)
COLOR: ABNORMAL
COMMENT UA: ABNORMAL
CREAT SERPL-MCNC: 0.72 MG/DL (ref 0.5–0.9)
CRYSTALS, UA: NORMAL /HPF
EPITHELIAL CELLS UA: NORMAL /HPF (ref 0–5)
GFR AFRICAN AMERICAN: >60 ML/MIN
GFR NON-AFRICAN AMERICAN: >60 ML/MIN
GFR SERPL CREATININE-BSD FRML MDRD: ABNORMAL ML/MIN/{1.73_M2}
GFR SERPL CREATININE-BSD FRML MDRD: ABNORMAL ML/MIN/{1.73_M2}
GLUCOSE BLD-MCNC: 110 MG/DL (ref 70–99)
GLUCOSE URINE: ABNORMAL
KETONES, URINE: NEGATIVE
LEUKOCYTE ESTERASE, URINE: NEGATIVE
MUCUS: NORMAL
NITRITE, URINE: POSITIVE
OTHER OBSERVATIONS UA: NORMAL
PH UA: 7 (ref 5–6)
POTASSIUM SERPL-SCNC: 3.9 MMOL/L (ref 3.7–5.3)
PROTEIN UA: NEGATIVE
RBC UA: NORMAL /HPF (ref 0–4)
RENAL EPITHELIAL, UA: NORMAL /HPF
SEDIMENTATION RATE, ERYTHROCYTE: 12 MM (ref 0–30)
SODIUM BLD-SCNC: 142 MMOL/L (ref 135–144)
SPECIFIC GRAVITY UA: 1.02 (ref 1.01–1.02)
TRICHOMONAS: NORMAL
TURBIDITY: ABNORMAL
URINE HGB: ABNORMAL
UROBILINOGEN, URINE: NORMAL
WBC UA: NORMAL /HPF (ref 0–4)
YEAST: NORMAL

## 2020-07-15 PROCEDURE — 86140 C-REACTIVE PROTEIN: CPT

## 2020-07-15 PROCEDURE — 1036F TOBACCO NON-USER: CPT | Performed by: FAMILY MEDICINE

## 2020-07-15 PROCEDURE — 81001 URINALYSIS AUTO W/SCOPE: CPT

## 2020-07-15 PROCEDURE — G8427 DOCREV CUR MEDS BY ELIG CLIN: HCPCS | Performed by: FAMILY MEDICINE

## 2020-07-15 PROCEDURE — 1123F ACP DISCUSS/DSCN MKR DOCD: CPT | Performed by: FAMILY MEDICINE

## 2020-07-15 PROCEDURE — 1090F PRES/ABSN URINE INCON ASSESS: CPT | Performed by: FAMILY MEDICINE

## 2020-07-15 PROCEDURE — G8400 PT W/DXA NO RESULTS DOC: HCPCS | Performed by: FAMILY MEDICINE

## 2020-07-15 PROCEDURE — 85651 RBC SED RATE NONAUTOMATED: CPT

## 2020-07-15 PROCEDURE — 87086 URINE CULTURE/COLONY COUNT: CPT

## 2020-07-15 PROCEDURE — 36415 COLL VENOUS BLD VENIPUNCTURE: CPT

## 2020-07-15 PROCEDURE — 4040F PNEUMOC VAC/ADMIN/RCVD: CPT | Performed by: FAMILY MEDICINE

## 2020-07-15 PROCEDURE — 99214 OFFICE O/P EST MOD 30 MIN: CPT | Performed by: FAMILY MEDICINE

## 2020-07-15 PROCEDURE — 80048 BASIC METABOLIC PNL TOTAL CA: CPT

## 2020-07-15 PROCEDURE — G8417 CALC BMI ABV UP PARAM F/U: HCPCS | Performed by: FAMILY MEDICINE

## 2020-07-15 PROCEDURE — 85025 COMPLETE CBC W/AUTO DIFF WBC: CPT

## 2020-07-15 ASSESSMENT — PATIENT HEALTH QUESTIONNAIRE - PHQ9
SUM OF ALL RESPONSES TO PHQ QUESTIONS 1-9: 1
SUM OF ALL RESPONSES TO PHQ9 QUESTIONS 1 & 2: 1
1. LITTLE INTEREST OR PLEASURE IN DOING THINGS: 1
SUM OF ALL RESPONSES TO PHQ QUESTIONS 1-9: 1
2. FEELING DOWN, DEPRESSED OR HOPELESS: 0

## 2020-07-15 NOTE — PROGRESS NOTES
mouth daily      LUMIGAN 0.01 % SOLN ophthalmic drops       Cranberry 500 MG CAPS Take 500 mg by mouth 2 times daily 60 capsule 3    aspirin EC 81 MG EC tablet Take 1 tablet by mouth daily 30 tablet 3     No current facility-administered medications for this visit. Allergies   Allergen Reactions    Aggrenox [Aspirin-Dipyridamole Er]      Sever HA    Atorvastatin      Other reaction(s): Muscle cramps    Gabapentin     Prednisone     Vicodin [Hydrocodone-Acetaminophen]        Health Maintenance   Topic Date Due    Annual Wellness Visit (AWV)  05/29/2019    Flu vaccine (1) 09/01/2020    Potassium monitoring  07/15/2021    Creatinine monitoring  07/15/2021    DTaP/Tdap/Td vaccine (2 - Td) 06/05/2028    DEXA (modify frequency per FRAX score)  Completed    Shingles Vaccine  Completed    Pneumococcal 65+ yrs at Risk Vaccine  Completed    Hepatitis A vaccine  Aged Out    Hepatitis B vaccine  Aged Out    Hib vaccine  Aged Out    Meningococcal (ACWY) vaccine  Aged Out       Subjective:      Review of Systems    Objective:     BP (!) 152/66 (Site: Right Upper Arm, Position: Sitting, Cuff Size: Large Adult)   Pulse 83   Wt 172 lb (78 kg)   SpO2 96%   BMI 29.99 kg/m²     Physical Exam  Vitals signs and nursing note reviewed. Constitutional:       General: She is not in acute distress. Appearance: Normal appearance. She is not ill-appearing or diaphoretic. HENT:      Head: Normocephalic. Right Ear: Tympanic membrane and external ear normal.      Left Ear: Tympanic membrane and external ear normal.      Nose: Nose normal.      Mouth/Throat:      Pharynx: Uvula midline. Eyes:      General: No scleral icterus. Right eye: No discharge. Left eye: No discharge. Conjunctiva/sclera: Conjunctivae normal.   Neck:      Musculoskeletal: Normal range of motion and neck supple. Cardiovascular:      Rate and Rhythm: Regular rhythm. Heart sounds: Normal heart sounds. Pulmonary:      Effort: Pulmonary effort is normal. No respiratory distress. Breath sounds: Normal breath sounds. No stridor. No wheezing or rales. Chest:      Chest wall: No tenderness. Abdominal:      General: Bowel sounds are normal.      Palpations: Abdomen is soft. Tenderness: There is no right CVA tenderness or left CVA tenderness. Genitourinary:      Musculoskeletal: Normal range of motion. General: No tenderness. Right lower leg: No edema. Left lower leg: No edema. Skin:     General: Skin is warm and dry. Capillary Refill: Capillary refill takes less than 2 seconds. Neurological:      Mental Status: She is alert and oriented to person, place, and time. Psychiatric:         Speech: Speech normal.         Behavior: Behavior normal.         Thought Content: Thought content normal.         Judgment: Judgment normal.      Comments: Mildly anxious         Assessment/Plan:      Diagnosis Orders   1. Vaginal sore     2. Dysuria  Urinalysis Reflex to Culture    Culture, Urine   3. Generalized anxiety disorder     4. Chronic fatigue       Would recommend that she use a barrier ointment such as either Vaseline or Desitin to the area on the posterior vaginal introitus that is very sore and inflamed. If this is not entirely resolving in the next month or 2 then we would want to recheck this and consider using even Premarin type cream    Push the fluids cut caffeine address and other bladder irritants. Continue her follow-up with hematology. We will send a urine culture today to ensure no minor infection that we are overlooking. Would recommend that Matilde think about restarting the anxiety medication we have discussed.   She did not tolerate the Cymbalta 20 mg but we could try another medication such as Lexapro really does not think that she needs this today although her affect is anxious and her daughter relates that she definitely notices that she is quite anxious

## 2020-07-16 LAB
ABSOLUTE EOS #: 0 K/UL (ref 0–0.4)
ABSOLUTE IMMATURE GRANULOCYTE: 0 K/UL (ref 0–0.3)
ABSOLUTE LYMPH #: 13.18 K/UL (ref 1–4.8)
ABSOLUTE MONO #: 0.43 K/UL (ref 0.1–1.2)
BASOPHILS # BLD: 0 % (ref 0–1)
BASOPHILS ABSOLUTE: 0 K/UL (ref 0–0.2)
C-REACTIVE PROTEIN: 3.6 MG/L (ref 0–5)
DIFFERENTIAL TYPE: ABNORMAL
EOSINOPHILS RELATIVE PERCENT: 0 % (ref 1–7)
HCT VFR BLD CALC: 40 % (ref 36.3–47.1)
HEMOGLOBIN: 12.8 G/DL (ref 11.9–15.1)
IMMATURE GRANULOCYTES: 0 %
LYMPHOCYTES # BLD: 61 % (ref 16–46)
MCH RBC QN AUTO: 30.3 PG (ref 25.2–33.5)
MCHC RBC AUTO-ENTMCNC: 32 G/DL (ref 25.2–33.5)
MCV RBC AUTO: 94.6 FL (ref 82.6–102.9)
MONOCYTES # BLD: 2 % (ref 4–11)
MORPHOLOGY: ABNORMAL
MORPHOLOGY: ABNORMAL
NRBC AUTOMATED: 0 PER 100 WBC
PDW BLD-RTO: 13.7 % (ref 11.8–14.4)
PLATELET # BLD: 209 K/UL (ref 138–453)
PLATELET ESTIMATE: ABNORMAL
PMV BLD AUTO: 11.6 FL (ref 8.1–13.5)
RBC # BLD: 4.23 M/UL (ref 3.95–5.11)
RBC # BLD: ABNORMAL 10*6/UL
SEG NEUTROPHILS: 37 % (ref 43–77)
SEGMENTED NEUTROPHILS ABSOLUTE COUNT: 7.99 K/UL (ref 1.8–7.7)
WBC # BLD: 21.6 K/UL (ref 3.5–11.3)
WBC # BLD: ABNORMAL 10*3/UL

## 2020-07-16 RX ORDER — FLUTICASONE PROPIONATE 50 MCG
SPRAY, SUSPENSION (ML) NASAL
Qty: 1 BOTTLE | Refills: 5 | Status: SHIPPED | OUTPATIENT
Start: 2020-07-16 | End: 2020-11-18 | Stop reason: ALTCHOICE

## 2020-07-16 RX ORDER — METHYLPREDNISOLONE 4 MG/1
TABLET ORAL
Qty: 45 TABLET | Refills: 0
Start: 2020-07-16 | End: 2020-11-18 | Stop reason: ALTCHOICE

## 2020-07-17 LAB
CULTURE: NORMAL
Lab: NORMAL
SPECIMEN DESCRIPTION: NORMAL

## 2020-07-24 ENCOUNTER — OFFICE VISIT (OUTPATIENT)
Dept: FAMILY MEDICINE CLINIC | Age: 85
End: 2020-07-24
Payer: MEDICARE

## 2020-07-24 PROBLEM — I82.409 DEEP VENOUS THROMBOSIS (HCC): Status: ACTIVE | Noted: 2020-07-24

## 2020-07-24 PROCEDURE — G8427 DOCREV CUR MEDS BY ELIG CLIN: HCPCS | Performed by: INTERNAL MEDICINE

## 2020-07-24 PROCEDURE — 4040F PNEUMOC VAC/ADMIN/RCVD: CPT | Performed by: INTERNAL MEDICINE

## 2020-07-24 PROCEDURE — 99214 OFFICE O/P EST MOD 30 MIN: CPT | Performed by: INTERNAL MEDICINE

## 2020-07-24 PROCEDURE — G8417 CALC BMI ABV UP PARAM F/U: HCPCS | Performed by: INTERNAL MEDICINE

## 2020-07-24 PROCEDURE — 1036F TOBACCO NON-USER: CPT | Performed by: INTERNAL MEDICINE

## 2020-07-24 PROCEDURE — 1090F PRES/ABSN URINE INCON ASSESS: CPT | Performed by: INTERNAL MEDICINE

## 2020-07-24 PROCEDURE — 1123F ACP DISCUSS/DSCN MKR DOCD: CPT | Performed by: INTERNAL MEDICINE

## 2020-07-24 PROCEDURE — G8400 PT W/DXA NO RESULTS DOC: HCPCS | Performed by: INTERNAL MEDICINE

## 2020-07-24 RX ORDER — SULFAMETHOXAZOLE AND TRIMETHOPRIM 800; 160 MG/1; MG/1
1 TABLET ORAL 2 TIMES DAILY
Qty: 20 TABLET | Refills: 0 | Status: SHIPPED | OUTPATIENT
Start: 2020-07-24 | End: 2020-08-03

## 2020-07-24 ASSESSMENT — ENCOUNTER SYMPTOMS
SINUS PAIN: 0
CHEST TIGHTNESS: 0
RHINORRHEA: 0
BLOOD IN STOOL: 0
COUGH: 0
SHORTNESS OF BREATH: 0
TROUBLE SWALLOWING: 0
SORE THROAT: 0
DIARRHEA: 0
ABDOMINAL PAIN: 0

## 2020-07-24 NOTE — PROGRESS NOTES
[address]  [address]  Dept: [phone]  Dept Fax: [phone]: [phone]     Visit Date:  7/24/2020    Patient:  Alyson Fontenot  YOB: 1934    HPI:   Alyson Fontenot presents today for No chief complaint on file. Vel Alvarado CALL DUE TO COVID 23      HPI-80year-old female with a history of hypertension is calling regarding worsening UTI-like symptoms. Patient reports having almost more than 2-week history of worsening UTI-like symptoms and this past Monday she started experiencing burning stinging-like sensation during micturition with frequency urgency as well as she noticed blood in the urine with no clots. She denies any fever chills nausea vomiting abdominal suprapubic discomfort or any flank pain. She is under home quarantine as her grandson and their mom and dad just got diagnosed with COVID-19. She is waiting to see a urologist next month given her history of multiple recurrent UTIs. Denies urinary incontinence and/or overactive bladder associated symptoms today. She was also concerned about a tick that she noticed today this morning around her neck area while gardening. She does report having history of Lyme disease and had to take 2 weeks of antibiotics. She denies any rashes. Denies any fever chills headaches achy/myalgias stiff neck or swollen joints or headaches or dizziness fever chills night sweats sleep problems or swollen lymph nodes. She is educated on signs and symptoms to watch out for. She was able to remove the tick successfully. Medications  Prior to Visit Medications    Medication Sig Taking?  Authorizing Provider   fluticasone (FLONASE) 50 MCG/ACT nasal spray instill 2 sprays into each nostril once daily  Anastasiya Yeboah MD   methylPREDNISolone (MEDROL) 4 MG tablet take 1 and 1/2 tablet by mouth once daily  Margot Ritter MD Andrea   phenazopyridine (PYRIDIUM) 100 MG tablet Take 1 tablet by mouth 2 times daily as needed for Pain  MORALES Stapleton NP   carvedilol (COREG) 6.25 MG tablet TAKE 1 TABLET TWICE DAILY  Seamus Gutierrez MD   hydroCHLOROthiazide (HYDRODIURIL) 25 MG tablet TAKE 1 TABLET EVERY DAY  Seamus Gutierrez MD   apixaban (ELIQUIS) 5 MG TABS tablet Take 1 tablet by mouth 2 times daily  Seamus Gutierrez MD   timolol (TIMOPTIC) 0.5 % ophthalmic solution Place 1 drop into both eyes daily  Historical Provider, MD   Cholecalciferol (VITAMIN D) 2000 units CAPS capsule Take by mouth daily  Historical Provider, MD SOTOIGAN 0.01 % SOLN ophthalmic drops   Historical Provider, MD   Cranberry 500 MG CAPS Take 500 mg by mouth 2 times daily  Seamus Gutierrez MD   aspirin EC 81 MG EC tablet Take 1 tablet by mouth daily  Seamus Gutierrez MD        Allergies:  is allergic to aggrenox [aspirin-dipyridamole er]; atorvastatin; gabapentin; prednisone; and vicodin [hydrocodone-acetaminophen]. Past Medical History:   has a past medical history of Cancer ([facility] [address] Chronic lymphatic leukemia ([facility] [address] Glaucoma, Glaucoma, Hypercholesteremia, Hyperlipidemia, Hypertension, Lumbar spinal stenosis, Meningioma ([facility] [address] Pulmonary embolism ([facility] [address] PVD (peripheral vascular disease) ([facility] [address] Thyroid nodule, Thyroid nodule, and Valvular heart disease. Past Surgical History   has a past surgical history that includes joint replacement; Knee arthroscopy (1999); Sternum fracture surgery (1996); cyst removal (1996); Hysterectomy; brain surgery (2010); Cataract removal (Bilateral, 2012); Colonoscopy (04/2006); brain surgery (Left); and other surgical history (2011). Family History  family history includes Cancer in her brother, mother, and sister; Coronary Art Dis in her father; Heart Attack in her father; Heart Disease in her father; High Blood Pressure in her father; Stroke in her father. Social History   reports that she has never smoked.  She has never used smokeless tobacco. She reports that she does not drink alcohol or use drugs. Health Maintenance:    Health Maintenance   Topic Date Due    Annual Wellness Visit (AWV)  05/29/2019    Flu vaccine (1) 09/01/2020    Potassium monitoring  07/15/2021    Creatinine monitoring  07/15/2021    DTaP/Tdap/Td vaccine (2 - Td) 06/05/2028    DEXA (modify frequency per FRAX score)  Completed    Shingles Vaccine  Completed    Pneumococcal 65+ yrs at Risk Vaccine  Completed    Hepatitis A vaccine  Aged Out    Hepatitis B vaccine  Aged Out    Hib vaccine  Aged Out    Meningococcal (ACWY) vaccine  Aged Out       Subjective:      Review of Systems   Constitutional: Negative for fatigue, fever and unexpected weight change. HENT: Negative for ear pain, postnasal drip, rhinorrhea, sinus pain, sore throat and trouble swallowing. Eyes: Negative for visual disturbance. Respiratory: Negative for cough, chest tightness and shortness of breath. Cardiovascular: Negative for chest pain and leg swelling. Gastrointestinal: Negative for abdominal pain, blood in stool and diarrhea. Endocrine: Negative for polyuria. Genitourinary: Positive for dysuria, frequency, hematuria and urgency. Negative for decreased urine volume, difficulty urinating, dyspareunia, flank pain, menstrual problem, pelvic pain, vaginal bleeding, vaginal discharge and vaginal pain. Musculoskeletal: Negative for arthralgias, joint swelling and myalgias. Skin: Negative for rash. Allergic/Immunologic: Negative for environmental allergies. Neurological: Negative for weakness, light-headedness, numbness and headaches. Hematological: Negative for adenopathy. Psychiatric/Behavioral: Negative for behavioral problems and suicidal ideas. The patient is not nervous/anxious. Objective: There were no vitals taken for this visit. Physical Exam        Assessment       Diagnosis Orders   1.  Cystitis  sulfamethoxazole-trimethoprim (BACTRIM DS) 800-160 MG per tablet   2. Hematuria, unspecified type  sulfamethoxazole-trimethoprim (BACTRIM DS) 800-160 MG per tablet   3. Urinary urgency  sulfamethoxazole-trimethoprim (BACTRIM DS) 800-160 MG per tablet   4. Frequency of urination  sulfamethoxazole-trimethoprim (BACTRIM DS) 800-160 MG per tablet         PLAN     Bactrim for UTI/acute cystitis associated symptoms. She is encouraged to drink plenty of water and cranberry juice/.      Educated on tick bite induced rashes and signs and symptoms to watch out for Lyme disease and other tick associated diseases. Rafael Hinson is a 80 y.o. female being evaluated by a Virtual Visit encounter to address concerns as mentioned above. A caregiver was present when appropriate. Due to this being a TeleHealth encounter ([facility]), evaluation of the following organ systems was limited: Vitals/Constitutional/EENT/Resp/CV/GI//MS/Neuro/Skin/Heme-Lymph-Imm. Pursuant to the emergency declaration under the [address] [place] [address], [address] [place] [address] authority and the tabulate and [employer] Act, this Virtual Visit was conducted with patient's (and/or legal guardian's) consent, to reduce the patient's risk of exposure to COVID-19 and provide necessary medical care. The patient (and/or legal guardian) has also been advised to contact this office for worsening conditions or problems, and seek emergency medical treatment and/or call 911 if deemed necessary. Patient identification was verified at the start of the visit: Yes    Total time spent for this encounter: 10 mins    Services were provided through a video synchronous discussion virtually to substitute for in-person clinic visit. Patient and provider were located at their individual homes. --Giovanna Comer MD on 7/24/2020 at 5:34 PM    An electronic signature was used to authenticate this note.       No orders of the defined types were placed in this encounter. No follow-ups on file. Patient given educational materials - see patient instructions. Discussed use, benefit, and side effects of prescribed medications. All patient questions answered. Pt voiced understanding. Reviewed health maintenance.        Electronically signed Marty Gonsales MD on 7/24/2020 at 1:05 PM EDT

## 2020-08-26 ENCOUNTER — OFFICE VISIT (OUTPATIENT)
Dept: UROLOGY | Age: 85
End: 2020-08-26
Payer: MEDICARE

## 2020-08-26 VITALS
SYSTOLIC BLOOD PRESSURE: 122 MMHG | HEIGHT: 63 IN | WEIGHT: 169 LBS | BODY MASS INDEX: 29.95 KG/M2 | OXYGEN SATURATION: 96 % | HEART RATE: 76 BPM | DIASTOLIC BLOOD PRESSURE: 62 MMHG

## 2020-08-26 PROCEDURE — G8417 CALC BMI ABV UP PARAM F/U: HCPCS | Performed by: UROLOGY

## 2020-08-26 PROCEDURE — 1090F PRES/ABSN URINE INCON ASSESS: CPT | Performed by: UROLOGY

## 2020-08-26 PROCEDURE — 4040F PNEUMOC VAC/ADMIN/RCVD: CPT | Performed by: UROLOGY

## 2020-08-26 PROCEDURE — 99204 OFFICE O/P NEW MOD 45 MIN: CPT | Performed by: UROLOGY

## 2020-08-26 PROCEDURE — G8427 DOCREV CUR MEDS BY ELIG CLIN: HCPCS | Performed by: UROLOGY

## 2020-08-26 PROCEDURE — G8400 PT W/DXA NO RESULTS DOC: HCPCS | Performed by: UROLOGY

## 2020-08-26 PROCEDURE — 1123F ACP DISCUSS/DSCN MKR DOCD: CPT | Performed by: UROLOGY

## 2020-08-26 PROCEDURE — 1036F TOBACCO NON-USER: CPT | Performed by: UROLOGY

## 2020-08-26 NOTE — PROGRESS NOTES
Dr. Orestes Graf MD  [facility] Consultation / New Patient Visit    Patient:  Paolo Lilly  YOB: 1934  Date: 8/26/2020  Consult requested from Yolis Lopez MD     HISTORY OF PRESENT ILLNESS:   The patient is a 80 y.o. female who presents today for follow-up for the following problem(s): hematuria  Overall the problem(s) : are worsening. Associated Symptoms: No dysuria, gross hematuria. Pain Severity: Pain Score:   0 - No pain    Today visit:   8/26/20  Hematuria: jacinda Clayton presents with history of microscopic hematuria on repeat UA, and pelvic pain. And then she did develop gross hematuria, which may have been associated with UTI. She states the symptoms resolved at this point after antibiotics. Labs reviewed  Old records reviewed  Problem: worsening  Duration: 2-3 months  Pain: 5/10 - pelvic pain with urination. Imaging reviewed: none    History of gross hematuria: x 1. Asymptomatic    Non smoker  Blood thinning meds - Eliquis for blood clots   history: none   family history: none  Smoking history: none  Environmental exposure: none  Hematuria,  has resolved at this time  Imaging: none    Summary of old records:   (Patient's old records, notes and chart reviewed and summarized above.)    Urinalysis today:  No results found for this visit on 08/26/20.     Last BUN and creatinine:  Lab Results   Component Value Date    BUN 21 07/15/2020     Lab Results   Component Value Date    CREATININE 0.72 07/15/2020       Imaging Reviewed during this Office Visit:   (results were independently reviewed by physician and radiology report verified)    PAST MEDICAL, FAMILY AND SOCIAL HISTORY:  Past Medical History:   Diagnosis Date    Cancer [facility])     Skin cancer    Chronic lymphatic leukemia ([facility] [address]     Glaucoma     Glaucoma     Hypercholesteremia     Hyperlipidemia     Hypertension     Lumbar spinal stenosis     Meningioma ([facility] [address]     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    Pulmonary embolism ([address]     PVD (peripheral vascular disease) ([address]     Thyroid nodule     Thyroid nodule     Valvular heart disease      Past Surgical History:   Procedure Laterality Date    BRAIN SURGERY  2010    meningioma    BRAIN SURGERY Left     to remove meningioma    CATARACT REMOVAL Bilateral 2012    COLONOSCOPY  04/2006    with polypectomy    CYST REMOVAL  1996    pancreatic    HYSTERECTOMY      JOINT REPLACEMENT      KNEE ARTHROSCOPY  1999    OTHER SURGICAL HISTORY  2011    injections for bulging discs    STERNUM FRACTURE SURGERY  1996     Family History   Problem Relation Age of Onset    Cancer Mother         skin    Heart Disease Father     High Blood Pressure Father     Stroke Father     Coronary Art Dis Father     Heart Attack Father     Cancer Sister         melanoma    Cancer Brother         skin     No outpatient medications have been marked as taking for the 8/26/20 encounter (Office Visit) with Russell Powers MD.       Aggrenox [aspirin-dipyridamole er]; Atorvastatin; Gabapentin; Prednisone; and Vicodin [hydrocodone-acetaminophen]  Social History     Tobacco Use   Smoking Status Never Smoker   Smokeless Tobacco Never Used       Social History     Substance and Sexual Activity   Alcohol Use No       REVIEW OF SYSTEMS:  Constitutional: negative  Eyes: negative  Respiratory: negative  Cardiovascular: negative  Gastrointestinal: negative  Genitourinary: negative  Musculoskeletal: negative  Skin: negative   Neurological: negative  Hematological/Lymphatic: negative  Psychological: negative    Physical Exam:    This a 80 y.o. female      Vitals:    08/26/20 0917   BP: 122/62   Pulse: 76   SpO2: 96%     Constitutional: Patient in no acute distress   Neuro: alert and oriented to person place and time.     Psych: Mood and affect normal.  Head: atraumatic normocephalic  Eyes: EOMi  HEENT: neck supple, trachea midline  Lungs: Respiratory effort normal  Cardiovascular:  Normal peripheral pulses  Abdomen: Soft, non-tender, non-distended, No CVA  Bladder: non-tender and not distended. FROMx4, no cyanosis clubbing edema  Skin: warm and dry      Assessment and Plan      1. Gross hematuria           Plan:      No follow-ups on file.   Hematuria guerrier  CTU  Cystoscopy

## 2020-09-15 ENCOUNTER — TELEPHONE (OUTPATIENT)
Dept: UROLOGY | Age: 85
End: 2020-09-15

## 2020-09-15 NOTE — TELEPHONE ENCOUNTER
Patient is scheduled for a CT urogram at [place] [address]. on 9/17/2020. Joana Pandey from radiology called the office this morning for the CT urogram and they also need a creatinine order. Please fax to [phone], they will call the patient to come in early for the lab.

## 2020-09-24 LAB
ANION GAP SERPL CALCULATED.3IONS-SCNC: 8.5 MMOL/L
ANISOCYTOSIS: NORMAL
BANDED NEUTROPHILS RELATIVE PERCENT: 0 % (ref 0–5)
BASOPHILS %. MANUAL COUNT: 0 % (ref 0–1)
BUN BLDV-MCNC: 26 MG/DL (ref 7–17)
C-REACTIVE PROTEIN: 1 MG/DL (ref 0–1)
CALCIUM SERPL-MCNC: 9.9 MG/DL (ref 8.4–10.2)
CHLORIDE BLD-SCNC: 104 MMOL/L (ref 98–120)
CO2: 29 MMOL/L (ref 22–31)
CREAT SERPL-MCNC: 0.8 MG/DL (ref 0.5–1)
EOSINOPHILS % MANUAL COUNT: 1 (ref 0–10)
GFR CALCULATED: > 60
GLUCOSE: 121 MG/DL (ref 65–105)
HCT VFR BLD CALC: 39.6 % (ref 37–47)
HEMOGLOBIN: 13.1 (ref 12–16)
LYMPHOCYTES % MANUAL COUNT: 58 % (ref 21–51)
MCH RBC QN AUTO: 30.9 PG (ref 28.5–32.5)
MCHC RBC AUTO-ENTMCNC: 32.9 G/DL (ref 32–37)
MCV RBC AUTO: 93.8 FL (ref 80–94)
MONOCYTES RELATIVE PERCENT: 4 % (ref 2–9)
NEUTROPHILS %. MANUAL COUNT: 37 % (ref 42–75)
PDW BLD-RTO: 12.5 % (ref 8.5–15.5)
PLATELET # BLD: 206.3 THOU/MM3 (ref 130–400)
POTASSIUM SERPL-SCNC: 4 MMOL/L (ref 3.6–5)
RBC: 4.23 M/UL (ref 4.2–5.4)
SEDIMENTATION RATE, ERYTHROCYTE: 30 MM/HR (ref 0–30)
SMUDGE CELLS: PRESENT
SODIUM BLD-SCNC: 141 MMOL/L (ref 135–145)
WBC: 19.1 THOU/ML3 (ref 4.8–10.8)

## 2020-10-05 ENCOUNTER — TELEPHONE (OUTPATIENT)
Dept: UROLOGY | Age: 85
End: 2020-10-05

## 2020-10-05 NOTE — TELEPHONE ENCOUNTER
Discussed with patient, will have Dr. Kyle Qureshi review results. Discussed that patient will need to schedule cysto (had previously told surgery center that she didn't know if she could tolerate one).

## 2020-10-05 NOTE — TELEPHONE ENCOUNTER
Call patient back today. She states she had a CT Urogram at [facility] and she does not know results or what she is supposed to be doing.   Call back to [phone]

## 2020-11-06 ENCOUNTER — TELEPHONE (OUTPATIENT)
Dept: FAMILY MEDICINE CLINIC | Age: 85
End: 2020-11-06

## 2020-11-06 NOTE — TELEPHONE ENCOUNTER
Patient has sx of CTS, niece is an OT and had recommended Advil but pharmacist told her it is contraindicated. She wonders what she can take? She did not want to schedule an appt at this time. I did suggest wrist splints.

## 2020-11-07 NOTE — TELEPHONE ENCOUNTER
Can use the night splints -- that is fine but I agree that the advil is not appropriate. She can use topical Voltaren gel (OTC) at this time to the wrists-- similar effectiveness to the advil but safe for her to use.

## 2020-11-13 ENCOUNTER — TELEPHONE (OUTPATIENT)
Dept: FAMILY MEDICINE CLINIC | Age: 85
End: 2020-11-13

## 2020-11-13 RX ORDER — ESCITALOPRAM OXALATE 5 MG/1
5 TABLET ORAL DAILY
Qty: 30 TABLET | Refills: 0 | Status: SHIPPED | OUTPATIENT
Start: 2020-11-13 | End: 2020-12-11 | Stop reason: SDUPTHER

## 2020-11-13 NOTE — TELEPHONE ENCOUNTER
Patients daughter called stating deya is crying and upset wondering if you could send in another medication for her depression tried Cymbalta but didn't take the med didn't like how it made her feel. Per [place] A[place] she said we could try her on lexapro 5mg but someone needs to be with her all weekend watching her closely and if her metal state worsens to go to the ER or Full [facility]. Let the daughter know she was resiant to go over there and check on her has other things going on.

## 2020-11-13 NOTE — TELEPHONE ENCOUNTER
Spoke with  patient- she states she is not doing very well right now. She is having a lot of pain in her hand and arms. She is using carpal tunnel splints at night. She uses tylenol prn pain. She is using ice for the pain. She states she does feel depressed and her family thinks that she needs medication for it. She does not have any thoughts of hurting herself. She is willing to try the lexapro. Advised her that we will notify Randa Moran of the new prescription and will let her know she needs to check on her frequently. She is willing to come in for an appointment on Monday or Wednesday afternoon. Daughter notified by phone. Appointment scheduled 11/18 with Dr Maeve Tierney for follow up. Please send lexapro to [facility].

## 2020-11-13 NOTE — TELEPHONE ENCOUNTER
Pts daughter called stating her stomach is upset from the medication (  shes taking wondering if there was something different you could order for her. Daughter said she called her this morning crying because of how upset her stomach was.

## 2020-11-18 ENCOUNTER — OFFICE VISIT (OUTPATIENT)
Dept: FAMILY MEDICINE CLINIC | Age: 85
End: 2020-11-18
Payer: MEDICARE

## 2020-11-18 VITALS
BODY MASS INDEX: 29.58 KG/M2 | SYSTOLIC BLOOD PRESSURE: 139 MMHG | WEIGHT: 167 LBS | DIASTOLIC BLOOD PRESSURE: 79 MMHG | HEART RATE: 74 BPM | OXYGEN SATURATION: 96 %

## 2020-11-18 PROBLEM — I82.409 DEEP VENOUS THROMBOSIS (HCC): Status: RESOLVED | Noted: 2020-07-24 | Resolved: 2020-11-18

## 2020-11-18 PROCEDURE — 99211 OFF/OP EST MAY X REQ PHY/QHP: CPT

## 2020-11-18 PROCEDURE — G8484 FLU IMMUNIZE NO ADMIN: HCPCS | Performed by: FAMILY MEDICINE

## 2020-11-18 PROCEDURE — 1090F PRES/ABSN URINE INCON ASSESS: CPT | Performed by: FAMILY MEDICINE

## 2020-11-18 PROCEDURE — G8431 POS CLIN DEPRES SCRN F/U DOC: HCPCS | Performed by: FAMILY MEDICINE

## 2020-11-18 PROCEDURE — 1036F TOBACCO NON-USER: CPT | Performed by: FAMILY MEDICINE

## 2020-11-18 PROCEDURE — 1123F ACP DISCUSS/DSCN MKR DOCD: CPT | Performed by: FAMILY MEDICINE

## 2020-11-18 PROCEDURE — G8400 PT W/DXA NO RESULTS DOC: HCPCS | Performed by: FAMILY MEDICINE

## 2020-11-18 PROCEDURE — 4040F PNEUMOC VAC/ADMIN/RCVD: CPT | Performed by: FAMILY MEDICINE

## 2020-11-18 PROCEDURE — G8427 DOCREV CUR MEDS BY ELIG CLIN: HCPCS | Performed by: FAMILY MEDICINE

## 2020-11-18 PROCEDURE — 99214 OFFICE O/P EST MOD 30 MIN: CPT | Performed by: FAMILY MEDICINE

## 2020-11-18 PROCEDURE — G8417 CALC BMI ABV UP PARAM F/U: HCPCS | Performed by: FAMILY MEDICINE

## 2020-11-18 RX ORDER — ONDANSETRON 4 MG/1
4 TABLET, FILM COATED ORAL 3 TIMES DAILY PRN
Qty: 30 TABLET | Refills: 0 | Status: SHIPPED | OUTPATIENT
Start: 2020-11-18 | End: 2021-10-11 | Stop reason: ALTCHOICE

## 2020-11-18 ASSESSMENT — PATIENT HEALTH QUESTIONNAIRE - PHQ9
10. IF YOU CHECKED OFF ANY PROBLEMS, HOW DIFFICULT HAVE THESE PROBLEMS MADE IT FOR YOU TO DO YOUR WORK, TAKE CARE OF THINGS AT HOME, OR GET ALONG WITH OTHER PEOPLE: 0
1. LITTLE INTEREST OR PLEASURE IN DOING THINGS: 3
6. FEELING BAD ABOUT YOURSELF - OR THAT YOU ARE A FAILURE OR HAVE LET YOURSELF OR YOUR FAMILY DOWN: 0
4. FEELING TIRED OR HAVING LITTLE ENERGY: 2
8. MOVING OR SPEAKING SO SLOWLY THAT OTHER PEOPLE COULD HAVE NOTICED. OR THE OPPOSITE, BEING SO FIGETY OR RESTLESS THAT YOU HAVE BEEN MOVING AROUND A LOT MORE THAN USUAL: 0
SUM OF ALL RESPONSES TO PHQ QUESTIONS 1-9: 11
SUM OF ALL RESPONSES TO PHQ QUESTIONS 1-9: 11
5. POOR APPETITE OR OVEREATING: 2
SUM OF ALL RESPONSES TO PHQ9 QUESTIONS 1 & 2: 6
7. TROUBLE CONCENTRATING ON THINGS, SUCH AS READING THE NEWSPAPER OR WATCHING TELEVISION: 0
9. THOUGHTS THAT YOU WOULD BE BETTER OFF DEAD, OR OF HURTING YOURSELF: 0
3. TROUBLE FALLING OR STAYING ASLEEP: 1
2. FEELING DOWN, DEPRESSED OR HOPELESS: 3
SUM OF ALL RESPONSES TO PHQ QUESTIONS 1-9: 11

## 2020-11-18 ASSESSMENT — COLUMBIA-SUICIDE SEVERITY RATING SCALE - C-SSRS
2. HAVE YOU ACTUALLY HAD ANY THOUGHTS OF KILLING YOURSELF?: NO
1. WITHIN THE PAST MONTH, HAVE YOU WISHED YOU WERE DEAD OR WISHED YOU COULD GO TO SLEEP AND NOT WAKE UP?: NO
6. HAVE YOU EVER DONE ANYTHING, STARTED TO DO ANYTHING, OR PREPARED TO DO ANYTHING TO END YOUR LIFE?: NO

## 2020-11-18 NOTE — PROGRESS NOTES
[facility]  [address]  Dept: [phone]  Dept GTX:[phone]    Jean Sunshine is a 80 y.o. female who presents today for her medical conditions/complaints as notedbelow. Jean Sunshine is c/o of Discuss Medications (f/u on lexapro); Carpal Tunnel (not sure if its that right hand is worse then left); and Depression (wants to cry all the time, doesnt feel good )      HPI:     HPI    Feels like she wants to cry all the time, just doesn't feel good. Her kids think she has been like this off and on all over the summer. No particular trigger. Has been weaned off of the steroids in October and not sure if this made it worse. Lack of motivation. Doesn't care about doing things. Was weaned off of her medrol which she was taking for polymyalgia rheumatic recently. Her symptoms were present prior to stopping the medication but has increased    Has the carpal tunnel now in both hands  Has the splint. The thumbs are numb and can feel needles in the thumb. The splints have been helping. Has been wearing for a bout 2 weeks. Feels like her hands are weak as well.     BP Readings from Last 3 Encounters:   11/18/20 139/79   08/26/20 122/62   07/15/20 (!) 152/66          (goal 120/80)    Wt Readings from Last 3 Encounters:   11/18/20 167 lb (75.8 kg)   08/26/20 169 lb (76.7 kg)   07/15/20 172 lb (78 kg)        Past Medical History:   Diagnosis Date    Cancer ([address]     Skin cancer    Chronic lymphatic leukemia ([address]     Glaucoma     Glaucoma     Hypercholesteremia     Hyperlipidemia     Hypertension     Lumbar spinal stenosis     Meningioma ([address]     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    Pulmonary embolism ([address]     PVD (peripheral vascular disease) ([address]     Thyroid nodule     Thyroid nodule     Valvular heart disease       Past Surgical History:   Procedure Laterality Date    BRAIN SURGERY  2010 meningioma    BRAIN SURGERY Left     to remove meningioma    CATARACT REMOVAL Bilateral 2012    COLONOSCOPY  04/2006    with polypectomy    CYST REMOVAL  1996    pancreatic    HYSTERECTOMY      JOINT REPLACEMENT      KNEE ARTHROSCOPY  1999    OTHER SURGICAL HISTORY  2011    injections for bulging discs    STERNUM FRACTURE SURGERY  1996       Family History   Problem Relation Age of Onset    Cancer Mother         skin    Heart Disease Father     High Blood Pressure Father     Stroke Father     Coronary Art Dis Father     Heart Attack Father     Cancer Sister         melanoma    Cancer Brother         skin       Social History     Tobacco Use    Smoking status: Never Smoker    Smokeless tobacco: Never Used   Substance Use Topics    Alcohol use: No      Current Outpatient Medications   Medication Sig Dispense Refill    ondansetron (ZOFRAN) 4 MG tablet Take 1 tablet by mouth 3 times daily as needed for Nausea or Vomiting 30 tablet 0    escitalopram (LEXAPRO) 5 MG tablet Take 1 tablet by mouth daily 30 tablet 0    phenazopyridine (PYRIDIUM) 100 MG tablet Take 1 tablet by mouth 2 times daily as needed for Pain 30 tablet 0    carvedilol (COREG) 6.25 MG tablet TAKE 1 TABLET TWICE DAILY 180 tablet 3    hydroCHLOROthiazide (HYDRODIURIL) 25 MG tablet TAKE 1 TABLET EVERY DAY 90 tablet 3    apixaban (ELIQUIS) 5 MG TABS tablet Take 1 tablet by mouth 2 times daily 180 tablet 1    timolol (TIMOPTIC) 0.5 % ophthalmic solution Place 1 drop into both eyes daily  1    Cholecalciferol (VITAMIN D) 2000 units CAPS capsule Take by mouth daily      LUMIGAN 0.01 % SOLN ophthalmic drops       Cranberry 500 MG CAPS Take 500 mg by mouth 2 times daily 60 capsule 3    aspirin EC 81 MG EC tablet Take 1 tablet by mouth daily 30 tablet 3     No current facility-administered medications for this visit.       Allergies   Allergen Reactions    Aggrenox [Aspirin-Dipyridamole Er]      Sever HA    Atorvastatin      Other reaction(s): Muscle cramps    Gabapentin     Prednisone     Vicodin [Hydrocodone-Acetaminophen]        Health Maintenance   Topic Date Due    Annual Wellness Visit (AWV)  05/29/2019    Potassium monitoring  09/24/2021    Creatinine monitoring  09/24/2021    DTaP/Tdap/Td vaccine (2 - Td) 06/05/2028    Flu vaccine  Completed    Shingles Vaccine  Completed    Pneumococcal 65+ yrs at Risk Vaccine  Completed    Hepatitis A vaccine  Aged Out    Hepatitis B vaccine  Aged Out    Hib vaccine  Aged Out    Meningococcal (ACWY) vaccine  Aged Out       Subjective:      Review of Systems    Objective:     /79 (Site: Left Upper Arm, Position: Sitting, Cuff Size: Medium Adult)   Pulse 74   Wt 167 lb (75.8 kg)   SpO2 96%   BMI 29.58 kg/m²     Physical Exam  Vitals signs and nursing note reviewed. Constitutional:       Appearance: Normal appearance. HENT:      Head: Normocephalic. Neck:      Musculoskeletal: Neck supple. Cardiovascular:      Rate and Rhythm: Normal rate and regular rhythm. Heart sounds: Normal heart sounds. Pulmonary:      Effort: Pulmonary effort is normal.      Breath sounds: Normal breath sounds. Musculoskeletal:      Right lower leg: No edema. Left lower leg: No edema. Comments: Positive tinels and phalen's bilaterally. Normal  strength bilaterally   Neurological:      General: No focal deficit present. Mental Status: She is alert and oriented to person, place, and time. Psychiatric:         Mood and Affect: Mood normal.         Behavior: Behavior normal.         Thought Content: Thought content normal.         Judgment: Judgment normal.      Comments: Anxious affect         Assessment/Plan:      Diagnosis Orders   1. Carpal tunnel syndrome of right wrist     2. Positive depression screening  Positive Screen for Clinical Depression with a Documented Follow-up Plan    3. Chronic lymphatic leukemia ([facility]     4. Generalized anxiety disorder     5. Current moderate episode of major depressive disorder without prior episode ([facility]     6. Nausea  ondansetron (ZOFRAN) 4 MG tablet     Will go ahead and continue use the splint on the right. Exercises given and would consider referral to Occupational Therapy if her symptoms are not improving. Continue her follow-up with Dr. Pauline Duffy for her CLL. Did start on the 5 mg of the S-Citalopram which she is tolerating well so far. We will give this about 4 weeks see if this is improving her depressive anxiety symptoms. If not would increase to 10 mg daily. Has had the intermittent nausea for a long time and does occasionally use the ondansetron for this. It is is safe that she tolerates this well we will go ahead and refill this for her today. This does get aggravated she says when she gets more anxious and upset. Check with [facility] in [place] for possible counseling -- possible virtual/ telehealth visit if possible and if not connecting then let me know and we can check into the [facility]. Lab Results   Component Value Date    WBC 19.1 (H) 09/24/2020    HGB 13.1 09/24/2020    HCT 39.6 09/24/2020    .3 09/24/2020    CHOL 237 02/26/2016    TRIG 355 02/26/2016    HDL 35 02/26/2016    ALT 23 05/01/2020    AST 21 05/01/2020     09/24/2020    K 4.0 09/24/2020     09/24/2020    CREATININE 0.8 09/24/2020    BUN 26 (H) 09/24/2020    CO2 29 09/24/2020    TSH 0.98 05/06/2020       Return in about 4 weeks (around 12/16/2020). Patient given educational materials - see patientinstructions. Discussed use, benefit, and side effects of prescribed medications. All patient questions answered. Pt voiced understanding. Reviewed health maintenance. Instructed to continue current medications, diet andexercise. Patient agreed with treatment plan. Follow up as directed.      (Please note that portions of this note were completed with a voice-recognition program. Efforts were made to edit the dictation but occasionally words are mis-transcribed.)    Electronically signed by Gina Harkins MD on 11/22/2020

## 2020-11-18 NOTE — PATIENT INSTRUCTIONS
Check with [facility] in [facility] for possible counseling -- possible virtual/ telehealth visit if possible and if not connecting then let me know and we can check into the [facility] behavioral health. Patient Education        Carpal Tunnel Syndrome: Exercises  Introduction  Here are some examples of exercises for you to try. The exercises may be suggested for a condition or for rehabilitation. Start each exercise slowly. Ease off the exercises if you start to have pain. You will be told when to start these exercises and which ones will work best for you. Warm-up stretches  When you no longer have pain or numbness, you can do exercises to help prevent carpal tunnel syndrome from coming back. Do not do any stretch or movement that is uncomfortable or painful. 1. Rotate your wrist up, down, and from side to side. Repeat 4 times. 2. Stretch your fingers far apart. Relax them, and then stretch them again. Repeat 4 times. 3. Stretch your thumb by pulling it back gently, holding it, and then releasing it. Repeat 4 times. How to do the exercises  Prayer stretch   1. Start with your palms together in front of your chest just below your chin. 2. Slowly lower your hands toward your waistline, keeping your hands close to your stomach and your palms together until you feel a mild to moderate stretch under your forearms. 3. Hold for at least 15 to 30 seconds. Repeat 2 to 4 times. Wrist flexor stretch   1. Extend your arm in front of you with your palm up. 2. Bend your wrist, pointing your hand toward the floor. 3. With your other hand, gently bend your wrist farther until you feel a mild to moderate stretch in your forearm. 4. Hold for at least 15 to 30 seconds. Repeat 2 to 4 times. Wrist extensor stretch   1. Repeat steps 1 through 4 of the stretch above, but begin with your extended hand palm down. Follow-up care is a key part of your treatment and safety.  Be sure to make and go to all appointments, and call your doctor if you are having problems. It's also a good idea to know your test results and keep a list of the medicines you take. Where can you learn more? Go to [email or web address] and sign in to your [employer] account. Enter G652 in the Instant BioScan box to learn more about \"Carpal Tunnel Syndrome: Exercises. \"     If you do not have an account, please click on the \"Sign Up Now\" link. Current as of: March 2, 2020               Content Version: 12.6  © 4594-9153 [employer]. Care instructions adapted under license by [facility] ([facility]). If you have questions about a medical condition or this instruction, always ask your healthcare professional. [address] any warranty or liability for your use of this information.

## 2020-11-20 NOTE — TELEPHONE ENCOUNTER
0730 Patient refused vitals in AM 
 Patient called stating that she has not been feeling well, when asked what symptoms she is having she stated she is having bilateral calf pain. I asked her if it was warm and she stated it does feel warm. Since we don't have a provider here today that she was ok with seeing I suggested she go to the ER. I informed AMM of phone call and she agreed with pt going to the ER.

## 2020-12-02 ENCOUNTER — TELEPHONE (OUTPATIENT)
Dept: FAMILY MEDICINE CLINIC | Age: 85
End: 2020-12-02

## 2020-12-02 ENCOUNTER — HOSPITAL ENCOUNTER (OUTPATIENT)
Age: 85
Setting detail: SPECIMEN
Discharge: HOME | End: 2020-12-02
Payer: MEDICARE

## 2020-12-02 ENCOUNTER — VIRTUAL VISIT (OUTPATIENT)
Dept: FAMILY MEDICINE CLINIC | Age: 85
End: 2020-12-02
Payer: MEDICARE

## 2020-12-02 PROCEDURE — 87186 SC STD MICRODIL/AGAR DIL: CPT

## 2020-12-02 PROCEDURE — 99212 OFFICE O/P EST SF 10 MIN: CPT | Performed by: INTERNAL MEDICINE

## 2020-12-02 PROCEDURE — 81001 URINALYSIS AUTO W/SCOPE: CPT

## 2020-12-02 PROCEDURE — 87088 URINE BACTERIA CULTURE: CPT

## 2020-12-02 PROCEDURE — 87086 URINE CULTURE/COLONY COUNT: CPT

## 2020-12-02 RX ORDER — NITROFURANTOIN 25; 75 MG/1; MG/1
100 CAPSULE ORAL 2 TIMES DAILY
Qty: 14 CAPSULE | Refills: 0 | Status: SHIPPED | OUTPATIENT
Start: 2020-12-02 | End: 2020-12-09

## 2020-12-02 NOTE — PROGRESS NOTES
[address]  [address]  Dept: [phone]  Dept Fax: [phone]  Loc: [phone]     Visit Date:  12/2/2020    Patient:  Deette Leyden  YOB: 1934    HPI:   Deette Leyden presents today for   Chief Complaint   Patient presents with    Urinary Tract Infection     patient is unsure if she has a UTI. she is experiencing urgency and burning   . AUDIO/VIDEO CALL DUE TO COVID 23   HPI 66-year-old female is calling regarding UTI-like symptoms. Patient reports history of frequent UTI almost every 6 months. This morning she woke up with burning sensation as well as suprapubic pressure/discomfort. She has been drinking glasses of water including ice tea. She does report that she has been having some frequency and urgency and she is try to keep herself hydrated. She has been having some low back pain with chills hard to tell though she does have arthritis in her back. She reports for reports like her arthritis flare up in her hands/carpal tunnel related problems and is trying to manage that. No vaginal discharge no vaginal pruritus. At baseline she does have altered bowel habits with constipation been a regular problem for a long time. She is to cut back on her caffeine intake and drink plenty water. Medications  Prior to Visit Medications    Medication Sig Taking?  Authorizing Provider   ondansetron (ZOFRAN) 4 MG tablet Take 1 tablet by mouth 3 times daily as needed for Nausea or Vomiting Yes Iggy Rodriguez MD   escitalopram (LEXAPRO) 5 MG tablet Take 1 tablet by mouth daily Yes Iggy Rodriguez MD   phenazopyridine (PYRIDIUM) 100 MG tablet Take 1 tablet by mouth 2 times daily as needed for Pain Yes MORALES Pulliam - NP   carvedilol (COREG) 6.25 MG tablet TAKE 1 TABLET TWICE DAILY Yes Iggy Rodriguez MD   hydroCHLOROthiazide (HYDRODIURIL) 25 MG tablet TAKE 1 TABLET Rafaela Lugo MD   apixaban (ELIQUIS) 5 MG TABS tablet Take 1 tablet by mouth 2 times daily Yes Beverly Wilkins MD   timolol (TIMOPTIC) 0.5 % ophthalmic solution Place 1 drop into both eyes daily Yes Historical Provider, MD   Cholecalciferol (VITAMIN D) 2000 units CAPS capsule Take by mouth daily Yes Historical Provider, MD   LUMIGAN 0.01 % SOLN ophthalmic drops  Yes Historical Provider, MD   Cranberry 500 MG CAPS Take 500 mg by mouth 2 times daily Yes Beverly Wilkins MD   aspirin EC 81 MG EC tablet Take 1 tablet by mouth daily Yes Beverly Wilkins MD        Allergies:  is allergic to aggrenox [aspirin-dipyridamole er]; atorvastatin; gabapentin; prednisone; and vicodin [hydrocodone-acetaminophen]. Past Medical History:   has a past medical history of Cancer ([facility] Chronic lymphatic leukemia ([facility] Glaucoma, Glaucoma, Hypercholesteremia, Hyperlipidemia, Hypertension, Lumbar spinal stenosis, Meningioma ([facility] Pulmonary embolism ([facility] [address] [facility] PVD (peripheral vascular disease) ([facility] Thyroid nodule, Thyroid nodule, and Valvular heart disease. Past Surgical History   has a past surgical history that includes joint replacement; Knee arthroscopy (1999); Sternum fracture surgery (1996); cyst removal (1996); Hysterectomy; brain surgery (2010); Cataract removal (Bilateral, 2012); Colonoscopy (04/2006); brain surgery (Left); and other surgical history (2011). Family History  family history includes Cancer in her brother, mother, and sister; Coronary Art Dis in her father; Heart Attack in her father; Heart Disease in her father; High Blood Pressure in her father; Stroke in her father. Social History   reports that she has never smoked. She has never used smokeless tobacco. She reports that she does not drink alcohol or use drugs.     Health Maintenance:    Health Maintenance   Topic Date Due    Annual Wellness Visit (AWV)  05/29/2019    Potassium monitoring  09/24/2021    Creatinine monitoring 09/24/2021    DTaP/Tdap/Td vaccine (2 - Td) 06/05/2028    Flu vaccine  Completed    Shingles Vaccine  Completed    Pneumococcal 65+ yrs at Risk Vaccine  Completed    Hepatitis A vaccine  Aged Out    Hepatitis B vaccine  Aged Out    Hib vaccine  Aged Out    Meningococcal (ACWY) vaccine  Aged Out       Subjective:      Review of Systems   Constitutional: Positive for chills. Negative for fatigue, fever and unexpected weight change. HENT: Negative for ear pain, postnasal drip, rhinorrhea, sinus pain, sore throat and trouble swallowing. Eyes: Negative for visual disturbance. Respiratory: Negative for cough, chest tightness and shortness of breath. Cardiovascular: Negative for chest pain and leg swelling. Gastrointestinal: Negative for abdominal pain, blood in stool and diarrhea. Endocrine: Negative for polyuria. Genitourinary: Positive for dysuria, frequency, pelvic pain and urgency. Negative for decreased urine volume, difficulty urinating, dyspareunia, flank pain, genital sores, hematuria, vaginal bleeding, vaginal discharge and vaginal pain. Musculoskeletal: Positive for arthralgias, back pain, joint swelling and myalgias. Negative for gait problem. Skin: Negative for rash. Allergic/Immunologic: Negative for environmental allergies. Neurological: Negative for weakness, light-headedness, numbness and headaches. Hematological: Negative for adenopathy. Psychiatric/Behavioral: Negative for behavioral problems and suicidal ideas. The patient is not nervous/anxious. Objective: There were no vitals taken for this visit. Physical Exam        Assessment       Diagnosis Orders   1. Cystitis  Urinalysis With Microscopic    Culture, Urine    nitrofurantoin, macrocrystal-monohydrate, (MACROBID) 100 MG capsule   2. Frequency of urination  Urinalysis With Microscopic    Culture, Urine    nitrofurantoin, macrocrystal-monohydrate, (MACROBID) 100 MG capsule   3.  Urgency of urination Urinalysis With Microscopic    Culture, Urine    nitrofurantoin, macrocrystal-monohydrate, (MACROBID) 100 MG capsule   4. Suprapubic pressure  Urinalysis With Microscopic    Culture, Urine    nitrofurantoin, macrocrystal-monohydrate, (MACROBID) 100 MG capsule   5. Chills  Urinalysis With Microscopic    Culture, Urine    nitrofurantoin, macrocrystal-monohydrate, (MACROBID) 100 MG capsule         PLAN   Cut back on caffeine/iced tea related drinks. Plenty of water/hydration is the key. Patient wants something less strong of an antibiotic as she usually have \"stomach issues with most of the antibiotics\" Denies any history of C.diff. unless we confirm its an infection. We will empirically treat this with bactrim and can always change or step it up or add based on sensitivities/cultures/Urine analysis. For arthritis of hands- soaking in warm water/arthritis creams like icy hot BenGay [place] balm Aspercreme [place] dream cream emu oil can help sometimes too. For carpal tunnel syndrome associated problems she can try some wrist splints. Prevention for UTI. Here are some things you can try:  1. Make sure you urinate when you need to (every four hours). 2. Stay clean by wiping the right way (front to back) after going to the bathroom and washing your bottom with soap and water every day. 3. Some urologists recommend you stop drinking caffeine and soft drinks. They irritate the urethra (the tube that lets urine leave the body). 4. Change your birth control if you use spermicides, which can cause irritation. 5. Avoid feminine hygiene products and douching: they're irritants too. 6. Ask your doctor about vaginal estrogen if youre past menopause. Although theres no medical evidence the below tips work, they fall into the cant hurt to try category:  1. Drink lots of water - aim for six to eight glasses a day. It may help you flush out the germs and keep them from growing.   2. Take showers instead of baths.  3. Wear cotton underwear (or cotton crotch) and avoid tight fitting pants that hold moisture. 4. Go to the bathroom right before and after youve had sex. Sharath Zuniga is a 80 y.o. female being evaluated by a Virtual Visit (video visit) encounter to address concerns as mentioned above. A caregiver was present when appropriate. Due to this being a TeleHealth encounter (During GYNWJ-00 public health emergency), evaluation of the following organ systems was limited: Vitals/Constitutional/EENT/Resp/CV/GI//MS/Neuro/Skin/Heme-Lymph-Imm. Pursuant to the emergency declaration under the [address] [place] [address] and the [employer] Act, this Virtual Visit was conducted with patient's (and/or legal guardian's) consent, to reduce the patient's risk of exposure to COVID-19 and provide necessary medical care. The patient (and/or legal guardian) has also been advised to contact this office for worsening conditions or problems, and seek emergency medical treatment and/or call 911 if deemed necessary. Patient identification was verified at the start of the visit: Yes    Total time spent for this encounter: 45 mins    Services were provided through a video synchronous discussion virtually to substitute for in-person clinic visit. Patient and provider were located at their individual homes. --Dillon Castaneda MD on 12/7/2020 at 12:32 PM    An electronic signature was used to authenticate this note. Orders Placed This Encounter   Procedures    Culture, Urine     Standing Status:   Future     Standing Expiration Date:   12/2/2021     Order Specific Question:   Specify (ex-cath, midstream, cysto, etc)? Answer:   MID STREAM    Urinalysis With Microscopic     Standing Status:   Future     Standing Expiration Date:   12/2/2021     Order Specific Question:   SPECIFY(EX-CATH,MIDSTREAM,CYSTO,ETC)?      Answer: MID STREAM        No follow-ups on file. Patient given educational materials - see patient instructions. Discussed use, benefit, and side effects of prescribed medications. All patient questions answered. Pt voiced understanding. Reviewed health maintenance.        Electronically signed Barry Winkler MD on 12/2/2020 at 3:09 PM EST

## 2020-12-03 ENCOUNTER — TELEPHONE (OUTPATIENT)
Dept: FAMILY MEDICINE CLINIC | Age: 85
End: 2020-12-03

## 2020-12-03 LAB
-: ABNORMAL
-: ABNORMAL
AMORPHOUS: ABNORMAL
AMORPHOUS: ABNORMAL
BACTERIA: ABNORMAL
BACTERIA: ABNORMAL
BILIRUBIN URINE: NEGATIVE
BILIRUBIN URINE: NEGATIVE
CASTS UA: ABNORMAL /LPF (ref 0–2)
CASTS UA: ABNORMAL /LPF (ref 0–2)
COLOR: ABNORMAL
COLOR: ABNORMAL
COMMENT UA: ABNORMAL
COMMENT UA: ABNORMAL
CRYSTALS, UA: ABNORMAL /HPF
CRYSTALS, UA: ABNORMAL /HPF
EPITHELIAL CELLS UA: ABNORMAL /HPF (ref 0–5)
EPITHELIAL CELLS UA: ABNORMAL /HPF (ref 0–5)
GLUCOSE URINE: NEGATIVE
GLUCOSE URINE: NEGATIVE
KETONES, URINE: NEGATIVE
KETONES, URINE: NEGATIVE
LEUKOCYTE ESTERASE, URINE: ABNORMAL
LEUKOCYTE ESTERASE, URINE: ABNORMAL
MUCUS: ABNORMAL
MUCUS: ABNORMAL
NITRITE, URINE: POSITIVE
NITRITE, URINE: POSITIVE
OTHER OBSERVATIONS UA: ABNORMAL
OTHER OBSERVATIONS UA: ABNORMAL
PH UA: 7 (ref 5–6)
PH UA: 7 (ref 5–6)
PROTEIN UA: NEGATIVE
PROTEIN UA: NEGATIVE
RBC UA: ABNORMAL /HPF (ref 0–4)
RBC UA: ABNORMAL /HPF (ref 0–4)
RENAL EPITHELIAL, UA: ABNORMAL /HPF
RENAL EPITHELIAL, UA: ABNORMAL /HPF
SPECIFIC GRAVITY UA: 1.02 (ref 1.01–1.02)
SPECIFIC GRAVITY UA: 1.02 (ref 1.01–1.02)
TRICHOMONAS: ABNORMAL
TRICHOMONAS: ABNORMAL
TURBIDITY: ABNORMAL
TURBIDITY: ABNORMAL
URINE HGB: ABNORMAL
URINE HGB: ABNORMAL
UROBILINOGEN, URINE: NORMAL
UROBILINOGEN, URINE: NORMAL
WBC UA: >100 /HPF (ref 0–4)
WBC UA: >100 /HPF (ref 0–4)
YEAST: ABNORMAL
YEAST: ABNORMAL

## 2020-12-03 RX ORDER — CEFDINIR 300 MG/1
300 CAPSULE ORAL 2 TIMES DAILY
Qty: 20 CAPSULE | Refills: 0 | Status: SHIPPED | OUTPATIENT
Start: 2020-12-03 | End: 2020-12-13

## 2020-12-03 NOTE — TELEPHONE ENCOUNTER
UA seen from office visit yesterday on Fredy Desir. With the degree of abnormality in the UA and her having chills suggesting a more systemic infection I am concerned that the [address] may not be a systemic and affecting agent for a early pyelonephritis. I would like to add in the TREJO FRED until the culture is back. The antibiotic is sent into the drugstore. Please notify the patient and her family member Lila Simeon.

## 2020-12-04 LAB
CULTURE: ABNORMAL
Lab: ABNORMAL
SPECIMEN DESCRIPTION: ABNORMAL

## 2020-12-07 ASSESSMENT — ENCOUNTER SYMPTOMS
TROUBLE SWALLOWING: 0
DIARRHEA: 0
BLOOD IN STOOL: 0
CHEST TIGHTNESS: 0
SHORTNESS OF BREATH: 0
ABDOMINAL PAIN: 0
RHINORRHEA: 0
BACK PAIN: 1
SINUS PAIN: 0
COUGH: 0
SORE THROAT: 0

## 2020-12-10 ENCOUNTER — TELEPHONE (OUTPATIENT)
Dept: FAMILY MEDICINE CLINIC | Age: 85
End: 2020-12-10

## 2020-12-10 NOTE — TELEPHONE ENCOUNTER
Pt called stating she is wondering if her lexapro would make her tired, weak and shes having nausea also, or could it be the other med shes on for her bladder infection she has.

## 2020-12-11 RX ORDER — ESCITALOPRAM OXALATE 5 MG/1
5 TABLET ORAL DAILY
Qty: 30 TABLET | Refills: 0 | Status: SHIPPED | OUTPATIENT
Start: 2020-12-11 | End: 2021-01-22 | Stop reason: SDUPTHER

## 2020-12-11 RX ORDER — APIXABAN 5 MG/1
TABLET, FILM COATED ORAL
Qty: 180 TABLET | Refills: 1 | Status: SHIPPED | OUTPATIENT
Start: 2020-12-11 | End: 2021-07-06

## 2020-12-11 NOTE — TELEPHONE ENCOUNTER
Erich Lo is requesting a refill on the following medication(s):  Requested Prescriptions      No prescriptions requested or ordered in this encounter       Last Visit Date (If Applicable):  76/1/5864    Next Visit Date:    Visit date not found

## 2020-12-11 NOTE — TELEPHONE ENCOUNTER
It certainly could be either 1 or in fact the bladder infection self.   If she is not improving after she finishes the antibiotic for a couple of days definitely should have an appointment to further assess

## 2020-12-11 NOTE — TELEPHONE ENCOUNTER
Rudy Curtis is requesting a refill on the following medication(s):  Requested Prescriptions     Pending Prescriptions Disp Refills    ELIQUIS 5 MG TABS tablet [Pharmacy Med Name: ELIQUIS 5 MG Tablet] 180 tablet 1     Sig: TAKE 1 TABLET TWICE DAILY       Last Visit Date (If Applicable):  81/9/8552    Next Visit Date:    Visit date not found

## 2020-12-11 NOTE — TELEPHONE ENCOUNTER
Called pt to check on her, she states she is \"so, so\", advised her about the meds and her bladder infection and she agreed to call if this does not help after her antibiotics if not better she would like a telephone appt she doesn't think she could come in very weak and tired.

## 2020-12-17 ENCOUNTER — TELEPHONE (OUTPATIENT)
Dept: FAMILY MEDICINE CLINIC | Age: 85
End: 2020-12-17

## 2020-12-18 NOTE — TELEPHONE ENCOUNTER
Talked to her today scheduled her for January, shes wondering if lexapro could make her feel weak, and tired?

## 2021-01-15 ENCOUNTER — TELEPHONE (OUTPATIENT)
Dept: UROLOGY | Age: 86
End: 2021-01-15

## 2021-01-15 NOTE — TELEPHONE ENCOUNTER
Called to speak with patient regarding cystoscopy that Dr. Carissa Garnica had wanted her to complete. Patient states she has not been feeling well and did not want to complete at this time. States she will just see her PCP for urology concerns at this time. Informed patient to give our office a call when she was ready to schedule an appointment.

## 2021-01-22 RX ORDER — ESCITALOPRAM OXALATE 5 MG/1
2.5 TABLET ORAL DAILY
Qty: 30 TABLET | Refills: 5 | Status: SHIPPED | OUTPATIENT
Start: 2021-01-22 | End: 2022-10-17

## 2021-01-22 NOTE — TELEPHONE ENCOUNTER
Tremaine Cummings is requesting a refill on the following medication(s):  Requested Prescriptions     Pending Prescriptions Disp Refills    escitalopram (LEXAPRO) 5 MG tablet 30 tablet 1     Sig: Take 0.5 tablets by mouth daily       Last Visit Date (If Applicable):  20/9/7298    Next Visit Date:    Visit date not found

## 2021-01-28 LAB
BANDED NEUTROPHILS RELATIVE PERCENT: 0 % (ref 0–5)
BASOPHILS %. MANUAL COUNT: 0 % (ref 0–1)
EOSINOPHILS % MANUAL COUNT: 1 (ref 0–10)
HCT VFR BLD CALC: 38.3 % (ref 37–47)
HEMOGLOBIN: 12.2 (ref 12–16)
LYMPHOCYTES % MANUAL COUNT: 53 % (ref 21–51)
MCH RBC QN AUTO: 28.2 PG (ref 28.5–32.5)
MCHC RBC AUTO-ENTMCNC: 31.9 G/DL (ref 32–37)
MCV RBC AUTO: 88.5 FL (ref 80–94)
MONOCYTES RELATIVE PERCENT: 3 % (ref 2–9)
NEUTROPHILS %. MANUAL COUNT: 44 % (ref 42–75)
PDW BLD-RTO: 13.6 % (ref 8.5–15.5)
PLATELET # BLD: 243.4 THOU/MM3 (ref 130–400)
RBC: 4.33 M/UL (ref 4.2–5.4)
WBC: 14.8 THOU/ML3 (ref 4.8–10.8)

## 2021-07-03 DIAGNOSIS — I82.491 ACUTE DEEP VEIN THROMBOSIS (DVT) OF OTHER SPECIFIED VEIN OF RIGHT LOWER EXTREMITY (HCC): ICD-10-CM

## 2021-07-06 RX ORDER — APIXABAN 5 MG/1
TABLET, FILM COATED ORAL
Qty: 180 TABLET | Refills: 0 | Status: SHIPPED | OUTPATIENT
Start: 2021-07-06 | End: 2021-10-11 | Stop reason: SDUPTHER

## 2021-07-06 RX ORDER — HYDROCHLOROTHIAZIDE 25 MG/1
TABLET ORAL
Qty: 90 TABLET | Refills: 0 | Status: SHIPPED | OUTPATIENT
Start: 2021-07-06 | End: 2021-10-11 | Stop reason: SDUPTHER

## 2021-07-06 RX ORDER — CARVEDILOL 6.25 MG/1
TABLET ORAL
Qty: 180 TABLET | Refills: 0 | Status: SHIPPED | OUTPATIENT
Start: 2021-07-06 | End: 2021-10-11 | Stop reason: SDUPTHER

## 2021-08-24 LAB
BANDED NEUTROPHILS RELATIVE PERCENT: 0 % (ref 0–5)
BASOPHILS %. MANUAL COUNT: 0 % (ref 0–1)
EOSINOPHILS % MANUAL COUNT: 2 (ref 0–10)
HCT VFR BLD CALC: 38 % (ref 37–47)
HEMOGLOBIN: 13 (ref 12–16)
IGG: 962 MG/DL (ref 700–1600)
LYMPHOCYTES % MANUAL COUNT: 71 % (ref 21–51)
MCH RBC QN AUTO: 30.3 PG (ref 28.5–32.5)
MCHC RBC AUTO-ENTMCNC: 34.1 G/DL (ref 32–37)
MCV RBC AUTO: 88.8 FL (ref 80–94)
MONOCYTES RELATIVE PERCENT: 2 % (ref 2–9)
NEUTROPHILS %. MANUAL COUNT: 25 % (ref 42–75)
PDW BLD-RTO: 12.8 % (ref 8.5–15.5)
PLATELET # BLD: 167.7 THOU/MM3 (ref 130–400)
RBC: 4.28 M/UL (ref 4.2–5.4)
WBC: 15.1 THOU/ML3 (ref 4.8–10.8)

## 2021-10-11 ENCOUNTER — OFFICE VISIT (OUTPATIENT)
Dept: FAMILY MEDICINE CLINIC | Age: 86
End: 2021-10-11
Payer: MEDICARE

## 2021-10-11 VITALS
SYSTOLIC BLOOD PRESSURE: 130 MMHG | BODY MASS INDEX: 30.48 KG/M2 | RESPIRATION RATE: 20 BRPM | OXYGEN SATURATION: 96 % | HEART RATE: 79 BPM | HEIGHT: 63 IN | WEIGHT: 172 LBS | DIASTOLIC BLOOD PRESSURE: 80 MMHG

## 2021-10-11 DIAGNOSIS — Z00.00 ROUTINE GENERAL MEDICAL EXAMINATION AT A HEALTH CARE FACILITY: Primary | ICD-10-CM

## 2021-10-11 DIAGNOSIS — K13.0 LIP LESION: ICD-10-CM

## 2021-10-11 DIAGNOSIS — I10 PRIMARY HYPERTENSION: ICD-10-CM

## 2021-10-11 DIAGNOSIS — E78.2 MIXED HYPERLIPIDEMIA: ICD-10-CM

## 2021-10-11 DIAGNOSIS — C91.10 CHRONIC LYMPHATIC LEUKEMIA (HCC): ICD-10-CM

## 2021-10-11 DIAGNOSIS — I82.491 ACUTE DEEP VEIN THROMBOSIS (DVT) OF OTHER SPECIFIED VEIN OF RIGHT LOWER EXTREMITY (HCC): ICD-10-CM

## 2021-10-11 DIAGNOSIS — M54.16 RADICULOPATHY, LUMBAR REGION: ICD-10-CM

## 2021-10-11 LAB
ALBUMIN/GLOBULIN RATIO: 1.4 G/DL
ALBUMIN: 4.3 G/DL (ref 3.5–5)
ALP BLD-CCNC: 97 UNITS/L (ref 38–126)
ALT SERPL-CCNC: 23 UNITS/L (ref 4–35)
ANION GAP SERPL CALCULATED.3IONS-SCNC: 8.6 MMOL/L
AST SERPL-CCNC: 33 UNITS/L (ref 14–36)
BILIRUB SERPL-MCNC: 0.6 MG/DL (ref 0.2–1.3)
BUN BLDV-MCNC: 32 MG/DL (ref 7–17)
CALCIUM SERPL-MCNC: 9.7 MG/DL (ref 8.4–10.2)
CHLORIDE BLD-SCNC: 105 MMOL/L (ref 98–120)
CO2: 28 MMOL/L (ref 22–31)
CREAT SERPL-MCNC: 0.8 MG/DL (ref 0.5–1)
GFR CALCULATED: > 60
GLOBULIN: 3.1 G/DL
GLUCOSE: 105 MG/DL (ref 65–105)
POTASSIUM SERPL-SCNC: 3.7 MMOL/L (ref 3.6–5)
SODIUM BLD-SCNC: 141 MMOL/L (ref 135–145)
TOTAL PROTEIN, SERUM: 7.3 G/DL (ref 6.3–8.2)

## 2021-10-11 PROCEDURE — 1036F TOBACCO NON-USER: CPT | Performed by: FAMILY MEDICINE

## 2021-10-11 PROCEDURE — 1090F PRES/ABSN URINE INCON ASSESS: CPT | Performed by: FAMILY MEDICINE

## 2021-10-11 PROCEDURE — G8427 DOCREV CUR MEDS BY ELIG CLIN: HCPCS | Performed by: FAMILY MEDICINE

## 2021-10-11 PROCEDURE — 4040F PNEUMOC VAC/ADMIN/RCVD: CPT | Performed by: FAMILY MEDICINE

## 2021-10-11 PROCEDURE — G8417 CALC BMI ABV UP PARAM F/U: HCPCS | Performed by: FAMILY MEDICINE

## 2021-10-11 PROCEDURE — G8484 FLU IMMUNIZE NO ADMIN: HCPCS | Performed by: FAMILY MEDICINE

## 2021-10-11 PROCEDURE — 1123F ACP DISCUSS/DSCN MKR DOCD: CPT | Performed by: FAMILY MEDICINE

## 2021-10-11 PROCEDURE — G0008 ADMIN INFLUENZA VIRUS VAC: HCPCS | Performed by: FAMILY MEDICINE

## 2021-10-11 PROCEDURE — G0439 PPPS, SUBSEQ VISIT: HCPCS | Performed by: FAMILY MEDICINE

## 2021-10-11 PROCEDURE — 99214 OFFICE O/P EST MOD 30 MIN: CPT | Performed by: FAMILY MEDICINE

## 2021-10-11 PROCEDURE — PBSHW INFLUENZA, QUADV, ADJUVANTED, 65 YRS +, IM, PF, PREFILL SYR, 0.5ML (FLUAD): Performed by: FAMILY MEDICINE

## 2021-10-11 RX ORDER — CARVEDILOL 6.25 MG/1
TABLET ORAL
Qty: 180 TABLET | Refills: 3 | Status: SHIPPED | OUTPATIENT
Start: 2021-10-11 | End: 2022-10-18

## 2021-10-11 RX ORDER — HYDROCHLOROTHIAZIDE 25 MG/1
TABLET ORAL
Qty: 90 TABLET | Refills: 3 | Status: SHIPPED | OUTPATIENT
Start: 2021-10-11 | End: 2022-10-18

## 2021-10-11 SDOH — ECONOMIC STABILITY: FOOD INSECURITY: WITHIN THE PAST 12 MONTHS, YOU WORRIED THAT YOUR FOOD WOULD RUN OUT BEFORE YOU GOT MONEY TO BUY MORE.: NEVER TRUE

## 2021-10-11 SDOH — ECONOMIC STABILITY: FOOD INSECURITY: WITHIN THE PAST 12 MONTHS, THE FOOD YOU BOUGHT JUST DIDN'T LAST AND YOU DIDN'T HAVE MONEY TO GET MORE.: NEVER TRUE

## 2021-10-11 ASSESSMENT — LIFESTYLE VARIABLES: HOW OFTEN DO YOU HAVE A DRINK CONTAINING ALCOHOL: 0

## 2021-10-11 ASSESSMENT — PATIENT HEALTH QUESTIONNAIRE - PHQ9
SUM OF ALL RESPONSES TO PHQ9 QUESTIONS 1 & 2: 0
SUM OF ALL RESPONSES TO PHQ QUESTIONS 1-9: 0
SUM OF ALL RESPONSES TO PHQ QUESTIONS 1-9: 0
1. LITTLE INTEREST OR PLEASURE IN DOING THINGS: 0
2. FEELING DOWN, DEPRESSED OR HOPELESS: 0
SUM OF ALL RESPONSES TO PHQ QUESTIONS 1-9: 0

## 2021-10-11 ASSESSMENT — SOCIAL DETERMINANTS OF HEALTH (SDOH): HOW HARD IS IT FOR YOU TO PAY FOR THE VERY BASICS LIKE FOOD, HOUSING, MEDICAL CARE, AND HEATING?: NOT HARD AT ALL

## 2021-10-11 NOTE — PATIENT INSTRUCTIONS
See how you do without the escitalopram (the medication for the mood/ anxiety) but if you have any trouble in the winter since you cannot get outside and enjoy the sunshine then call and we can restart with a new prescription to [facility] for Debra De La Cruz - 10/11/2021  Medicare offers a range of preventive health benefits. Some of the tests and screenings are paid in full while other may be subject to a deductible, co-insurance, and/or copay. Some of these benefits include a comprehensive review of your medical history including lifestyle, illnesses that may run in your family, and various assessments and screenings as appropriate. After reviewing your medical record and screening and assessments performed today your provider may have ordered immunizations, labs, imaging, and/or referrals for you. A list of these orders (if applicable) as well as your Preventive Care list are included within your After Visit Summary for your review. Other Preventive Recommendations:    · A preventive eye exam performed by an eye specialist is recommended every 1-2 years to screen for glaucoma; cataracts, macular degeneration, and other eye disorders. · A preventive dental visit is recommended every 6 months. · Try to get at least 150 minutes of exercise per week or 10,000 steps per day on a pedometer . · Order or download the FREE \"Exercise & Physical Activity: Your Everyday Guide\" from [employer]. Call 6-410.112.6101 or search [employer] online. · You need 6908-3065 mg of calcium and 5915-1909 IU of vitamin D per day. It is possible to meet your calcium requirement with diet alone, but a vitamin D supplement is usually necessary to meet this goal.  · When exposed to the sun, use a sunscreen that protects against both UVA and UVB radiation with an SPF of 30 or greater.  Reapply every 2 to 3 hours or after sweating, drying off with a towel, or swimming. · Always wear a seat belt when traveling in a car. Always wear a helmet when riding a bicycle or motorcycle.

## 2021-10-11 NOTE — PROGRESS NOTES
Medicare Annual Wellness Visit  Name: Catherine Paige Date: 10/17/2021   MRN: [id number] Sex: Female   Age: 80 y.o. Ethnicity: Non- / Non    : 1934 Race: White (non-)      Calvin Cui is here for Medicare AWV and Discuss Medications (decrease eliquis )    Screenings for behavioral, psychosocial and functional/safety risks, and cognitive dysfunction are all negative except as indicated below. These results, as well as other patient data from the [address] form, are documented in Flowsheets linked to this Encounter. Does get tired easily. Hs been outside and doing things a lot. Does still get out some in the winter. Still has some pain in her ankles-- otherwise her pain is so much better than it was. She is being treated by the rheumatologist and this is helped significantly. Mood has been really good at this time with the escitalopram. Has not taken since the weather got better and she was outside and busy in  but did help in the winter. Has been on the eliquis for stroke prevention with her atrial fibrillation. No heart racing or pounding at all. She is wondering if she could cut the dose down at all. She is not having side effects no signs of bleeding no excessive bruising. Just was hoping to decrease her overall medications. Allergies   Allergen Reactions    Aggrenox [Aspirin-Dipyridamole Er]      Sever HA    Atorvastatin      Other reaction(s): Muscle cramps    Gabapentin     Prednisone     Vicodin [Hydrocodone-Acetaminophen]          Prior to Visit Medications    Medication Sig Taking?  Authorizing Provider   Handicap Placard MISC by Does not apply route Duration 5 years expires 10/11/2026 Yes Celina Siddiqui MD   apixaban (ELIQUIS) 5 MG TABS tablet TAKE 1 TABLET TWICE DAILY Yes Celina Siddiqui MD   hydroCHLOROthiazide (HYDRODIURIL) 25 MG tablet TAKE 1 TABLET EVERY DAY Yes Celina Siddiqui MD   carvedilol (COREG) 6.25 MG tablet TAKE MD Andrea [place] [facility] [facility]  Sherman Dubois MD as Consulting Physician (Internal Medicine Cardiovascular Disease)  Ting Ward MD as Consulting Physician (Hematology and Oncology)    [facility] Readings from Last 3 Encounters:   10/11/21 172 lb (78 kg)   11/18/20 167 lb (75.8 kg)   08/26/20 169 lb (76.7 kg)     Vitals:    10/11/21 1418   BP: 130/80   Site: Left Upper Arm   Position: Sitting   Cuff Size: Large Adult   Pulse: 79   Resp: 20   SpO2: 96%   Weight: 172 lb (78 kg)   Height: 5' 3\" (1.6 m)     Body mass index is 30.47 kg/m². Based upon direct observation of the patient, evaluation of cognition reveals recent and remote memory intact. Physical Exam  Vitals and nursing note reviewed. Constitutional:       General: She is not in acute distress. Appearance: Normal appearance. She is not ill-appearing or diaphoretic. HENT:      Head: Normocephalic. Right Ear: Tympanic membrane and external ear normal.      Left Ear: Tympanic membrane and external ear normal.      Nose: Nose normal.      Mouth/Throat:      Pharynx: Uvula midline. Eyes:      General: No scleral icterus. Right eye: No discharge. Left eye: No discharge. Conjunctiva/sclera: Conjunctivae normal.   Cardiovascular:      Rate and Rhythm: Normal rate and regular rhythm. Heart sounds: Normal heart sounds. Pulmonary:      Effort: Pulmonary effort is normal. No respiratory distress. Breath sounds: Normal breath sounds. No stridor. No wheezing or rales. Chest:      Chest wall: No tenderness. Abdominal:      General: Bowel sounds are normal.      Palpations: Abdomen is soft. Musculoskeletal:         General: No tenderness. Normal range of motion. Cervical back: Normal range of motion and neck supple. Right lower leg: No edema. Left lower leg: No edema. Skin:     General: Skin is warm and dry. Capillary Refill: Capillary refill takes less than 2 seconds. Neurological:      Mental Status: She is alert and oriented to person, place, and time. Psychiatric:         Speech: Speech normal.         Behavior: Behavior normal.         Thought Content: Thought content normal.         Judgment: Judgment normal.      Comments: Mildly anxious       1. Routine general medical examination at a health care facility  2. Primary hypertension  3. Mixed hyperlipidemia  4. Chronic lymphatic leukemia (HCC)  5. Radiculopathy, lumbar region  -     Handicap Placard MISC; Starting Mon 10/11/2021, Disp-1 each, R-0, PrintDuration 5 years expires 10/11/2026  6. Acute deep vein thrombosis (DVT) of other specified vein of right lower extremity (HCC)  -     apixaban (ELIQUIS) 5 MG TABS tablet; TAKE 1 TABLET TWICE DAILY, Disp-180 tablet, R-3Normal  7. Lip lesion  -     Amb External Referral To Dermatology  HTN is well controlled at this time, hyperlipidemia is asymptomatic well-controlled at this time as well. CLL is being followed by oncology and is asymptomatic. Her arthritic complaints in her back pain is asymptomatic at this time we will continue to follow-up with rheumatology. Lip lesion could be an early squamous cell or an actinic. Will refer to dermatology for further evaluation    Patient's complete Health Risk Assessment and screening values have been reviewed and are found in Flowsheets. The following problems were reviewed today and where indicated follow up appointments were made and/or referrals ordered. Positive Risk Factor Screenings with Interventions:            General Health and ACP:  General  In general, how would you say your health is?: Good  In the past 7 days, have you experienced any of the following?  New or Increased Pain, New or Increased Fatigue, Loneliness, Social Isolation, Stress or Anger?: None of These  Do you get the social and emotional support that you need?: Yes  Do you have a Living Will?: Yes  Advance Directives     Power of [employer] Will ACP-Advance Directive ACP-Power of     Not on File Not on File Not on File Not on File      General Health Risk Interventions:  · will bring in a copy of her living will    Health Habits/Nutrition:  Health Habits/Nutrition  Do you exercise for at least 20 minutes 2-3 times per week?: Yes  Have you lost any weight without trying in the past 3 months?: No  Do you eat only one meal per day?: No  Have you seen the dentist within the past year?: Yes  Body mass index: (!) 30.47  Health Habits/Nutrition Interventions:  · Encouraged healthy diet and increased activity.      Safety:  Safety  Do you have working smoke detectors?: Yes  Have all throw rugs been removed or fastened?: (!) No  Do you have non-slip mats or surfaces in all bathtubs/showers?: Yes  Do all of your stairways have a railing or banister?: Yes  Are your doorways, halls and stairs free of clutter?: Yes  Do you always fasten your seatbelt when you are in a car?: Yes  Safety Interventions:  · Home safety tips provided     Personalized Preventive Plan   Current Health Maintenance Status  Immunization History   Administered Date(s) Administered    COVID-19[place] PF, 30mcg/0.3mL 01/28/2021, 02/18/2021    Influenza Vaccine, unspecified formulation 10/18/2016    Influenza Virus Vaccine 10/18/2016    Influenza Whole 10/10/2008, 09/11/2009    Influenza, High Dose (Fluzone 65 yrs and older) 10/07/2014, 10/19/2015, 10/11/2017, 10/11/2018, 10/20/2019    Influenza, High-dose, Vinnie, 65 yrs +, IM (Fluzone) 10/08/2020    Influenza, Quadv, adjuvanted, 65 yrs +, IM, PF (Fluad) 10/11/2021    Influenza, Triv, inactivated, subunit, adjuvanted, IM (Fluad 65 yrs and older) 10/11/2018, 10/20/2019    Pneumococcal Conjugate 13-valent ([id number]) 12/07/2015    Pneumococcal Polysaccharide ([id number]) 04/10/2005    Td vaccine (adult) 04/10/2005    Td, unspecified formulation 04/10/2005    Tdap (Boostrix, Adacel) 06/05/2018    Zoster Live (Zostavax) 10/10/2011    Zoster Recombinant (Shingrix) 06/05/2018, 11/08/2018, 10/21/2019        Health Maintenance   Topic Date Due    Annual Wellness Visit (AWV)  06/03/2020    COVID-19 Vaccine (3 - Pfizer risk 3-dose series) 03/18/2021    Potassium monitoring  10/11/2022    Creatinine monitoring  10/11/2022    DTaP/Tdap/Td vaccine (2 - Td or Tdap) 06/05/2028    Flu vaccine  Completed    Shingles Vaccine  Completed    Pneumococcal 65+ yrs at Risk Vaccine  Completed    Hepatitis A vaccine  Aged Out    Hepatitis B vaccine  Aged Out    Hib vaccine  Aged Out    Meningococcal (ACWY) vaccine  Aged Out     Recommendations for [employer] Due: see orders and patient instructions/AVS.  . Recommended screening schedule for the next 5-10 years is provided to the patient in written form: see Patient Brenda Molina was seen today for medicare awv and discuss medications. Diagnoses and all orders for this visit:    Routine general medical examination at a health care facility    Primary hypertension  -     Cancel: Comprehensive Metabolic Panel;  Future    Mixed hyperlipidemia    Chronic lymphatic leukemia (HCC)    Radiculopathy, lumbar region  -     Handicap Placard MISC; by Does not apply route Duration 5 years expires 10/11/2026    Acute deep vein thrombosis (DVT) of other specified vein of right lower extremity (HCC)  -     apixaban (ELIQUIS) 5 MG TABS tablet; TAKE 1 TABLET TWICE DAILY    Lip lesion  -     Amb External Referral To Dermatology    Other orders  -     INFLUENZA, QUADV, ADJUVANTED, 65 YRS =, IM, PF, PREFILL SYR, 0.5ML (FLUAD)  -     hydroCHLOROthiazide (HYDRODIURIL) 25 MG tablet; TAKE 1 TABLET EVERY DAY  -     carvedilol (COREG) 6.25 MG tablet; TAKE 1 TABLET TWICE DAILY  -     Comprehensive Metabolic Panel

## 2021-12-09 ENCOUNTER — TELEPHONE (OUTPATIENT)
Dept: FAMILY MEDICINE CLINIC | Age: 86
End: 2021-12-09

## 2021-12-09 RX ORDER — FLUTICASONE PROPIONATE 50 MCG
SPRAY, SUSPENSION (ML) NASAL
Qty: 1 EACH | Refills: 5 | Status: SHIPPED | OUTPATIENT
Start: 2021-12-09

## 2021-12-09 NOTE — TELEPHONE ENCOUNTER
Needs Fluticasone 50 mg nasal spray refilled. Didn't use all summer. Now it is  and needs refilled.   Uses [facility]

## 2022-03-09 LAB
ALBUMIN/GLOBULIN RATIO: 1.6 G/DL
ALBUMIN: 4.6 G/DL (ref 3.5–5)
ALP BLD-CCNC: 95 UNITS/L (ref 38–126)
ALT SERPL-CCNC: 38 UNITS/L (ref 4–35)
ANION GAP SERPL CALCULATED.3IONS-SCNC: 8.9 MMOL/L
AST SERPL-CCNC: 37 UNITS/L (ref 14–36)
BANDED NEUTROPHILS RELATIVE PERCENT: 0 % (ref 0–5)
BASOPHILS %. MANUAL COUNT: 0 % (ref 0–1)
BILIRUB SERPL-MCNC: 0.5 MG/DL (ref 0.2–1.3)
BUN BLDV-MCNC: 32 MG/DL (ref 7–17)
CALCIUM SERPL-MCNC: 9.9 MG/DL (ref 8.4–10.2)
CHLORIDE BLD-SCNC: 105 MMOL/L (ref 98–120)
CO2: 28 MMOL/L (ref 22–31)
CREAT SERPL-MCNC: 1.2 MG/DL (ref 0.5–1)
EOSINOPHILS % MANUAL COUNT: 0 (ref 0–10)
GFR CALCULATED: 45.2
GLOBULIN: 2.9 G/DL
GLUCOSE: 115 MG/DL (ref 65–105)
HCT VFR BLD CALC: 42.8 % (ref 37–47)
HEMOGLOBIN: 13.4 (ref 12–16)
LYMPHOCYTES % MANUAL COUNT: 59 % (ref 21–51)
MCH RBC QN AUTO: 30.8 PG (ref 28.5–32.5)
MCHC RBC AUTO-ENTMCNC: 31.3 G/DL (ref 32–37)
MCV RBC AUTO: 98.4 FL (ref 80–94)
MONOCYTES RELATIVE PERCENT: 6 % (ref 2–9)
NEUTROPHILS %. MANUAL COUNT: 59 % (ref 42–75)
PDW BLD-RTO: 14 % (ref 8.5–15.5)
PLATELET # BLD: 184.8 THOU/MM3 (ref 130–400)
POTASSIUM SERPL-SCNC: 4.3 MMOL/L (ref 3.6–5)
RBC: 4.34 M/UL (ref 4.2–5.4)
SMUDGE CELLS: NORMAL
SODIUM BLD-SCNC: 142 MMOL/L (ref 135–145)
TOTAL PROTEIN, SERUM: 7.5 G/DL (ref 6.3–8.2)
WBC: 25.7 THOU/ML3 (ref 4.8–10.8)

## 2022-04-14 ENCOUNTER — OFFICE VISIT (OUTPATIENT)
Dept: FAMILY MEDICINE CLINIC | Age: 87
End: 2022-04-14
Payer: MEDICARE

## 2022-04-14 VITALS
SYSTOLIC BLOOD PRESSURE: 137 MMHG | RESPIRATION RATE: 16 BRPM | OXYGEN SATURATION: 99 % | WEIGHT: 180 LBS | DIASTOLIC BLOOD PRESSURE: 80 MMHG | HEART RATE: 72 BPM | BODY MASS INDEX: 31.89 KG/M2 | TEMPERATURE: 97.9 F

## 2022-04-14 DIAGNOSIS — N18.31 STAGE 3A CHRONIC KIDNEY DISEASE (HCC): ICD-10-CM

## 2022-04-14 DIAGNOSIS — F32.1 CURRENT MODERATE EPISODE OF MAJOR DEPRESSIVE DISORDER WITHOUT PRIOR EPISODE (HCC): ICD-10-CM

## 2022-04-14 DIAGNOSIS — Z86.718 HISTORY OF DVT (DEEP VEIN THROMBOSIS): ICD-10-CM

## 2022-04-14 DIAGNOSIS — E78.2 MIXED HYPERLIPIDEMIA: ICD-10-CM

## 2022-04-14 DIAGNOSIS — I36.1 NONRHEUMATIC TRICUSPID VALVE REGURGITATION: ICD-10-CM

## 2022-04-14 DIAGNOSIS — R94.5 ABNORMAL LIVER FUNCTION: ICD-10-CM

## 2022-04-14 DIAGNOSIS — C91.10 CHRONIC LYMPHATIC LEUKEMIA (HCC): ICD-10-CM

## 2022-04-14 DIAGNOSIS — I10 PRIMARY HYPERTENSION: Primary | ICD-10-CM

## 2022-04-14 PROBLEM — I82.491 ACUTE DEEP VEIN THROMBOSIS (DVT) OF OTHER SPECIFIED VEIN OF RIGHT LOWER EXTREMITY (HCC): Status: RESOLVED | Noted: 2022-04-14 | Resolved: 2022-04-14

## 2022-04-14 PROBLEM — I82.491 ACUTE DEEP VEIN THROMBOSIS (DVT) OF OTHER SPECIFIED VEIN OF RIGHT LOWER EXTREMITY (HCC): Status: ACTIVE | Noted: 2022-04-14

## 2022-04-14 PROCEDURE — 1036F TOBACCO NON-USER: CPT | Performed by: FAMILY MEDICINE

## 2022-04-14 PROCEDURE — 1090F PRES/ABSN URINE INCON ASSESS: CPT | Performed by: FAMILY MEDICINE

## 2022-04-14 PROCEDURE — 1123F ACP DISCUSS/DSCN MKR DOCD: CPT | Performed by: FAMILY MEDICINE

## 2022-04-14 PROCEDURE — G8417 CALC BMI ABV UP PARAM F/U: HCPCS | Performed by: FAMILY MEDICINE

## 2022-04-14 PROCEDURE — G8427 DOCREV CUR MEDS BY ELIG CLIN: HCPCS | Performed by: FAMILY MEDICINE

## 2022-04-14 PROCEDURE — 99214 OFFICE O/P EST MOD 30 MIN: CPT | Performed by: FAMILY MEDICINE

## 2022-04-14 PROCEDURE — 99213 OFFICE O/P EST LOW 20 MIN: CPT | Performed by: FAMILY MEDICINE

## 2022-04-14 PROCEDURE — 4040F PNEUMOC VAC/ADMIN/RCVD: CPT | Performed by: FAMILY MEDICINE

## 2022-04-14 NOTE — PROGRESS NOTES
[facility]  [address]  Dept: [phone]  Dept HCS:[phone]    Judy Salazar is a 80 y.o. female who presents today for her medical conditions/complaints as notedbelow. Judy Salazar is c/o of Follow-up (6 months)      HPI:     HPI    Feels overall pretty good but she is tired. Will work outside but then she will get tired. Did see Dr. Sheela Vazquez and her blood count was up slightly. Gets a bit out breath when she is working but just rests a few minutes and is fine. No dizziness except when she is outside and will be a bit off from the allergies. Did have a CMP done with Dr. Sheela Vazquez. BUN and Cr were mildly elevated. When she took her labs. AST and ALT were also mildly elevated. Has not been taking the lexapro-- did not have any problems with it. Just felt like she did not need it any more. Saw Derm and they did freeze multiple spots. Will have another follow up in June/ July. Has continued to have some problems with her left eye being blurry. Will be going every few weeks. HTN: has been taking her medications regularly and has not had any sx of her HTN- no CP/ no SOB/ no palpitations/ no dizziness. No weakness but is a bit fatigued chronically- no changes. COntinues with her eliquis from her DVT-- has not had any leg swelling. Has not had any CP/ no bleeding. No dizziness. No bleeding from her gums.     BP Readings from Last 3 Encounters:   04/14/22 137/80   10/11/21 130/80   11/18/20 139/79          (goal 120/80)    Wt Readings from Last 3 Encounters:   04/14/22 180 lb (81.6 kg)   10/11/21 172 lb (78 kg)   11/18/20 167 lb (75.8 kg)        Past Medical History:   Diagnosis Date    Cancer ([facility] [address]     Skin cancer    Chronic lymphatic leukemia ([facility] [address]     Glaucoma     Glaucoma     Hypercholesteremia     Hyperlipidemia     Hypertension     Lumbar spinal stenosis     Meningioma 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    Pulmonary embolism ([address]     PVD [facility]peripheral vascular disease) [facility] [address]     Thyroid nodule     Thyroid nodule     Valvular heart disease       Past Surgical History:   Procedure Laterality Date    BRAIN SURGERY  2010    meningioma    BRAIN SURGERY Left     to remove meningioma    CATARACT REMOVAL Bilateral 2012    COLONOSCOPY  04/2006    with polypectomy    CYST REMOVAL  1996    pancreatic    HYSTERECTOMY      JOINT REPLACEMENT      KNEE ARTHROSCOPY  1999    OTHER SURGICAL HISTORY  2011    injections for bulging discs    STERNUM FRACTURE SURGERY  1996       Family History   Problem Relation Age of Onset    Cancer Mother         skin    Heart Disease Father     High Blood Pressure Father     Stroke Father     Coronary Art Dis Father     Heart Attack Father     Cancer Sister         melanoma    Cancer Brother         skin       Social History     Tobacco Use    Smoking status: Never Smoker    Smokeless tobacco: Never Used   Substance Use Topics    Alcohol use: No      Current Outpatient Medications   Medication Sig Dispense Refill    fluticasone (FLONASE) 50 MCG/ACT nasal spray instill 2 sprays into each nostril once daily 1 each 5    apixaban (ELIQUIS) 5 MG TABS tablet TAKE 1 TABLET TWICE DAILY 180 tablet 3    hydroCHLOROthiazide (HYDRODIURIL) 25 MG tablet TAKE 1 TABLET EVERY DAY 90 tablet 3    carvedilol (COREG) 6.25 MG tablet TAKE 1 TABLET TWICE DAILY 180 tablet 3    timolol (TIMOPTIC) 0.5 % ophthalmic solution Place 1 drop into both eyes daily  1    Cholecalciferol (VITAMIN D) 2000 units CAPS capsule Take by mouth daily      LUMIGAN 0.01 % SOLN ophthalmic drops       Cranberry 500 MG CAPS Take 500 mg by mouth 2 times daily 60 capsule 3    aspirin EC 81 MG EC tablet Take 1 tablet by mouth daily 30 tablet 3    Handicap Placard MISC by Does not apply route Duration 5 years expires 10/11/2026 1 each 0    escitalopram (LEXAPRO) 5 MG tablet Take 0.5 tablets by mouth daily (Patient not taking: Reported on 4/14/2022) 30 tablet 5     No current facility-administered medications for this visit. Allergies   Allergen Reactions    Aggrenox [Aspirin-Dipyridamole Er]      Sever HA    Amoxicillin Other (See Comments)    Atorvastatin      Other reaction(s): Muscle cramps    Doxycycline Other (See Comments)    Gabapentin     Guaifenesin Other (See Comments)    Prednisone     Vicodin [Hydrocodone-Acetaminophen]        Health Maintenance   Topic Date Due    Pneumococcal 65+ years Vaccine (3 - PPSV23 or PCV20) 12/07/2016    COVID-19 Vaccine (4 - Booster for Pfizer series) 02/04/2022    Depression Monitoring  10/11/2022    Annual Wellness Visit (AWV)  10/12/2022    Potassium  03/09/2023    Creatinine  03/09/2023    DTaP/Tdap/Td vaccine (2 - Td or Tdap) 06/05/2028    Flu vaccine  Completed    Shingles Vaccine  Completed    Hepatitis A vaccine  Aged Out    Hepatitis B vaccine  Aged Out    Hib vaccine  Aged Out    Meningococcal (ACWY) vaccine  Aged Out       Subjective:      Review of Systems    Objective:     /80 (Site: Left Upper Arm, Position: Sitting, Cuff Size: Medium Adult)   Pulse 72   Temp 97.9 °F (36.6 °C)   Resp 16   Wt 180 lb (81.6 kg)   SpO2 99%   BMI 31.89 kg/m²     Physical Exam  Vitals and nursing note reviewed. Constitutional:       General: She is not in acute distress. Appearance: Normal appearance. She is not ill-appearing or diaphoretic. HENT:      Head: Normocephalic. Right Ear: Tympanic membrane and external ear normal.      Left Ear: Tympanic membrane and external ear normal.      Nose: Nose normal.      Mouth/Throat:      Pharynx: Uvula midline. Eyes:      General: No scleral icterus. Right eye: No discharge. Left eye: No discharge. Conjunctiva/sclera: Conjunctivae normal.   Cardiovascular:      Rate and Rhythm: Normal rate and regular rhythm.       Heart sounds: Normal heart sounds. Pulmonary:      Effort: Pulmonary effort is normal. No respiratory distress. Breath sounds: Normal breath sounds. No stridor. No wheezing or rales. Chest:      Chest wall: No tenderness. Abdominal:      General: Bowel sounds are normal.      Palpations: Abdomen is soft. Musculoskeletal:         General: No tenderness. Normal range of motion. Cervical back: Normal range of motion and neck supple. Right lower leg: No edema. Left lower leg: No edema. Skin:     General: Skin is warm and dry. Capillary Refill: Capillary refill takes less than 2 seconds. Neurological:      Mental Status: She is alert and oriented to person, place, and time. Psychiatric:         Speech: Speech normal.         Behavior: Behavior normal.         Thought Content: Thought content normal.         Judgment: Judgment normal.      Comments: Mildly anxious         Assessment/Plan:     1. Primary hypertension  2. Chronic lymphatic leukemia (HCC)  3. Current moderate episode of major depressive disorder without prior episode ([facility] [address]  4. History of DVT (deep vein thrombosis)  5. Stage 3a chronic kidney disease ([facility] [address]  -     Comprehensive Metabolic Panel; Future  6. Abnormal liver function  -     Comprehensive Metabolic Panel; Future  7. Nonrheumatic tricuspid valve regurgitation  8. Mixed hyperlipidemia    HTN is asx and well controlled. Continue with her current medications. Continue with her follow up CLL with Dr. Bernadette Zhneg. No sx at this time    Continue to stay active for her mood sx consider restarting the SSRI if increased sx. Labs regularly with Dr. Bernadette Zheng and cc here for her renal and liver function monitoring. Lipids have been stable-- yearly labs,  Follow echo if sx of CHF occur-- not indicated at this time.      Lab Results   Component Value Date    WBC 25.7 (HH) 03/09/2022    HGB 13.4 03/09/2022    HCT 42.8 03/09/2022    .8 03/09/2022    CHOL 237 02/26/2016    TRIG 355 02/26/2016 HDL 35 02/26/2016    ALT 38 (H) 03/09/2022    AST 37 (H) 03/09/2022     03/09/2022    K 4.3 03/09/2022     03/09/2022    CREATININE 1.2 (H) 03/09/2022    BUN 32 (H) 03/09/2022    CO2 28 03/09/2022    TSH 0.98 05/06/2020       Return in about 6 months (around 10/14/2022) for AWV, HTN. Multiple labs and other testing may have been ordered which may not be completely evident from the above note due to system interface incompatibilities. Patient given educational materials - see patientinstructions. Discussed use, benefit, and side effects of prescribed medications. All patient questions answered. Pt voiced understanding. Reviewed health maintenance. Instructed to continue current medications, diet andexercise. Patient agreed with treatment plan. Follow up as directed.      (Please note that portions of this note were completed with a voice-recognition program. Efforts were made to edit the dictation but occasionally words are mis-transcribed.)    Electronically signed by Davian Eddy MD on 4/24/2022

## 2022-04-24 PROBLEM — N18.30 CHRONIC RENAL DISEASE, STAGE III (HCC): Status: ACTIVE | Noted: 2022-04-24

## 2022-04-25 LAB
ALBUMIN/GLOBULIN RATIO: 1.3 G/DL
ALBUMIN: 4.6 G/DL (ref 3.5–5)
ALP BLD-CCNC: 86 UNITS/L (ref 38–126)
ALT SERPL-CCNC: 40 UNITS/L (ref 4–35)
ANION GAP SERPL CALCULATED.3IONS-SCNC: 11.5 MMOL/L
AST SERPL-CCNC: 69 UNITS/L (ref 14–36)
BILIRUB SERPL-MCNC: 1.4 MG/DL (ref 0.2–1.3)
BUN BLDV-MCNC: 31 MG/DL (ref 7–17)
CALCIUM SERPL-MCNC: 9.3 MG/DL (ref 8.4–10.2)
CHLORIDE BLD-SCNC: 108 MMOL/L (ref 98–120)
CO2: 26 MMOL/L (ref 22–31)
CREAT SERPL-MCNC: 0.9 MG/DL (ref 0.5–1)
GFR CALCULATED: > 60
GLOBULIN: 3.4 G/DL
GLUCOSE: 85 MG/DL (ref 65–105)
POTASSIUM SERPL-SCNC: 5.5 MMOL/L (ref 3.6–5)
SODIUM BLD-SCNC: 140 MMOL/L (ref 135–145)
TOTAL PROTEIN, SERUM: 8 G/DL (ref 6.3–8.2)

## 2022-04-26 ENCOUNTER — TELEPHONE (OUTPATIENT)
Dept: FAMILY MEDICINE CLINIC | Age: 87
End: 2022-04-26

## 2022-04-26 DIAGNOSIS — R79.89 ABNORMAL LIVER FUNCTION TEST: Primary | ICD-10-CM

## 2022-04-26 DIAGNOSIS — E87.5 HYPERKALEMIA: ICD-10-CM

## 2022-04-26 NOTE — TELEPHONE ENCOUNTER
Please let Myah Smith know that her labs show that the potassium has increased slightly and the liver tests are no better. Please make sure that she is not drinking any Gatorade over-the-counter supplements. Avoid aspirin and other anti-inflammatories and limit Tylenol to no more than 1 or 2 doses daily. I would like her to have an ultrasound of her liver in light of the elevation of the bilirubin as well. Not sure why these numbers are going up and may be related to virus but would like to check the ultrasound to be sure and repeat the electrolytes next week.

## 2022-05-14 ENCOUNTER — OFFICE VISIT (OUTPATIENT)
Dept: FAMILY MEDICINE CLINIC | Age: 87
End: 2022-05-14
Payer: MEDICARE

## 2022-05-14 VITALS
SYSTOLIC BLOOD PRESSURE: 132 MMHG | TEMPERATURE: 98.5 F | HEART RATE: 88 BPM | DIASTOLIC BLOOD PRESSURE: 78 MMHG | RESPIRATION RATE: 18 BRPM | OXYGEN SATURATION: 95 %

## 2022-05-14 DIAGNOSIS — J20.9 ACUTE BRONCHITIS, UNSPECIFIED ORGANISM: ICD-10-CM

## 2022-05-14 DIAGNOSIS — R05.9 COUGH: Primary | ICD-10-CM

## 2022-05-14 PROCEDURE — 1123F ACP DISCUSS/DSCN MKR DOCD: CPT

## 2022-05-14 PROCEDURE — 99213 OFFICE O/P EST LOW 20 MIN: CPT

## 2022-05-14 PROCEDURE — G8417 CALC BMI ABV UP PARAM F/U: HCPCS

## 2022-05-14 PROCEDURE — 1036F TOBACCO NON-USER: CPT

## 2022-05-14 PROCEDURE — 4040F PNEUMOC VAC/ADMIN/RCVD: CPT

## 2022-05-14 PROCEDURE — 1090F PRES/ABSN URINE INCON ASSESS: CPT

## 2022-05-14 PROCEDURE — G8427 DOCREV CUR MEDS BY ELIG CLIN: HCPCS

## 2022-05-14 PROCEDURE — 99212 OFFICE O/P EST SF 10 MIN: CPT

## 2022-05-14 RX ORDER — BENZONATATE 100 MG/1
100 CAPSULE ORAL 3 TIMES DAILY PRN
Qty: 30 CAPSULE | Refills: 0 | Status: SHIPPED | OUTPATIENT
Start: 2022-05-14 | End: 2022-05-24

## 2022-05-14 RX ORDER — AZITHROMYCIN 250 MG/1
250 TABLET, FILM COATED ORAL SEE ADMIN INSTRUCTIONS
Qty: 6 TABLET | Refills: 0 | Status: SHIPPED | OUTPATIENT
Start: 2022-05-14 | End: 2022-05-19

## 2022-05-14 NOTE — PROGRESS NOTES
Subjective:       Jory Wilcox is a 80 y.o. female here for evaluation of a cough. The cough is without wheezing, dyspnea or hemoptysis, productive of green/yellow sputum, worsening over time and is aggravated by nothing. Onset of symptoms was 2 weeks ago, gradually worsening since that time. Associated symptoms include chills and sputum production. Patient does not have a history of asthma. Patient has not had recent travel. Patient does not have a history of smoking. Patient  has not had a previous chest x-ray. Patient has not had a PPD done. Patient's medications, allergies, past medical, surgical, social and family histories were reviewed and updated as appropriate. She did take an at home COVID test which was negative several days ago. Review of Systems  Pertinent items are noted in HPI.        Objective:      Oxygen saturation 95% on room air  /78 (Site: Left Upper Arm, Position: Sitting, Cuff Size: Medium Adult)   Pulse 88   Temp 98.5 °F (36.9 °C)   Resp 18   SpO2 95%     General Appearance:    Alert, cooperative, no distress, appears stated age   Head:    Normocephalic, without obvious abnormality, atraumatic   Eyes:    PERRL, conjunctiva/corneas clear, EOM's intact, fundi     benign, both eyes   Ears:    Normal TM's and external ear canals, both ears   Nose:   Nares normal, septum midline, mucosa normal, no drainage    or sinus tenderness   Throat:   Lips, mucosa, and tongue normal; teeth and gums normal   Neck:   Supple, symmetrical, trachea midline, no adenopathy;     thyroid:  no enlargement/tenderness/nodules; no carotid    bruit or JVD   Back:     Symmetric, no curvature, ROM normal, no CVA tenderness   Lungs:     Clear to auscultation bilaterally, respirations unlabored   Chest Wall:    No tenderness or deformity    Heart:    Regular rate and rhythm, S1 and S2 normal, no murmur, rub   or gallop   Breast Exam:    No tenderness, masses, or nipple abnormality   Abdomen:     Soft, non-tender, bowel sounds active all four quadrants,     no masses, no organomegaly   Genitalia:    Normal female without lesion, discharge or tenderness   Rectal:    Normal tone ;guaiac negative stool   Extremities:   Extremities normal, atraumatic, no cyanosis or edema   Pulses:   2+ and symmetric all extremities   Skin:   Skin color, texture, turgor normal, no rashes or lesions   Lymph nodes:   Cervical, supraclavicular, and axillary nodes normal   Neurologic:   CNII-XII intact, normal strength, sensation and reflexes     throughout         Assessment:      Acute Bronchitis and Cough      Plan:      Antibiotics per medication orders. Antitussives per medication orders. Avoid exposure to tobacco smoke and fumes. Call if shortness of breath worsens, blood in sputum, change in character of cough, development of fever or chills, inability to maintain nutrition and hydration. Avoid exposure to tobacco smoke and fumes.

## 2022-06-23 LAB
ALBUMIN/GLOBULIN RATIO: 1.6 G/DL
ALBUMIN: 4.5 G/DL (ref 3.5–5)
ALP BLD-CCNC: 96 UNITS/L (ref 38–126)
ALT SERPL-CCNC: 31 UNITS/L (ref 4–35)
ANION GAP SERPL CALCULATED.3IONS-SCNC: 5.9 MMOL/L
AST SERPL-CCNC: 31 UNITS/L (ref 14–36)
BILIRUB SERPL-MCNC: 0.6 MG/DL (ref 0.2–1.3)
BUN BLDV-MCNC: 28 MG/DL (ref 7–17)
CALCIUM SERPL-MCNC: 9.3 MG/DL (ref 8.4–10.2)
CHLORIDE BLD-SCNC: 107 MMOL/L (ref 98–120)
CO2: 29 MMOL/L (ref 22–31)
CREAT SERPL-MCNC: 0.8 MG/DL (ref 0.5–1)
GFR CALCULATED: > 60
GLOBULIN: 2.8 G/DL
GLUCOSE: 104 MG/DL (ref 65–105)
POTASSIUM SERPL-SCNC: 4.5 MMOL/L (ref 3.6–5)
SODIUM BLD-SCNC: 142 MMOL/L (ref 135–145)
TOTAL PROTEIN, SERUM: 7.3 G/DL (ref 6.3–8.2)

## 2022-06-24 LAB
IGA: 148 MG/DL (ref 70–400)
IGG: 957 MG/DL (ref 700–1600)
IGM: 131 MG/DL (ref 40–230)

## 2022-06-27 LAB
BANDED NEUTROPHILS RELATIVE PERCENT: 0 % (ref 0–5)
BASOPHILS %. MANUAL COUNT: 0 % (ref 0–1)
EOSINOPHILS % MANUAL COUNT: 1 (ref 0–10)
HCT VFR BLD CALC: 41 % (ref 37–47)
HEMOGLOBIN: 13.6 (ref 12–16)
LYMPHOCYTES % MANUAL COUNT: 84 % (ref 21–51)
MACROCYTOSIS: NORMAL
MCH RBC QN AUTO: 31.5 PG (ref 28.5–32.5)
MCHC RBC AUTO-ENTMCNC: 33.2 G/DL (ref 32–37)
MCV RBC AUTO: 95.1 FL (ref 80–94)
MONOCYTES RELATIVE PERCENT: 2 % (ref 2–9)
NEUTROPHILS %. MANUAL COUNT: 13 % (ref 42–75)
PDW BLD-RTO: 13.6 % (ref 8.5–15.5)
PLATELET # BLD: 183.5 THOU/MM3 (ref 130–400)
RBC: 4.31 M/UL (ref 4.2–5.4)
SMUDGE CELLS: NORMAL
WBC: 22.8 THOU/ML3 (ref 4.8–10.8)

## 2022-10-17 ENCOUNTER — OFFICE VISIT (OUTPATIENT)
Dept: FAMILY MEDICINE CLINIC | Age: 87
End: 2022-10-17
Payer: MEDICARE

## 2022-10-17 VITALS
OXYGEN SATURATION: 96 % | HEIGHT: 63 IN | DIASTOLIC BLOOD PRESSURE: 66 MMHG | HEART RATE: 84 BPM | SYSTOLIC BLOOD PRESSURE: 130 MMHG | WEIGHT: 173 LBS | BODY MASS INDEX: 30.65 KG/M2

## 2022-10-17 DIAGNOSIS — Z00.00 MEDICARE ANNUAL WELLNESS VISIT, SUBSEQUENT: Primary | ICD-10-CM

## 2022-10-17 DIAGNOSIS — Z23 NEED FOR INFLUENZA VACCINATION: ICD-10-CM

## 2022-10-17 DIAGNOSIS — C91.10 CHRONIC LYMPHATIC LEUKEMIA (HCC): ICD-10-CM

## 2022-10-17 DIAGNOSIS — N18.31 STAGE 3A CHRONIC KIDNEY DISEASE (HCC): ICD-10-CM

## 2022-10-17 DIAGNOSIS — I10 PRIMARY HYPERTENSION: ICD-10-CM

## 2022-10-17 DIAGNOSIS — F32.1 CURRENT MODERATE EPISODE OF MAJOR DEPRESSIVE DISORDER WITHOUT PRIOR EPISODE (HCC): ICD-10-CM

## 2022-10-17 DIAGNOSIS — I82.491 ACUTE DEEP VEIN THROMBOSIS (DVT) OF OTHER SPECIFIED VEIN OF RIGHT LOWER EXTREMITY (HCC): ICD-10-CM

## 2022-10-17 PROCEDURE — G8484 FLU IMMUNIZE NO ADMIN: HCPCS | Performed by: FAMILY MEDICINE

## 2022-10-17 PROCEDURE — G8417 CALC BMI ABV UP PARAM F/U: HCPCS | Performed by: FAMILY MEDICINE

## 2022-10-17 PROCEDURE — 1036F TOBACCO NON-USER: CPT | Performed by: FAMILY MEDICINE

## 2022-10-17 PROCEDURE — 99213 OFFICE O/P EST LOW 20 MIN: CPT | Performed by: FAMILY MEDICINE

## 2022-10-17 PROCEDURE — G0439 PPPS, SUBSEQ VISIT: HCPCS | Performed by: FAMILY MEDICINE

## 2022-10-17 PROCEDURE — PBSHW INFLUENZA, FLUAD, (AGE 65 Y+), IM, PF, 0.5 ML: Performed by: FAMILY MEDICINE

## 2022-10-17 PROCEDURE — 90694 VACC AIIV4 NO PRSRV 0.5ML IM: CPT | Performed by: FAMILY MEDICINE

## 2022-10-17 PROCEDURE — G0008 ADMIN INFLUENZA VIRUS VAC: HCPCS | Performed by: FAMILY MEDICINE

## 2022-10-17 PROCEDURE — 1090F PRES/ABSN URINE INCON ASSESS: CPT | Performed by: FAMILY MEDICINE

## 2022-10-17 PROCEDURE — G8427 DOCREV CUR MEDS BY ELIG CLIN: HCPCS | Performed by: FAMILY MEDICINE

## 2022-10-17 PROCEDURE — 1123F ACP DISCUSS/DSCN MKR DOCD: CPT | Performed by: FAMILY MEDICINE

## 2022-10-17 SDOH — ECONOMIC STABILITY: FOOD INSECURITY: WITHIN THE PAST 12 MONTHS, YOU WORRIED THAT YOUR FOOD WOULD RUN OUT BEFORE YOU GOT MONEY TO BUY MORE.: NEVER TRUE

## 2022-10-17 SDOH — ECONOMIC STABILITY: FOOD INSECURITY: WITHIN THE PAST 12 MONTHS, THE FOOD YOU BOUGHT JUST DIDN'T LAST AND YOU DIDN'T HAVE MONEY TO GET MORE.: NEVER TRUE

## 2022-10-17 SDOH — ECONOMIC STABILITY: TRANSPORTATION INSECURITY
IN THE PAST 12 MONTHS, HAS LACK OF TRANSPORTATION KEPT YOU FROM MEETINGS, WORK, OR FROM GETTING THINGS NEEDED FOR DAILY LIVING?: NO

## 2022-10-17 SDOH — ECONOMIC STABILITY: TRANSPORTATION INSECURITY
IN THE PAST 12 MONTHS, HAS THE LACK OF TRANSPORTATION KEPT YOU FROM MEDICAL APPOINTMENTS OR FROM GETTING MEDICATIONS?: NO

## 2022-10-17 ASSESSMENT — PATIENT HEALTH QUESTIONNAIRE - PHQ9
10. IF YOU CHECKED OFF ANY PROBLEMS, HOW DIFFICULT HAVE THESE PROBLEMS MADE IT FOR YOU TO DO YOUR WORK, TAKE CARE OF THINGS AT HOME, OR GET ALONG WITH OTHER PEOPLE: 0
6. FEELING BAD ABOUT YOURSELF - OR THAT YOU ARE A FAILURE OR HAVE LET YOURSELF OR YOUR FAMILY DOWN: 0
3. TROUBLE FALLING OR STAYING ASLEEP: 3
4. FEELING TIRED OR HAVING LITTLE ENERGY: 3
2. FEELING DOWN, DEPRESSED OR HOPELESS: 0
SUM OF ALL RESPONSES TO PHQ QUESTIONS 1-9: 8
9. THOUGHTS THAT YOU WOULD BE BETTER OFF DEAD, OR OF HURTING YOURSELF: 0
5. POOR APPETITE OR OVEREATING: 2
1. LITTLE INTEREST OR PLEASURE IN DOING THINGS: 0
SUM OF ALL RESPONSES TO PHQ9 QUESTIONS 1 & 2: 0
7. TROUBLE CONCENTRATING ON THINGS, SUCH AS READING THE NEWSPAPER OR WATCHING TELEVISION: 0
8. MOVING OR SPEAKING SO SLOWLY THAT OTHER PEOPLE COULD HAVE NOTICED. OR THE OPPOSITE, BEING SO FIGETY OR RESTLESS THAT YOU HAVE BEEN MOVING AROUND A LOT MORE THAN USUAL: 0
SUM OF ALL RESPONSES TO PHQ QUESTIONS 1-9: 8

## 2022-10-17 ASSESSMENT — SOCIAL DETERMINANTS OF HEALTH (SDOH): HOW HARD IS IT FOR YOU TO PAY FOR THE VERY BASICS LIKE FOOD, HOUSING, MEDICAL CARE, AND HEATING?: NOT HARD AT ALL

## 2022-10-17 ASSESSMENT — LIFESTYLE VARIABLES
HOW OFTEN DO YOU HAVE A DRINK CONTAINING ALCOHOL: NEVER
HOW MANY STANDARD DRINKS CONTAINING ALCOHOL DO YOU HAVE ON A TYPICAL DAY: PATIENT DOES NOT DRINK

## 2022-10-17 NOTE — PROGRESS NOTES
Medicare Annual Wellness Visit    Lex Corona is here for Medicare AWV and Hypertension (6mo follow up)    Assessment & Plan   Medicare annual wellness visit, subsequent  Stage 3a chronic kidney disease ([facility] [address]  Current moderate episode of major depressive disorder without prior episode ([facility] [address]  Chronic lymphatic leukemia ([facility] [address]  Need for influenza vaccination  -     Influenza, FLUAD, (age 72 y+), IM, PF, 0.5 mL  Primary hypertension    Recommendations for Preventive Services Due: see orders and patient instructions/AVS.    Zbigniew Levine has continued to be asymptomatic with her previous chronic kidney disease. Most recent renal studies have been stable. Continue to avoid renal toxic medications. Continue to monitor regularly and adjust the Eliquis if needed. Hypertension is asymptomatic and well controlled at this time. While he does have a history of CAD and is being medically managed. She has not tolerated statins. Continue to follow labs regularly. At this point is able to manage her depressive symptoms with physical activity. Discussed monitoring for decreased mood over the winter and would consider SSRI therapy if increasing symptoms. Recommended screening schedule for the next 5-10 years is provided to the patient in written form: see Patient Instructions/AVS.     Return in 6 months (on 4/17/2023) for Medicare Annual Wellness Visit in 1 year, Medication recheck. Subjective   The following acute and/or chronic problems were also addressed today:    Still dealing with her left eye. Very cloudy. Has the glaucoma and cannot get the pressures down. Seeing the ophthamologist regularly. Saw Dr. Cecilio Acosta regularly for the regular check up for her CLL. Numbers have been stable. Continues on her eliquis at all. 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 No bleeding noted. Has been working on her rail fence at home.   Digging new posts and feels good when she stays physically active. Has some indoor work she would like to do yet. Looks forward to that. 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  2,4 ,6 grades. Keeps her busy. Patient's complete Health Risk Assessment and screening values have been reviewed and are found in Flowsheets. The following problems were reviewed today and where indicated follow up appointments were made and/or referrals ordered. Positive Risk Factor Screenings with Interventions:      Depression:  PHQ-2 Score: 0  PHQ-9 Total Score: 8    Severity:1-4 = minimal depression, 5-9 = mild depression, 10-14 = moderate depression, 15-19 = moderately severe depression, 20-27 = severe depression  Depression Interventions:  Patient states she has trouble sleeping - she will fall asleep but wake up many times. She does feel tired all the time but states she is always out doing something. She does not let it affect her daily life. She denies feeling down or depressed.            General Health and ACP:  General  In general, how would you say your health is?: Good  In the past 7 days, have you experienced any of the following: New or Increased Pain, New or Increased Fatigue, Loneliness, Social Isolation, Stress or Anger?: No  Do you get the social and emotional support that you need?: Yes  Do you have a Living Will?: Yes    Advance Directives       Power of  Living Will ACP-Advance Directive ACP-Power of     Not on File Not on File Not on File Not on File        General Health Risk Interventions:  No Living Will: Advance Care Planning addressed with patient today    Health Habits/Nutrition:  Physical Activity: Inactive    Days of Exercise per Week: 0 days    Minutes of Exercise per Session: 0 min     Have you lost any weight without trying in the past 3 months?: No  Body mass index: (!) 30.64  Have you seen the dentist within the past year?: Yes  Health Habits/Nutrition Interventions:  Inadequate physical activity: patient is not ready to increase his/her physical activity level at this time     Safety:  Do you have working smoke detectors?: Yes  Do you have any tripping hazards - loose or unsecured carpets or rugs?: (!) Yes  Do you have any tripping hazards - clutter in doorways, halls, or stairs?: No  Do you have either shower bars, grab bars, non-slip mats or non-slip surfaces in your shower or bathtub?: (!) No  Do all of your stairways have a railing or banister?: Yes  Do you always fasten your seatbelt when you are in a car?: Yes  Safety Interventions:  Home safety tips provided           Objective   Vitals:    10/17/22 1320   BP: 130/66   Site: Left Upper Arm   Position: Sitting   Cuff Size: Large Adult   Pulse: 84   SpO2: 96%   Weight: 173 lb (78.5 kg)   Height: 5' 3\" (1.6 m)      Body mass index is 30.65 kg/m². Physical Exam  Vitals and nursing note reviewed. Constitutional:       General: She is not in acute distress. Appearance: Normal appearance. She is not ill-appearing or diaphoretic. HENT:      Head: Normocephalic. Right Ear: Tympanic membrane and external ear normal.      Left Ear: Tympanic membrane and external ear normal.      Nose: Nose normal.      Mouth/Throat:      Pharynx: Uvula midline. Eyes:      General: No scleral icterus. Right eye: No discharge. Left eye: No discharge. Conjunctiva/sclera: Conjunctivae normal.   Cardiovascular:      Rate and Rhythm: Normal rate and regular rhythm. Heart sounds: Normal heart sounds. Pulmonary:      Effort: Pulmonary effort is normal. No respiratory distress. Breath sounds: Normal breath sounds. No stridor. No wheezing or rales. Chest:      Chest wall: No tenderness. Abdominal:      General: Bowel sounds are normal.      Palpations: Abdomen is soft. Musculoskeletal:         General: No tenderness. Normal range of motion. Cervical back: Normal range of motion and neck supple. Right lower leg: No edema. Left lower leg: No edema. Skin:     General: Skin is warm and dry. Capillary Refill: Capillary refill takes less than 2 seconds. Neurological:      Mental Status: She is alert and oriented to person, place, and time. Psychiatric:         Speech: Speech normal.         Behavior: Behavior normal.         Thought Content: Thought content normal.         Judgment: Judgment normal.      Comments: Mildly anxious            Allergies   Allergen Reactions    Aggrenox [Aspirin-Dipyridamole Er]      Sever HA    Amoxicillin Other (See Comments)    Atorvastatin      Other reaction(s): Muscle cramps    Doxycycline Other (See Comments)    Gabapentin     Guaifenesin Other (See Comments)    Prednisone     Vicodin [Hydrocodone-Acetaminophen]      Prior to Visit Medications    Medication Sig Taking?  Authorizing Provider   fluticasone (FLONASE) 50 MCG/ACT nasal spray instill 2 sprays into each nostril once daily Yes Clifford Bazzi MD   Cholecalciferol (VITAMIN D) 2000 units CAPS capsule Take by mouth daily Yes Historical Provider, MD   LUMIGAN 0.01 % SOLN ophthalmic drops  Yes Historical Provider, MD   Cranberry 500 MG CAPS Take 500 mg by mouth 2 times daily Yes Clifford Bazzi MD   apixaban (ELIQUIS) 5 MG TABS tablet TAKE 1 TABLET TWICE DAILY  Clifford Bazzi MD   carvedilol (COREG) 6.25 MG tablet TAKE 1 TABLET TWICE DAILY  Clifford Bazzi MD   hydroCHLOROthiazide (HYDRODIURIL) 25 MG tablet TAKE 1 TABLET EVERY DAY  Clifford Bazzi MD   Handicap Placard MISC by Does not apply route Duration 5 years expires 10/11/2026  Clifford Bazzi MD       [facility] (Including outside providers/suppliers regularly involved in providing care):   Patient Care Team:  Clifford Bazzi MD as PCP - General (Family Medicine)  Clifford Bazzi MD as PCP - [facility] Empaneled Provider  Hunter Medina MD as Consulting Physician (Internal Medicine Cardiovascular Disease)  Loren Cartagena MD as Consulting Physician (Hematology and Oncology)     Reviewed and updated this visit:  Tobacco  Allergies  Meds  Problems  Med Hx  Surg Hx  Soc Hx  Fam Hx               Medicare Annual Wellness Visit    Staci Ovalles is here for Medicare AWV and Hypertension (6mo follow up)    Assessment & Plan   Medicare annual wellness visit, subsequent  Stage 3a chronic kidney disease ([facility] [address]  Current moderate episode of major depressive disorder without prior episode ([facility] [address]  Chronic lymphatic leukemia ([facility] [address]  Need for influenza vaccination  -     Influenza, FLUAD, (age 72 y+), IM, PF, 0.5 mL  Primary hypertension    Recommendations for Preventive Services Due: see orders and patient instructions/AVS.  Recommended screening schedule for the next 5-10 years is provided to the patient in written form: see Patient Instructions/AVS.     Return in 6 months (on 4/17/2023) for Medicare Annual Wellness Visit in 1 year, Medication recheck. Reviewed and addendums made by myself on the day of the visit with the patient.  Halima Mayorga MD

## 2022-10-17 NOTE — PATIENT INSTRUCTIONS
Personalized Preventive Plan for Baljeet De La Garza - 10/17/2022  Medicare offers a range of preventive health benefits. Some of the tests and screenings are paid in full while other may be subject to a deductible, co-insurance, and/or copay. Some of these benefits include a comprehensive review of your medical history including lifestyle, illnesses that may run in your family, and various assessments and screenings as appropriate. After reviewing your medical record and screening and assessments performed today your provider may have ordered immunizations, labs, imaging, and/or referrals for you. A list of these orders (if applicable) as well as your Preventive Care list are included within your After Visit Summary for your review. Other Preventive Recommendations:    A preventive eye exam performed by an eye specialist is recommended every 1-2 years to screen for glaucoma; cataracts, macular degeneration, and other eye disorders. A preventive dental visit is recommended every 6 months. Try to get at least 150 minutes of exercise per week or 10,000 steps per day on a pedometer . Order or download the FREE \"Exercise & Physical Activity: Your Everyday Guide\" from [employer]. Call 0-723.943.9960 or search [employer] online. You need 2808-0803 mg of calcium and 2582-8149 IU of vitamin D per day. It is possible to meet your calcium requirement with diet alone, but a vitamin D supplement is usually necessary to meet this goal.  When exposed to the sun, use a sunscreen that protects against both UVA and UVB radiation with an SPF of 30 or greater. Reapply every 2 to 3 hours or after sweating, drying off with a towel, or swimming. Always wear a seat belt when traveling in a car. Always wear a helmet when riding a bicycle or motorcycle. Personalized Preventive Plan for Baljeet De La Garza - 10/17/2022  Medicare offers a range of preventive health benefits.  Some of the tests and screenings are paid in full while other may be subject to a deductible, co-insurance, and/or copay. Some of these benefits include a comprehensive review of your medical history including lifestyle, illnesses that may run in your family, and various assessments and screenings as appropriate. After reviewing your medical record and screening and assessments performed today your provider may have ordered immunizations, labs, imaging, and/or referrals for you. A list of these orders (if applicable) as well as your Preventive Care list are included within your After Visit Summary for your review. Other Preventive Recommendations:    A preventive eye exam performed by an eye specialist is recommended every 1-2 years to screen for glaucoma; cataracts, macular degeneration, and other eye disorders. A preventive dental visit is recommended every 6 months. Try to get at least 150 minutes of exercise per week or 10,000 steps per day on a pedometer . Order or download the FREE \"Exercise & Physical Activity: Your Everyday Guide\" from [employer]. Call 5-347.679.5181 or search [employer] online. You need 1709-2374 mg of calcium and 4233-7364 IU of vitamin D per day. It is possible to meet your calcium requirement with diet alone, but a vitamin D supplement is usually necessary to meet this goal.  When exposed to the sun, use a sunscreen that protects against both UVA and UVB radiation with an SPF of 30 or greater. Reapply every 2 to 3 hours or after sweating, drying off with a towel, or swimming. Always wear a seat belt when traveling in a car. Always wear a helmet when riding a bicycle or motorcycle.

## 2022-10-17 NOTE — PROGRESS NOTES
Have you had an allergic reaction to the flu (influenza) shot? no  Are you allergic to eggs or any component of the flu vaccine? no  Do you have a history of Guillain-[place] Syndrome (GBS), which is paralysis after receiving the flu vaccine? no  Are you feeling well today? yes  Flu vaccine given as ordered. Patient tolerated it well. No questions re: VIS information. After obtaining consent, and per orders of Dr. Kaelyn Odonnell, injection of FLU VACCINE given in Left deltoid by Sarah Ponce LPN. Patient tolerated well. Patient instructed to remain in clinic for 20 minutes afterwards, and to report any adverse reaction immediately.

## 2022-10-17 NOTE — TELEPHONE ENCOUNTER
Veronica Melissa is requesting a refill on the following medication(s):  Requested Prescriptions     Pending Prescriptions Disp Refills    apixaban (ELIQUIS) 5 MG TABS tablet [Pharmacy Med Name: ELIQUIS 5 MG Tablet] 180 tablet 3     Sig: TAKE 1 TABLET TWICE DAILY    carvedilol (COREG) 6.25 MG tablet [Pharmacy Med Name: CARVEDILOL 6.25 MG Tablet] 180 tablet 3     Sig: TAKE 1 TABLET TWICE DAILY    hydroCHLOROthiazide (HYDRODIURIL) 25 MG tablet [Pharmacy Med Name: HYDROCHLOROTHIAZIDE 25 MG Tablet] 90 tablet 3     Sig: TAKE 1 TABLET EVERY DAY       Last Visit Date (If Applicable):  0/25/7967    Next Visit Date:    10/17/2022

## 2022-10-18 RX ORDER — CARVEDILOL 6.25 MG/1
TABLET ORAL
Qty: 180 TABLET | Refills: 3 | Status: SHIPPED | OUTPATIENT
Start: 2022-10-18

## 2022-10-18 RX ORDER — HYDROCHLOROTHIAZIDE 25 MG/1
TABLET ORAL
Qty: 90 TABLET | Refills: 3 | Status: SHIPPED | OUTPATIENT
Start: 2022-10-18

## 2023-04-03 RX ORDER — FLUTICASONE PROPIONATE 50 MCG
SPRAY, SUSPENSION (ML) NASAL
Qty: 16 G | Refills: 5 | Status: SHIPPED | OUTPATIENT
Start: 2023-04-03

## 2023-04-03 NOTE — TELEPHONE ENCOUNTER
Nia Montalvo is requesting a refill on the following medication(s):  Requested Prescriptions     Pending Prescriptions Disp Refills    fluticasone (FLONASE) 50 MCG/ACT nasal spray [Pharmacy Med Name: FLUTICASONE PROP 50 MCG SPRAY] 16 g 5     Sig: instill 2 sprays into each nostril once daily       Last Visit Date (If Applicable):  56/25/8886    Next Visit Date:    4/17/2023

## 2023-04-17 ENCOUNTER — OFFICE VISIT (OUTPATIENT)
Dept: FAMILY MEDICINE CLINIC | Age: 88
End: 2023-04-17
Payer: MEDICARE

## 2023-04-17 VITALS
BODY MASS INDEX: 31.18 KG/M2 | DIASTOLIC BLOOD PRESSURE: 66 MMHG | WEIGHT: 176 LBS | HEART RATE: 77 BPM | OXYGEN SATURATION: 98 % | SYSTOLIC BLOOD PRESSURE: 132 MMHG

## 2023-04-17 DIAGNOSIS — C91.10 CHRONIC LYMPHATIC LEUKEMIA (HCC): ICD-10-CM

## 2023-04-17 DIAGNOSIS — F32.1 CURRENT MODERATE EPISODE OF MAJOR DEPRESSIVE DISORDER WITHOUT PRIOR EPISODE (HCC): ICD-10-CM

## 2023-04-17 DIAGNOSIS — E78.2 MIXED HYPERLIPIDEMIA: ICD-10-CM

## 2023-04-17 DIAGNOSIS — N18.31 STAGE 3A CHRONIC KIDNEY DISEASE (HCC): ICD-10-CM

## 2023-04-17 DIAGNOSIS — I10 PRIMARY HYPERTENSION: Primary | ICD-10-CM

## 2023-04-17 PROCEDURE — 1036F TOBACCO NON-USER: CPT | Performed by: FAMILY MEDICINE

## 2023-04-17 PROCEDURE — G8417 CALC BMI ABV UP PARAM F/U: HCPCS | Performed by: FAMILY MEDICINE

## 2023-04-17 PROCEDURE — 99212 OFFICE O/P EST SF 10 MIN: CPT | Performed by: FAMILY MEDICINE

## 2023-04-17 PROCEDURE — G8427 DOCREV CUR MEDS BY ELIG CLIN: HCPCS | Performed by: FAMILY MEDICINE

## 2023-04-17 PROCEDURE — 1090F PRES/ABSN URINE INCON ASSESS: CPT | Performed by: FAMILY MEDICINE

## 2023-04-17 PROCEDURE — 99214 OFFICE O/P EST MOD 30 MIN: CPT | Performed by: FAMILY MEDICINE

## 2023-04-17 PROCEDURE — 1123F ACP DISCUSS/DSCN MKR DOCD: CPT | Performed by: FAMILY MEDICINE

## 2023-04-17 SDOH — ECONOMIC STABILITY: INCOME INSECURITY: HOW HARD IS IT FOR YOU TO PAY FOR THE VERY BASICS LIKE FOOD, HOUSING, MEDICAL CARE, AND HEATING?: NOT HARD AT ALL

## 2023-04-17 SDOH — ECONOMIC STABILITY: FOOD INSECURITY: WITHIN THE PAST 12 MONTHS, THE FOOD YOU BOUGHT JUST DIDN'T LAST AND YOU DIDN'T HAVE MONEY TO GET MORE.: NEVER TRUE

## 2023-04-17 SDOH — ECONOMIC STABILITY: FOOD INSECURITY: WITHIN THE PAST 12 MONTHS, YOU WORRIED THAT YOUR FOOD WOULD RUN OUT BEFORE YOU GOT MONEY TO BUY MORE.: NEVER TRUE

## 2023-04-17 SDOH — ECONOMIC STABILITY: HOUSING INSECURITY
IN THE LAST 12 MONTHS, WAS THERE A TIME WHEN YOU DID NOT HAVE A STEADY PLACE TO SLEEP OR SLEPT IN A SHELTER (INCLUDING NOW)?: NO

## 2023-04-17 ASSESSMENT — PATIENT HEALTH QUESTIONNAIRE - PHQ9
9. THOUGHTS THAT YOU WOULD BE BETTER OFF DEAD, OR OF HURTING YOURSELF: 0
1. LITTLE INTEREST OR PLEASURE IN DOING THINGS: 0
6. FEELING BAD ABOUT YOURSELF - OR THAT YOU ARE A FAILURE OR HAVE LET YOURSELF OR YOUR FAMILY DOWN: 0
SUM OF ALL RESPONSES TO PHQ QUESTIONS 1-9: 5
SUM OF ALL RESPONSES TO PHQ QUESTIONS 1-9: 5
5. POOR APPETITE OR OVEREATING: 0
SUM OF ALL RESPONSES TO PHQ QUESTIONS 1-9: 5
7. TROUBLE CONCENTRATING ON THINGS, SUCH AS READING THE NEWSPAPER OR WATCHING TELEVISION: 0
4. FEELING TIRED OR HAVING LITTLE ENERGY: 3
3. TROUBLE FALLING OR STAYING ASLEEP: 2
2. FEELING DOWN, DEPRESSED OR HOPELESS: 0
SUM OF ALL RESPONSES TO PHQ QUESTIONS 1-9: 5
10. IF YOU CHECKED OFF ANY PROBLEMS, HOW DIFFICULT HAVE THESE PROBLEMS MADE IT FOR YOU TO DO YOUR WORK, TAKE CARE OF THINGS AT HOME, OR GET ALONG WITH OTHER PEOPLE: 1
SUM OF ALL RESPONSES TO PHQ9 QUESTIONS 1 & 2: 0
8. MOVING OR SPEAKING SO SLOWLY THAT OTHER PEOPLE COULD HAVE NOTICED. OR THE OPPOSITE, BEING SO FIGETY OR RESTLESS THAT YOU HAVE BEEN MOVING AROUND A LOT MORE THAN USUAL: 0

## 2023-04-17 NOTE — PROGRESS NOTES
lower leg: No edema. Skin:     General: Skin is warm and dry. Capillary Refill: Capillary refill takes less than 2 seconds. Neurological:      Mental Status: She is alert and oriented to person, place, and time. Psychiatric:         Speech: Speech normal.         Behavior: Behavior normal.         Thought Content: Thought content normal.         Judgment: Judgment normal.      Comments: Mildly anxious             Multiple labs and other testing may have been ordered which may not be completely evident from the above note due to system interface incompatibilities. Patient given educational materials - see patientinstructions. Discussed use, benefit, and side effects of prescribed medications. All patient questions answered. Pt voiced understanding. Reviewed health maintenance. Instructed to continue current medications, diet andexercise. Patient agreed with treatment plan. Follow up as directed.      (Please note that portions of this note were completed with a voice-recognition program. Efforts were made to edit the dictation but occasionally words are mis-transcribed.)    Electronically signed by David Watson MD on 4/17/2023

## 2023-10-19 ENCOUNTER — OFFICE VISIT (OUTPATIENT)
Dept: FAMILY MEDICINE CLINIC | Age: 88
End: 2023-10-19
Payer: MEDICARE

## 2023-10-19 VITALS
HEIGHT: 63 IN | BODY MASS INDEX: 31.01 KG/M2 | SYSTOLIC BLOOD PRESSURE: 136 MMHG | HEART RATE: 69 BPM | DIASTOLIC BLOOD PRESSURE: 78 MMHG | WEIGHT: 175 LBS | OXYGEN SATURATION: 97 %

## 2023-10-19 DIAGNOSIS — C91.10 CHRONIC LYMPHATIC LEUKEMIA (HCC): ICD-10-CM

## 2023-10-19 DIAGNOSIS — Z00.00 MEDICARE ANNUAL WELLNESS VISIT, SUBSEQUENT: Primary | ICD-10-CM

## 2023-10-19 DIAGNOSIS — N18.31 STAGE 3A CHRONIC KIDNEY DISEASE (HCC): ICD-10-CM

## 2023-10-19 DIAGNOSIS — Z23 NEED FOR INFLUENZA VACCINATION: ICD-10-CM

## 2023-10-19 DIAGNOSIS — I10 PRIMARY HYPERTENSION: ICD-10-CM

## 2023-10-19 PROCEDURE — G8484 FLU IMMUNIZE NO ADMIN: HCPCS | Performed by: FAMILY MEDICINE

## 2023-10-19 PROCEDURE — 1123F ACP DISCUSS/DSCN MKR DOCD: CPT | Performed by: FAMILY MEDICINE

## 2023-10-19 PROCEDURE — 90694 VACC AIIV4 NO PRSRV 0.5ML IM: CPT | Performed by: FAMILY MEDICINE

## 2023-10-19 PROCEDURE — PBSHW INFLUENZA, FLUAD, (AGE 65 Y+), IM, PF, 0.5 ML: Performed by: FAMILY MEDICINE

## 2023-10-19 PROCEDURE — G0439 PPPS, SUBSEQ VISIT: HCPCS | Performed by: FAMILY MEDICINE

## 2023-10-19 ASSESSMENT — PATIENT HEALTH QUESTIONNAIRE - PHQ9
8. MOVING OR SPEAKING SO SLOWLY THAT OTHER PEOPLE COULD HAVE NOTICED. OR THE OPPOSITE, BEING SO FIGETY OR RESTLESS THAT YOU HAVE BEEN MOVING AROUND A LOT MORE THAN USUAL: 0
6. FEELING BAD ABOUT YOURSELF - OR THAT YOU ARE A FAILURE OR HAVE LET YOURSELF OR YOUR FAMILY DOWN: 0
9. THOUGHTS THAT YOU WOULD BE BETTER OFF DEAD, OR OF HURTING YOURSELF: 0
7. TROUBLE CONCENTRATING ON THINGS, SUCH AS READING THE NEWSPAPER OR WATCHING TELEVISION: 0
SUM OF ALL RESPONSES TO PHQ QUESTIONS 1-9: 0
1. LITTLE INTEREST OR PLEASURE IN DOING THINGS: 0
SUM OF ALL RESPONSES TO PHQ9 QUESTIONS 1 & 2: 0
10. IF YOU CHECKED OFF ANY PROBLEMS, HOW DIFFICULT HAVE THESE PROBLEMS MADE IT FOR YOU TO DO YOUR WORK, TAKE CARE OF THINGS AT HOME, OR GET ALONG WITH OTHER PEOPLE: 0
SUM OF ALL RESPONSES TO PHQ QUESTIONS 1-9: 0
5. POOR APPETITE OR OVEREATING: 0
2. FEELING DOWN, DEPRESSED OR HOPELESS: 0
3. TROUBLE FALLING OR STAYING ASLEEP: 0
4. FEELING TIRED OR HAVING LITTLE ENERGY: 0

## 2023-10-19 NOTE — PROGRESS NOTES
Have you had an allergic reaction to the flu (influenza) shot? No  Are you allergic to eggs or any component of the flu vaccine? No  Do you have a history of Guillain-[place] Syndrome (GBS), which is paralysis after receiving the flu vaccine? No  Are you feeling well today? Yes  Flu vaccine given as ordered. Patient tolerated it well. No questions re: VIS information. After obtaining consent, and per orders of , injection of FLU VACCINE given in Left vastus lateralis by Dorthey Bosworth, MA. Patient tolerated well. Patient instructed to remain in clinic for 20 minutes afterwards, and to report any adverse reaction immediately.

## 2023-11-17 ENCOUNTER — OFFICE VISIT (OUTPATIENT)
Dept: FAMILY MEDICINE CLINIC | Age: 88
End: 2023-11-17
Payer: MEDICARE

## 2023-11-17 VITALS
HEART RATE: 83 BPM | BODY MASS INDEX: 31.18 KG/M2 | WEIGHT: 176 LBS | OXYGEN SATURATION: 98 % | DIASTOLIC BLOOD PRESSURE: 74 MMHG | SYSTOLIC BLOOD PRESSURE: 124 MMHG

## 2023-11-17 DIAGNOSIS — D75.9 HYPERVISCOSITY: ICD-10-CM

## 2023-11-17 DIAGNOSIS — M79.89 LEG SWELLING: ICD-10-CM

## 2023-11-17 DIAGNOSIS — M79.605 PAIN OF LEFT LOWER EXTREMITY: ICD-10-CM

## 2023-11-17 DIAGNOSIS — Z86.718 HISTORY OF DVT IN ADULTHOOD: Primary | ICD-10-CM

## 2023-11-17 DIAGNOSIS — C91.10 CHRONIC LYMPHATIC LEUKEMIA (HCC): ICD-10-CM

## 2023-11-17 PROCEDURE — 1123F ACP DISCUSS/DSCN MKR DOCD: CPT | Performed by: FAMILY MEDICINE

## 2023-11-17 PROCEDURE — 99212 OFFICE O/P EST SF 10 MIN: CPT | Performed by: FAMILY MEDICINE

## 2023-11-17 PROCEDURE — 1036F TOBACCO NON-USER: CPT | Performed by: FAMILY MEDICINE

## 2023-11-17 PROCEDURE — 99214 OFFICE O/P EST MOD 30 MIN: CPT | Performed by: FAMILY MEDICINE

## 2023-11-17 PROCEDURE — G8427 DOCREV CUR MEDS BY ELIG CLIN: HCPCS | Performed by: FAMILY MEDICINE

## 2023-11-17 PROCEDURE — G8484 FLU IMMUNIZE NO ADMIN: HCPCS | Performed by: FAMILY MEDICINE

## 2023-11-17 PROCEDURE — 1090F PRES/ABSN URINE INCON ASSESS: CPT | Performed by: FAMILY MEDICINE

## 2023-11-17 PROCEDURE — G8417 CALC BMI ABV UP PARAM F/U: HCPCS | Performed by: FAMILY MEDICINE

## 2023-11-17 NOTE — PROGRESS NOTES
dry.      Capillary Refill: Capillary refill takes less than 2 seconds. Neurological:      Mental Status: She is alert and oriented to person, place, and time. Psychiatric:         Speech: Speech normal.         Behavior: Behavior normal.         Thought Content: Thought content normal.         Judgment: Judgment normal.      Comments: Mildly anxious               Multiple labs and other testing may have been ordered which may not be completely evident from the above note due to system interface incompatibilities. Patient given educational materials - see patientinstructions. Discussed use, benefit, and side effects of prescribed medications. All patient questions answered. Pt voiced understanding. Reviewed health maintenance. Instructed to continue current medications, diet andexercise. Patient agreed with treatment plan. Follow up as directed.      (Please note that portions of this note were completed with a voice-recognition program. Efforts were made to edit the dictation but occasionally words are mis-transcribed.)    Electronically signed by Daniel Mckeon MD on 11/17/2023

## 2024-01-23 DIAGNOSIS — I82.491 ACUTE DEEP VEIN THROMBOSIS (DVT) OF OTHER SPECIFIED VEIN OF RIGHT LOWER EXTREMITY (HCC): ICD-10-CM

## 2024-01-23 NOTE — TELEPHONE ENCOUNTER
Matilde Desai is requesting a refill on the following medication(s):  Requested Prescriptions     Pending Prescriptions Disp Refills    apixaban (ELIQUIS) 5 MG TABS tablet [Pharmacy Med Name: ELIQUIS 5 MG Tablet] 180 tablet 3     Sig: TAKE 1 TABLET TWICE DAILY    carvedilol (COREG) 6.25 MG tablet [Pharmacy Med Name: CARVEDILOL 6.25 MG Tablet] 180 tablet 3     Sig: TAKE 1 TABLET TWICE DAILY    hydroCHLOROthiazide (HYDRODIURIL) 25 MG tablet [Pharmacy Med Name: HYDROCHLOROTHIAZIDE 25 MG Tablet] 90 tablet 3     Sig: TAKE 1 TABLET EVERY DAY       Last Visit Date (If Applicable):  11/17/2023    Next Visit Date:    4/24/2024

## 2024-01-24 RX ORDER — HYDROCHLOROTHIAZIDE 25 MG/1
TABLET ORAL
Qty: 90 TABLET | Refills: 3 | Status: SHIPPED | OUTPATIENT
Start: 2024-01-24

## 2024-01-24 RX ORDER — CARVEDILOL 6.25 MG/1
TABLET ORAL
Qty: 180 TABLET | Refills: 3 | Status: SHIPPED | OUTPATIENT
Start: 2024-01-24

## 2024-04-24 ENCOUNTER — OFFICE VISIT (OUTPATIENT)
Dept: FAMILY MEDICINE CLINIC | Age: 89
End: 2024-04-24
Payer: MEDICARE

## 2024-04-24 VITALS
WEIGHT: 178 LBS | DIASTOLIC BLOOD PRESSURE: 78 MMHG | HEART RATE: 73 BPM | BODY MASS INDEX: 31.53 KG/M2 | OXYGEN SATURATION: 96 % | SYSTOLIC BLOOD PRESSURE: 138 MMHG

## 2024-04-24 DIAGNOSIS — C91.10 CHRONIC LYMPHATIC LEUKEMIA (HCC): ICD-10-CM

## 2024-04-24 DIAGNOSIS — N30.00 ACUTE CYSTITIS WITHOUT HEMATURIA: Primary | ICD-10-CM

## 2024-04-24 DIAGNOSIS — R30.0 BURNING WITH URINATION: ICD-10-CM

## 2024-04-24 DIAGNOSIS — N18.31 STAGE 3A CHRONIC KIDNEY DISEASE (HCC): ICD-10-CM

## 2024-04-24 DIAGNOSIS — F32.1 CURRENT MODERATE EPISODE OF MAJOR DEPRESSIVE DISORDER WITHOUT PRIOR EPISODE (HCC): ICD-10-CM

## 2024-04-24 LAB
BACTERIA, URINE: ABNORMAL /HPF
BILIRUBIN URINE: NEGATIVE
BILIRUBIN, POC: NEGATIVE
BLOOD URINE, POC: NORMAL
BLOOD, URINE: ABNORMAL
CASTS UA: ABNORMAL /LPF
CLARITY, POC: NORMAL
CLARITY: ABNORMAL
COLOR, POC: NORMAL
COLOR, URINE: YELLOW
CRYSTALS, UA: ABNORMAL
GLUCOSE URINE, POC: NEGATIVE
GLUCOSE URINE: NEGATIVE MG/DL
KETONES, POC: NEGATIVE
KETONES, URINE: NEGATIVE MG/DL
LEUKOCYTE EST, POC: NORMAL
LEUKOCYTE ESTERASE, URINE: ABNORMAL
NITRITE, POC: NEGATIVE
NITRITE, URINE: NEGATIVE
PH UA: 6.5 (ref 5–8.5)
PH, POC: 6.5
PROTEIN UA: NEGATIVE MG/DL
PROTEIN, POC: NORMAL
RBC URINE: ABNORMAL /HPF (ref 0–2)
SPECIFIC GRAVITY, POC: 1.01
SPECIFIC GRAVITY, URINE: 1.01 MG/DL (ref 1–1.03)
SQUAMOUS EPITHELIAL: ABNORMAL /HPF
UROBILINOGEN, POC: 0.2
UROBILINOGEN, URINE: 0.2 MG/DL (ref 0.2–1)
WBC URINE: ABNORMAL /HPF (ref 0–4)
YEAST, URINE: ABNORMAL

## 2024-04-24 PROCEDURE — 81002 URINALYSIS NONAUTO W/O SCOPE: CPT | Performed by: FAMILY MEDICINE

## 2024-04-24 PROCEDURE — 99212 OFFICE O/P EST SF 10 MIN: CPT | Performed by: FAMILY MEDICINE

## 2024-04-24 RX ORDER — CEPHALEXIN 500 MG/1
500 CAPSULE ORAL 3 TIMES DAILY
Qty: 30 CAPSULE | Refills: 0 | Status: SHIPPED | OUTPATIENT
Start: 2024-04-24 | End: 2024-04-27

## 2024-04-24 SDOH — ECONOMIC STABILITY: INCOME INSECURITY: HOW HARD IS IT FOR YOU TO PAY FOR THE VERY BASICS LIKE FOOD, HOUSING, MEDICAL CARE, AND HEATING?: NOT HARD AT ALL

## 2024-04-24 SDOH — ECONOMIC STABILITY: FOOD INSECURITY: WITHIN THE PAST 12 MONTHS, THE FOOD YOU BOUGHT JUST DIDN'T LAST AND YOU DIDN'T HAVE MONEY TO GET MORE.: NEVER TRUE

## 2024-04-24 SDOH — ECONOMIC STABILITY: FOOD INSECURITY: WITHIN THE PAST 12 MONTHS, YOU WORRIED THAT YOUR FOOD WOULD RUN OUT BEFORE YOU GOT MONEY TO BUY MORE.: NEVER TRUE

## 2024-04-24 ASSESSMENT — PATIENT HEALTH QUESTIONNAIRE - PHQ9
1. LITTLE INTEREST OR PLEASURE IN DOING THINGS: NOT AT ALL
SUM OF ALL RESPONSES TO PHQ QUESTIONS 1-9: 1
9. THOUGHTS THAT YOU WOULD BE BETTER OFF DEAD, OR OF HURTING YOURSELF: NOT AT ALL
7. TROUBLE CONCENTRATING ON THINGS, SUCH AS READING THE NEWSPAPER OR WATCHING TELEVISION: NOT AT ALL
SUM OF ALL RESPONSES TO PHQ QUESTIONS 1-9: 1
4. FEELING TIRED OR HAVING LITTLE ENERGY: SEVERAL DAYS
10. IF YOU CHECKED OFF ANY PROBLEMS, HOW DIFFICULT HAVE THESE PROBLEMS MADE IT FOR YOU TO DO YOUR WORK, TAKE CARE OF THINGS AT HOME, OR GET ALONG WITH OTHER PEOPLE: NOT DIFFICULT AT ALL
6. FEELING BAD ABOUT YOURSELF - OR THAT YOU ARE A FAILURE OR HAVE LET YOURSELF OR YOUR FAMILY DOWN: NOT AT ALL
SUM OF ALL RESPONSES TO PHQ QUESTIONS 1-9: 1
3. TROUBLE FALLING OR STAYING ASLEEP: NOT AT ALL
5. POOR APPETITE OR OVEREATING: NOT AT ALL
2. FEELING DOWN, DEPRESSED OR HOPELESS: NOT AT ALL
SUM OF ALL RESPONSES TO PHQ9 QUESTIONS 1 & 2: 0
SUM OF ALL RESPONSES TO PHQ QUESTIONS 1-9: 1
8. MOVING OR SPEAKING SO SLOWLY THAT OTHER PEOPLE COULD HAVE NOTICED. OR THE OPPOSITE, BEING SO FIGETY OR RESTLESS THAT YOU HAVE BEEN MOVING AROUND A LOT MORE THAN USUAL: NOT AT ALL

## 2024-04-24 NOTE — PROGRESS NOTES
Tympanic membrane and external ear normal.      Nose: Nose normal.      Mouth/Throat:      Pharynx: Uvula midline.   Eyes:      General: No scleral icterus.        Right eye: No discharge.         Left eye: No discharge.      Conjunctiva/sclera: Conjunctivae normal.   Cardiovascular:      Rate and Rhythm: Normal rate and regular rhythm.      Heart sounds: Normal heart sounds.   Pulmonary:      Effort: Pulmonary effort is normal. No respiratory distress.      Breath sounds: Normal breath sounds. No stridor. No wheezing or rales.   Chest:      Chest wall: No tenderness.   Abdominal:      General: Bowel sounds are normal.      Palpations: Abdomen is soft. There is no mass.      Tenderness: There is no abdominal tenderness. There is no right CVA tenderness or left CVA tenderness.   Musculoskeletal:         General: No tenderness. Normal range of motion.      Cervical back: Normal range of motion and neck supple.      Right lower leg: No edema.      Left lower leg: No edema.   Skin:     General: Skin is warm and dry.      Capillary Refill: Capillary refill takes less than 2 seconds.   Neurological:      Mental Status: She is alert and oriented to person, place, and time.   Psychiatric:         Speech: Speech normal.         Behavior: Behavior normal.         Thought Content: Thought content normal.         Judgment: Judgment normal.      Comments: Mildly anxious               Multiple labs and other testing may have been ordered which may not be completely evident from the above note due to system interface incompatibilities.     Patient given educational materials - see patientinstructions.  Discussed use, benefit, and side effects of prescribed medications.  All patient questions answered.  Pt voiced understanding. Reviewed health maintenance.  Instructed to continue current medications, diet andexercise.  Patient agreed with treatment plan. Follow up as directed.     (Please note that portions of this note were

## 2024-04-26 DIAGNOSIS — N30.00 ACUTE CYSTITIS WITHOUT HEMATURIA: ICD-10-CM

## 2024-04-27 ENCOUNTER — TELEPHONE (OUTPATIENT)
Dept: FAMILY MEDICINE CLINIC | Age: 89
End: 2024-04-27

## 2024-04-27 RX ORDER — LEVOFLOXACIN 750 MG/1
250 TABLET, FILM COATED ORAL DAILY
Qty: 5 TABLET | Refills: 0 | Status: CANCELLED | OUTPATIENT
Start: 2024-04-27 | End: 2024-05-07

## 2024-04-27 RX ORDER — LEVOFLOXACIN 250 MG/1
250 TABLET, FILM COATED ORAL DAILY
Qty: 10 TABLET | Refills: 0 | Status: SHIPPED | OUTPATIENT
Start: 2024-04-27 | End: 2024-05-07

## 2024-04-27 NOTE — PROGRESS NOTES
Received [employer] notice to call lab. Spoke with Vale who stated Matilde’s urine grew Citrobacter. Was not sensitive to keflex. Spoke with patient. She did not feel any better on the keflex. Ordered Levaquin 250 mg po daily # 10. Patient has reduced kidney function. Told patient to stop keflex and begin levaquin immediately. She will call me if she worsens( vomiting, chills, fever, flank pain).

## 2024-08-08 NOTE — TELEPHONE ENCOUNTER
Matilde Desai is calling to request a refill on the following medication(s):  Requested Prescriptions     Pending Prescriptions Disp Refills    fluticasone (FLONASE) 50 MCG/ACT nasal spray 16 g 5     Si sprays by Each Nostril route daily       Last Visit Date (If Applicable):  2024    Next Visit Date:    10/23/2024

## 2024-08-11 RX ORDER — FLUTICASONE PROPIONATE 50 MCG
2 SPRAY, SUSPENSION (ML) NASAL DAILY
Qty: 16 G | Refills: 5 | Status: SHIPPED | OUTPATIENT
Start: 2024-08-11

## 2024-10-05 NOTE — TELEPHONE ENCOUNTER
Final diagnoses:   Palpitations   Low thyroid stimulating hormone (TSH) level   Elevated blood pressure reading   Hypomagnesemia   Transaminitis     ED Disposition       ED Disposition   Discharge    Condition   Stable    Date/Time   Sat Oct 5, 2024  2:51 AM    Comment   Latisha Kelly discharge to home/self care.                   Assessment & Plan       Medical Decision Making  23-year-old female presenting for evaluation of shortness of breath    DDX including but not limited to: pneumonia, pleural effusion, CHF, SCAPE, PE, PTX, ACS, MI, COVID 19, EVALI, anemia, metabolic abnormality, renal failure.    Plan blood work, imaging, IV fluids, EKG    Patient valuation returned markable for minimally elevated blood pressure, mild hypomagnesemia, very low TSH and a minimal transaminitis.  Believe patient's low TSH is likely the cause of her palpitations, tachycardia.  Potentially postpartum thyroiditis.  Did discuss with endocrine, feel patient is safe for discharge at this time.  Offered propranolol for symptomatic relief, discussed potential use and side effect profile.  Patient elects to not pursue beta-blockade at this time.  Recommend family medicine follow-up for further evaluation of transaminitis.  Recommend endocrine follow-up, referral has been placed for these recommendations were relayed to patient who verbalized understanding.  Patient was discharged in good condition.      Amount and/or Complexity of Data Reviewed  Labs: ordered. Decision-making details documented in ED Course.  Radiology: ordered and independent interpretation performed.    Risk  Prescription drug management.        ED Course as of 10/06/24 0250   Fri Oct 04, 2024   2208 CBC and differential(!)   2232 ECG interpretation by me.  Sinus tachycardia at 122.  Normal intervals.  Normal axis.  No acute ischemic ST or T wave changes.   2233 hs TnI 0hr: 3   2248 Comprehensive metabolic panel(!)  Mild transaminitis   2248 CBC and  Message left to notify patient to call and schedule appointment. differential(!)  No anemia, thrombocytopenia   2311 BNP: 17   2311 POCT pregnancy, urine   Sat Oct 05, 2024   0027 TSH 3RD GENERATON(!): <0.010   0246 Discussed propranolol with patient, she would like to hold off at this point and be seen by endocrine first.       Medications   ondansetron (ZOFRAN) injection 4 mg (4 mg Intravenous Given 10/4/24 2258)   sodium chloride 0.9 % bolus 1,000 mL (0 mL Intravenous Stopped 10/4/24 2358)   magnesium sulfate 2 g/50 mL IVPB (premix) 2 g (0 g Intravenous Stopped 10/5/24 0123)   iohexol (OMNIPAQUE) 350 MG/ML injection (MULTI-DOSE) 85 mL (85 mL Intravenous Given 10/4/24 2343)   metoclopramide (REGLAN) injection 10 mg (10 mg Intravenous Given 10/4/24 2356)       ED Risk Strat Scores                                               History of Present Illness       Chief Complaint   Patient presents with    Shortness of Breath     Pt reports sob and palpitations. States symptoms started after pt gave birth (back in May) and never resolved. States did follow up with pulmonologist.        Past Medical History:   Diagnosis Date    Anemia     Chronic hypertension with super imposed preeclampsia without severe features 2024    Hypertension     chtn started medication with pregnancy    Hypothyroid     Insulin controlled gestational diabetes mellitus (GDM) in third trimester 2024    Migraine without aura     OTC med management    PCOS (polycystic ovarian syndrome)     Status post primary low transverse  section 12/15/2023    Varicella     vaccine    Visual impairment       Past Surgical History:   Procedure Laterality Date    CO CERCLAGE UTERINE CERVIX NONOBSTETRICAL N/A 2024    Procedure: CERCLAGE CERVICAL;  Surgeon: Ann Hoffmann MD;  [facility]: [facility];  Service: Obstetrics    CO  DELIVERY ONLY N/A 2024    Procedure:  SECTION ();  Surgeon: Lizzette Hernandez DO;  Location: [facility];  Service: Obstetrics    CO REMOVAL CERCLAGE SUTURE  UNDER ANESTHESIA N/A 5/7/2024    Procedure: CERCLAGE REMOVAL;  Surgeon: Lizzette Hernandez DO;  Location: [facility] ;  Service: Obstetrics    TURBINATE RESECTION        Family History   Problem Relation Age of Onset    No Known Problems Mother     Hypertension Father     No Known Problems Sister     No Known Problems Brother     No Known Problems Brother     No Known Problems Brother     Colon cancer Neg Hx     Ovarian cancer Neg Hx     Breast cancer Neg Hx     Cancer Neg Hx       Social History     Tobacco Use    Smoking status: Never    Smokeless tobacco: Never   Vaping Use    Vaping status: Former    Quit date: 1/1/2023    Substances: Nicotine, Flavoring   Substance Use Topics    Alcohol use: Not Currently     Alcohol/week: 7.0 standard drinks of alcohol     Types: 7 Glasses of wine per week     Comment: Socially    Drug use: Not Currently     Types: Marijuana     Comment: last used THC over 2 years ago (as of 11/29/23)      E-Cigarette/Vaping    E-Cigarette Use Former User     Quit Date 1/1/23     Comments very seldom, 3 times a year-quit pror to recent pregnancy       E-Cigarette/Vaping Substances    Nicotine Yes     THC No     CBD No     Flavoring Yes     Other No     Unknown No       I have reviewed and agree with the history as documented.     Patient is a 23-year-old female with a past medical history significant for anemia, hypertension, hypothyroidism, gestational diabetes with recent birth and May via [facility] presenting to the emergency department for evaluation of shortness of breath.  Patient states she has been having ongoing palpitations every day for the last 3 to 4 weeks.  She additionally notes since May she has been having shortness of breath that was initially while lying flat, adding she could not catch her breath, but now is at random times throughout the day.  She did see pulmonology in May who gave her Symbicort and albuterol.  She was instructed to follow-up for further pulmonary function  testing and reevaluation however patient reports she did not start other medication and did not follow-up because of responsibilities as a new mother.  She did schedule appoint with cardiology and pulmonology which she has next month but states that today she got short of breath with rest and once to ensure everything is all right.  She denies any associated chest tightness, chest pain.  Denies recent fevers or cough.  She does endorse generalized weakness.  She reports she does have a history of anemia but is not currently on any medication for it        Past Medical History:  No date: Anemia  2024: Chronic hypertension with super imposed preeclampsia   without severe features  No date: Hypertension      Comment:  chtn started medication with pregnancy  No date: Hypothyroid  2024: Insulin controlled gestational diabetes mellitus (GDM) in   third trimester  No date: Migraine without aura      Comment:  OTC med management  No date: PCOS (polycystic ovarian syndrome)  12/15/2023: Status post primary low transverse  section  No date: Varicella      Comment:  vaccine  No date: Visual impairment          Review of Systems   Constitutional:  Negative for chills and fever.   HENT:  Negative for ear pain and sore throat.    Eyes:  Negative for pain and visual disturbance.   Respiratory:  Positive for shortness of breath. Negative for cough.    Cardiovascular:  Negative for chest pain and palpitations.   Gastrointestinal:  Negative for abdominal pain and vomiting.   Genitourinary:  Negative for dysuria and hematuria.   Musculoskeletal:  Negative for arthralgias and back pain.   Skin:  Negative for color change and rash.   Neurological:  Negative for seizures and syncope.   All other systems reviewed and are negative.          Objective       ED Triage Vitals   Temperature Pulse Blood Pressure Respirations SpO2 Patient Position - Orthostatic VS   10/04/24 2143 10/04/24 2143 10/04/24 2143 10/04/24 2143  10/04/24 2143 10/05/24 0000   (!) 97 °F (36.1 °C) (!) 117 159/77 19 100 % Sitting      Temp Source Heart Rate Source BP Location FiO2 (%) Pain Score    10/05/24 0120 10/04/24 2143 10/04/24 2143 -- 10/05/24 0120    Oral Monitor Right arm  3      Vitals      Date and Time Temp Pulse SpO2 Resp BP Pain Score FACES Pain Rating User   10/05/24 0230 -- 101 99 % 21 144/70 -- -- KL   10/05/24 0200 -- 104 100 % 22 152/72 -- -- KL   10/05/24 0130 -- 102 99 % 21 139/66 -- -- KL   10/05/24 0120 98.3 °F (36.8 °C) 101 99 % 20 141/69 3 -- [place]   10/05/24 0100 -- 107 99 % 21 144/74 -- -- KL   10/05/24 0045 -- 112 100 % 20 -- -- -- KL   10/05/24 0030 -- 103 99 % 21 136/66 -- -- KL   10/05/24 0000 -- 119 99 % 20 117/56 -- -- KL   10/04/24 2143 97 °F (36.1 °C) 117 100 % 19 159/77 -- -- ST            Physical Exam  Vitals and nursing note reviewed.   Constitutional:       General: She is not in acute distress.     Appearance: Normal appearance. She is not ill-appearing, toxic-appearing or diaphoretic.      Comments: Well-appearing female sitting in NAD   HENT:      Head: Normocephalic and atraumatic.   Eyes:      General: No scleral icterus.        Right eye: No discharge.         Left eye: No discharge.      Extraocular Movements: Extraocular movements intact.      Conjunctiva/sclera: Conjunctivae normal.   Cardiovascular:      Rate and Rhythm: Tachycardia present.      Pulses: Normal pulses.      Heart sounds: Normal heart sounds. No murmur heard.     No friction rub. No gallop.      Comments: Sinus tachycardia, no murmurs  Pulmonary:      Effort: Pulmonary effort is normal. No respiratory distress.      Breath sounds: Normal breath sounds. No stridor. No wheezing, rhonchi or rales.      Comments: No wheezes, rales, rhonchi  Abdominal:      General: Abdomen is flat. Bowel sounds are normal. There is no distension.      Palpations: Abdomen is soft.      Tenderness: There is no abdominal tenderness. There is no guarding or rebound.       "Comments: Soft, NTTP, no rigidity, guarding, rebound   Musculoskeletal:         General: No swelling. Normal range of motion.      Cervical back: Normal range of motion. No rigidity.      Right lower leg: No edema.      Left lower leg: No edema.   Skin:     General: Skin is warm and dry.      Capillary Refill: Capillary refill takes less than 2 seconds.      Coloration: Skin is not jaundiced.      Findings: No bruising or lesion.   Neurological:      General: No focal deficit present.      Mental Status: She is alert and oriented to person, place, and time. Mental status is at baseline.   Psychiatric:         Mood and Affect: Mood normal.         Behavior: Behavior normal.         Thought Content: Thought content normal.         Judgment: Judgment normal.         Results Reviewed       Procedure Component Value Units Date/Time    T4, free [[id number]]  (Abnormal) Collected: 10/04/24 2200    Lab Status: Final result Specimen: Blood from Arm, Right Updated: 10/05/24 1450     Free T4 3.23 ng/dL     Narrative:        \"Therapeutic range for patients medicated with thyroid disorders: 0.61-1.24 ng/dL.\"    HS Troponin I 2hr [[id number]]  (Normal) Collected: 10/05/24 0014    Lab Status: Final result Specimen: Blood from Arm, Right Updated: 10/05/24 0041     hs TnI 2hr <2 ng/L      Delta 2hr hsTnI <-1 ng/L     TSH, 3rd generation with Free T4 reflex [[id number]]  (Abnormal) Collected: 10/04/24 2200    Lab Status: Final result Specimen: Blood from Arm, Right Updated: 10/05/24 0012     TSH 3RD GENERATON <0.010 uIU/mL     Magnesium [[id number]]  (Abnormal) Collected: 10/04/24 2200    Lab Status: Final result Specimen: Blood from Arm, Right Updated: 10/04/24 2335     Magnesium 1.8 mg/dL     B-Type Natriuretic Peptide(BNP) [[id number]]  (Normal) Collected: 10/04/24 2200    Lab Status: Final result Specimen: Blood from Arm, Right Updated: 10/04/24 2305     BNP 17 pg/mL     POCT pregnancy, urine [[id number]]  (Normal) Resulted: 10/04/24 " 2257    Lab Status: Final result Updated: 10/04/24 2257     EXT Preg Test, Ur Negative     Control Valid    HS Troponin 0hr (reflex protocol) [[id number]]  (Normal) Collected: 10/04/24 2200    Lab Status: Final result Specimen: Blood from Arm, Right Updated: 10/04/24 2229     hs TnI 0hr 3 ng/L     Comprehensive metabolic panel [[id number]]  (Abnormal) Collected: 10/04/24 2200    Lab Status: Final result Specimen: Blood from Arm, Right Updated: 10/04/24 2221     Sodium 139 mmol/L      Potassium 3.7 mmol/L      Chloride 105 mmol/L      CO2 25 mmol/L      ANION GAP 9 mmol/L      BUN 11 mg/dL      Creatinine 0.64 mg/dL      Glucose 121 mg/dL      Calcium 9.5 mg/dL      AST 40 U/L      ALT 71 U/L      Alkaline Phosphatase 63 U/L      Total Protein 7.3 g/dL      Albumin 4.4 g/dL      Total Bilirubin 0.40 mg/dL      eGFR 126 ml/min/1.73sq m     Narrative:      National Kid[employer] Disease Foundation guidelines for Chronic Kidney Disease (CKD):     Stage 1 with normal or high GFR (GFR > 90 mL/min/1.73 square meters)    Stage 2 Mild CKD (GFR = 60-89 mL/min/1.73 square meters)    Stage 3A Moderate CKD (GFR = 45-59 mL/min/1.73 square meters)    Stage 3B Moderate CKD (GFR = 30-44 mL/min/1.73 square meters)    Stage 4 Severe CKD (GFR = 15-29 mL/min/1.73 square meters)    Stage 5 End Stage CKD (GFR <15 mL/min/1.73 square meters)  Note: GFR calculation is accurate only with a steady state creatinine    CBC and differential [[id number]]  (Abnormal) Collected: 10/04/24 2200    Lab Status: Final result Specimen: Blood from Arm, Right Updated: 10/04/24 2205     WBC 6.00 Thousand/uL      RBC 4.47 Million/uL      Hemoglobin 12.0 g/dL      Hematocrit 38.5 %      MCV 86 fL      MCH 26.8 pg      MCHC 31.2 g/dL      RDW 11.9 %      MPV 9.3 fL      Platelets 296 Thousands/uL      nRBC 0 /100 WBCs      Segmented % 41 %      Immature Grans % 0 %      Lymphocytes % 42 %      Monocytes % 15 %      Eosinophils Relative 2 %      Basophils Relative 0 %       Absolute Neutrophils 2.47 Thousands/µL      Absolute Immature Grans 0.01 Thousand/uL      Absolute Lymphocytes 2.52 Thousands/µL      Absolute Monocytes 0.88 Thousand/µL      Eosinophils Absolute 0.11 Thousand/µL      Basophils Absolute 0.01 Thousands/µL             CTA ED chest PE Study   Final Interpretation by Oscar Salazar MD (10/05 0154)      No evidence of pulmonary embolism.         Workstation performed: [id number]         XR chest 1 view portable   ED Interpretation by Anais Aleman DO (10/04 2246)   No infiltrate, effusion, ptx      Final Interpretation by Satya Cantor MD (10/05 0743)      Bibasilar atelectasis noted. Otherwise no acute cardiopulmonary abnormalities.      Workstation performed: [id number]             Procedures    ED Medication and Procedure Management   None     There are no discharge medications for this patient.      ED SEPSIS DOCUMENTATION   Time reflects when diagnosis was documented in both MDM as applicable and the Disposition within this note       Time User Action Codes Description Comment    10/5/2024  2:51 AM Anais Aleman Add [R00.2] Palpitations     10/5/2024  2:52 AM Anais Aleman Add [R79.89] Low thyroid stimulating hormone (TSH) level     10/5/2024  2:52 AM Anais Aleman Add [R03.0] Elevated blood pressure reading     10/5/2024  2:54 AM Anais Aleman Add [E83.42] Hypomagnesemia     10/5/2024  2:54 AM Anais Aleman Add [R74.01] Transaminitis                  Anais Aleman DO  10/06/24 0250

## 2024-10-23 ENCOUNTER — OFFICE VISIT (OUTPATIENT)
Dept: FAMILY MEDICINE CLINIC | Age: 89
End: 2024-10-23

## 2024-10-23 VITALS
HEART RATE: 81 BPM | OXYGEN SATURATION: 99 % | BODY MASS INDEX: 31.54 KG/M2 | SYSTOLIC BLOOD PRESSURE: 138 MMHG | WEIGHT: 178 LBS | HEIGHT: 63 IN | DIASTOLIC BLOOD PRESSURE: 70 MMHG

## 2024-10-23 DIAGNOSIS — R53.82 CHRONIC FATIGUE: ICD-10-CM

## 2024-10-23 DIAGNOSIS — M79.604 ACUTE LEG PAIN, RIGHT: ICD-10-CM

## 2024-10-23 DIAGNOSIS — R06.09 DOE (DYSPNEA ON EXERTION): ICD-10-CM

## 2024-10-23 DIAGNOSIS — M79.89 RIGHT LEG SWELLING: ICD-10-CM

## 2024-10-23 DIAGNOSIS — I38 VALVULAR HEART DISEASE: ICD-10-CM

## 2024-10-23 DIAGNOSIS — R22.41 LEG MASS, RIGHT: ICD-10-CM

## 2024-10-23 DIAGNOSIS — Z00.00 MEDICARE ANNUAL WELLNESS VISIT, SUBSEQUENT: Primary | ICD-10-CM

## 2024-10-23 DIAGNOSIS — Z23 NEEDS FLU SHOT: ICD-10-CM

## 2024-10-23 DIAGNOSIS — E78.2 MIXED HYPERLIPIDEMIA: ICD-10-CM

## 2024-10-23 DIAGNOSIS — R00.2 PALPITATIONS: ICD-10-CM

## 2024-10-23 PROBLEM — D72.829 ELEVATED WHITE BLOOD CELL COUNT: Status: RESOLVED | Noted: 2018-01-10 | Resolved: 2024-10-23

## 2024-10-23 LAB
TSH REFLEX FT4: 1.48 MIU/ML (ref 0.49–4.67)
VITAMIN B-12: 947 PG/ML (ref 239–931)

## 2024-10-23 ASSESSMENT — PATIENT HEALTH QUESTIONNAIRE - PHQ9
9. THOUGHTS THAT YOU WOULD BE BETTER OFF DEAD, OR OF HURTING YOURSELF: NOT AT ALL
2. FEELING DOWN, DEPRESSED OR HOPELESS: NOT AT ALL
SUM OF ALL RESPONSES TO PHQ QUESTIONS 1-9: 4
1. LITTLE INTEREST OR PLEASURE IN DOING THINGS: NOT AT ALL
SUM OF ALL RESPONSES TO PHQ QUESTIONS 1-9: 4
SUM OF ALL RESPONSES TO PHQ9 QUESTIONS 1 & 2: 0
4. FEELING TIRED OR HAVING LITTLE ENERGY: NEARLY EVERY DAY
8. MOVING OR SPEAKING SO SLOWLY THAT OTHER PEOPLE COULD HAVE NOTICED. OR THE OPPOSITE, BEING SO FIGETY OR RESTLESS THAT YOU HAVE BEEN MOVING AROUND A LOT MORE THAN USUAL: NOT AT ALL
SUM OF ALL RESPONSES TO PHQ QUESTIONS 1-9: 4
SUM OF ALL RESPONSES TO PHQ QUESTIONS 1-9: 4
7. TROUBLE CONCENTRATING ON THINGS, SUCH AS READING THE NEWSPAPER OR WATCHING TELEVISION: NOT AT ALL
6. FEELING BAD ABOUT YOURSELF - OR THAT YOU ARE A FAILURE OR HAVE LET YOURSELF OR YOUR FAMILY DOWN: NOT AT ALL
5. POOR APPETITE OR OVEREATING: NOT AT ALL
3. TROUBLE FALLING OR STAYING ASLEEP: SEVERAL DAYS

## 2024-10-23 NOTE — PROGRESS NOTES
Medicare Annual Wellness Visit    Matilde Desai is here for Medicare AWV (Pt would like to talk about lowering her medications. Pt states that in the morning she feels really weak.) and Leg Pain (Pt states that Monday she started to notice swelling in both feet/legs. Pt states that she is having pain behind her right leg. )    Assessment & Plan  1. Right leg pain.  The patient's right leg pain is likely due to a Baker's cyst or a hematoma. An ultrasound of the right leg was performed and did not show any blood clots but revealed a mass measuring approximately 3 inches. A CT scan of the right lower extremity without contrast will be ordered to further investigate the mass. She should continue wearing compression hose during the day and remove them at night. If the CT scan is negative for a blood clot, the pain is likely muscle-related. In this case, she should continue gentle movement, apply ice or heat as preferred, and take Tylenol for pain relief. If there is no improvement over the next 1 to 2 weeks, physical therapy may be considered. She should notify the office if she notices any redness, numbness in her foot, or worsening pain.    2. Chronic Lymphocytic Leukemia (CLL).  Her white blood cell count is elevated at 48, while her red blood cell count remains within normal limits. Kidney and liver function tests from September 2024 were satisfactory, with only one liver test slightly outside the normal range. No immediate treatment is required as her condition is stable.    3. Fatigue.  A B12 deficiency could be contributing to her fatigue. Blood work will be conducted to assess her B12 levels. Additionally, a thyroid function test will be performed. An EKG will be done today, and an echocardiogram will be scheduled. A Holter monitor will be arranged to monitor her heart rate over a 2-day period to rule out any unusual heart rhythms that could be causing fatigue.    4. Health Maintenance.  She is advised to receive

## 2024-10-23 NOTE — PROGRESS NOTES
Have you had an allergic reaction to the flu (influenza) shot? no  Are you allergic to eggs or any component of the flu vaccine? no  Do you have a history of Guillain-[place] Syndrome (GBS), which is paralysis after receiving the flu vaccine? no  Are you feeling well today? yes  Flu vaccine given as ordered.  Patient tolerated it well.  No questions re: VIS information.    After obtaining consent, and per orders of Minoo Mendez MD, injection of FLU VACCINE given in Left deltoid by Myla Sheehan MA. Patient tolerated well.  Patient instructed to remain in clinic for 20 minutes afterwards, and to report any adverse reaction immediately.

## 2024-10-24 ENCOUNTER — TELEPHONE (OUTPATIENT)
Dept: FAMILY MEDICINE CLINIC | Age: 89
End: 2024-10-24

## 2024-10-24 NOTE — TELEPHONE ENCOUNTER
Spoke with radiology personally.  They feel very confident that it is either the cyst or a resolving hematoma meaning clotting in the leg.  They do not feel that further imaging is going to be beneficial in clarifying that you and that given time the symptoms should Simplot side.  Based on that I do feel it is appropriate to just continue to watch the leg with the compression socks rest ice or heat as needed and elevation.  If this is not improving over the next 1 to 2 months then we may need to consider further imaging.  Please let Matilde know that we are not going to schedule the CT at this time.

## 2024-12-04 ENCOUNTER — OFFICE VISIT (OUTPATIENT)
Dept: FAMILY MEDICINE CLINIC | Age: 88
End: 2024-12-04
Payer: MEDICARE

## 2024-12-04 VITALS
HEART RATE: 75 BPM | SYSTOLIC BLOOD PRESSURE: 130 MMHG | WEIGHT: 176 LBS | DIASTOLIC BLOOD PRESSURE: 76 MMHG | OXYGEN SATURATION: 97 % | BODY MASS INDEX: 31.18 KG/M2

## 2024-12-04 DIAGNOSIS — N18.31 STAGE 3A CHRONIC KIDNEY DISEASE (HCC): ICD-10-CM

## 2024-12-04 DIAGNOSIS — R06.09 DOE (DYSPNEA ON EXERTION): ICD-10-CM

## 2024-12-04 DIAGNOSIS — I10 PRIMARY HYPERTENSION: Primary | ICD-10-CM

## 2024-12-04 DIAGNOSIS — R53.1 WEAKNESS: ICD-10-CM

## 2024-12-04 PROCEDURE — 99212 OFFICE O/P EST SF 10 MIN: CPT | Performed by: FAMILY MEDICINE

## 2024-12-04 NOTE — PROGRESS NOTES
vaccine  Aged Out    Hib vaccine  Aged Out    Polio vaccine  Aged Out    Meningococcal (ACWY) vaccine  Aged Out    DEXA (modify frequency per FRAX score)  Discontinued         Review of Systems    Objective:     /76 (Site: Left Upper Arm, Position: Sitting, Cuff Size: Medium Adult)   Pulse 75   Wt 79.8 kg (176 lb)   SpO2 97%   BMI 31.18 kg/m²     Physical Exam  Lungs were auscultated.  Heart was examined.    Vital Signs  Blood pressure is 130/76.    Physical Exam  Vitals and nursing note reviewed.   Constitutional:       General: She is not in acute distress.     Appearance: Normal appearance. She is not ill-appearing or diaphoretic.   HENT:      Head: Normocephalic.      Right Ear: Tympanic membrane and external ear normal.      Left Ear: Tympanic membrane and external ear normal.      Nose: Nose normal.      Mouth/Throat:      Pharynx: Uvula midline.   Eyes:      General: No scleral icterus.        Right eye: No discharge.         Left eye: No discharge.      Conjunctiva/sclera: Conjunctivae normal.   Cardiovascular:      Rate and Rhythm: Normal rate and regular rhythm.      Heart sounds: Normal heart sounds.   Pulmonary:      Effort: Pulmonary effort is normal. No respiratory distress.      Breath sounds: Normal breath sounds. No stridor. No wheezing or rales.   Chest:      Chest wall: Tenderness present.       Abdominal:      General: Bowel sounds are normal.      Palpations: Abdomen is soft.       Musculoskeletal:         General: No tenderness. Normal range of motion.      Cervical back: Normal range of motion and neck supple.      Right lower leg: No edema.      Left lower leg: No edema.   Skin:     General: Skin is warm and dry.      Capillary Refill: Capillary refill takes less than 2 seconds.   Neurological:      General: No focal deficit present.      Mental Status: She is alert and oriented to person, place, and time.   Psychiatric:         Mood and Affect: Mood normal.         Speech: Speech

## 2024-12-26 ENCOUNTER — TELEPHONE (OUTPATIENT)
Dept: FAMILY MEDICINE CLINIC | Age: 88
End: 2024-12-26

## 2024-12-26 DIAGNOSIS — I10 PRIMARY HYPERTENSION: ICD-10-CM

## 2024-12-26 DIAGNOSIS — I38 VALVULAR HEART DISEASE: Primary | ICD-10-CM

## 2024-12-26 NOTE — TELEPHONE ENCOUNTER
Patient called stating that her BP was super high, so she starting taking a medication she had from a while ago (Hydro - something, she couldn't remember since its been a while) She stated that she had a couple of nurses in the family tell her to take it, and since she started taking it her BP's have been lower. She just wanted to know what Dr. Mendez thought, and she just wanted to update her as well.

## 2024-12-26 NOTE — TELEPHONE ENCOUNTER
See what her BP has been running before and after starting this.  After restarting we do need to recheck the labs about 2 weeks later to ensure not dropping her potassium level and make sure getting enough water. Labs ordered.  Hydrochlorathiazide.     unknown - switched to new provider from Dr Hoskins recently, son cannot recall name of new pmd

## 2024-12-30 NOTE — TELEPHONE ENCOUNTER
Spoke with patient and she states that last Tuesday 12/24 is when she started taking the Hydrochlorothiazide. Patient states that before she started the medication again her BP was 194/83 and 186/81. Patient states that since she started the Hydrochlorothiazide her BP was been 152/77, 172/70, 164/80. Patient also states that in the morning she is always so tired and weak, this has been going on for sometime.

## 2024-12-31 NOTE — TELEPHONE ENCOUNTER
Noted BP's  COntinue with the hydrochlorothiazode and the labs recheck as ordered.  Should schedule BP follow up appt in 2-3 weeks.

## 2025-01-07 ENCOUNTER — TELEPHONE (OUTPATIENT)
Dept: FAMILY MEDICINE CLINIC | Age: 89
End: 2025-01-07

## 2025-01-07 NOTE — TELEPHONE ENCOUNTER
Called patient to reschedule her 1/16/25 appt.  Rescheduled to 1/20/25.  She said she is not feeling good.  Weak, dizzy and upset stomach.  She said a friend/neighbor had this for 3 weeks and said if she is not feeling any better, there is no way she'll be able to come in.  She has not gotten the lab test done to check her potassium.  Just wanted you to be aware in case her symptoms could be related to BP or hypokalemia.

## 2025-01-07 NOTE — TELEPHONE ENCOUNTER
Please check on Matilde in the next few days.  If she is not feeling better then we may want to move her UP sooner.  Not sure what all she has going on......

## 2025-01-08 NOTE — TELEPHONE ENCOUNTER
Spoke with patient- she states she is feeling much better today. She states people around her had been sick and she thinks that she caught it. She will call if her symptoms return or if she needs something sooner that her appointment.

## 2025-01-22 DIAGNOSIS — I82.491 ACUTE DEEP VEIN THROMBOSIS (DVT) OF OTHER SPECIFIED VEIN OF RIGHT LOWER EXTREMITY (HCC): ICD-10-CM

## 2025-01-22 NOTE — TELEPHONE ENCOUNTER
Matilde Desai is requesting a refill on the following medication(s):  Requested Prescriptions     Pending Prescriptions Disp Refills    apixaban (ELIQUIS) 5 MG TABS tablet [Pharmacy Med Name: Eliquis Oral Tablet 5 MG] 180 tablet 3     Sig: TAKE 1 TABLET TWICE DAILY    carvedilol (COREG) 6.25 MG tablet [Pharmacy Med Name: Carvedilol Oral Tablet 6.25 MG] 180 tablet 3     Sig: TAKE 1 TABLET TWICE DAILY       Last Visit Date (If Applicable):  12/4/2024    Next Visit Date:    1/29/2025

## 2025-01-23 RX ORDER — CARVEDILOL 6.25 MG/1
TABLET ORAL
Qty: 180 TABLET | Refills: 3 | Status: SHIPPED | OUTPATIENT
Start: 2025-01-23

## 2025-01-23 RX ORDER — HYDROCHLOROTHIAZIDE 25 MG/1
25 TABLET ORAL DAILY
Qty: 90 TABLET | Refills: 1 | Status: SHIPPED | OUTPATIENT
Start: 2025-01-23

## 2025-01-23 NOTE — TELEPHONE ENCOUNTER
Matilde Desai is requesting a refill on the following medication(s):  Requested Prescriptions     Pending Prescriptions Disp Refills    hydroCHLOROthiazide (HYDRODIURIL) 25 MG tablet [Pharmacy Med Name: HYDROCHLOROTHIAZIDE 25MG TAB]  0       Last Visit Date (If Applicable):  12/4/2024    Next Visit Date:    1/29/2025

## 2025-01-29 ENCOUNTER — OFFICE VISIT (OUTPATIENT)
Dept: FAMILY MEDICINE CLINIC | Age: 89
End: 2025-01-29
Payer: MEDICARE

## 2025-01-29 VITALS
BODY MASS INDEX: 31.18 KG/M2 | WEIGHT: 176 LBS | SYSTOLIC BLOOD PRESSURE: 158 MMHG | HEART RATE: 77 BPM | DIASTOLIC BLOOD PRESSURE: 86 MMHG | OXYGEN SATURATION: 98 %

## 2025-01-29 DIAGNOSIS — F32.1 CURRENT MODERATE EPISODE OF MAJOR DEPRESSIVE DISORDER WITHOUT PRIOR EPISODE (HCC): ICD-10-CM

## 2025-01-29 DIAGNOSIS — R11.0 NAUSEA: ICD-10-CM

## 2025-01-29 DIAGNOSIS — D50.0 IRON DEFICIENCY ANEMIA DUE TO CHRONIC BLOOD LOSS: ICD-10-CM

## 2025-01-29 DIAGNOSIS — N18.31 STAGE 3A CHRONIC KIDNEY DISEASE (HCC): ICD-10-CM

## 2025-01-29 DIAGNOSIS — I10 PRIMARY HYPERTENSION: ICD-10-CM

## 2025-01-29 DIAGNOSIS — R10.9 ABDOMINAL DISCOMFORT: ICD-10-CM

## 2025-01-29 DIAGNOSIS — K59.04 CHRONIC IDIOPATHIC CONSTIPATION: ICD-10-CM

## 2025-01-29 DIAGNOSIS — C91.10 CHRONIC LYMPHATIC LEUKEMIA (HCC): Primary | ICD-10-CM

## 2025-01-29 DIAGNOSIS — E78.2 MIXED HYPERLIPIDEMIA: ICD-10-CM

## 2025-01-29 LAB
ALBUMIN/GLOBULIN RATIO: 1.9 G/DL
ALBUMIN: 4.7 G/DL (ref 3.5–5)
ALP BLD-CCNC: 93 UNITS/L (ref 38–126)
ALT SERPL-CCNC: 44 UNITS/L (ref 4–35)
ANION GAP SERPL CALCULATED.3IONS-SCNC: 8.8 MMOL/L (ref 3–11)
AST SERPL-CCNC: 37 UNITS/L (ref 14–36)
BANDED NEUTROPHILS RELATIVE PERCENT: 0 % (ref 0–5)
BASOPHILS %. MANUAL COUNT: 0 % (ref 0–1)
BILIRUB SERPL-MCNC: 0.6 MG/DL (ref 0.2–1.3)
BUN BLDV-MCNC: 23 MG/DL (ref 7–17)
CALCIUM SERPL-MCNC: 10.4 MG/DL (ref 8.4–10.2)
CHLORIDE BLD-SCNC: 103 MMOL/L (ref 98–120)
CO2: 32 MMOL/L (ref 22–31)
CREAT SERPL-MCNC: 0.9 MG/DL (ref 0.5–1)
EOSINOPHILS % MANUAL COUNT: 0 (ref 0–10)
GFR, ESTIMATED: > 60
GLOBULIN: 2.5 G/DL
GLUCOSE: 113 MG/DL (ref 65–105)
HCT VFR BLD CALC: 43.3 % (ref 37–47)
HEMOGLOBIN: 13.7 G/DL (ref 12–16)
HYPOCHROMIA: ABNORMAL
IRON % SATURATION: 33 % (ref 15–38)
IRON: 111 MG/DL (ref 37–170)
LYMPHOCYTES % MANUAL COUNT: 88 % (ref 21–51)
MCH RBC QN AUTO: 29.6 PG (ref 28.5–32.5)
MCHC RBC AUTO-ENTMCNC: 31.5 G/DL (ref 32–37)
MCV RBC AUTO: 93.8 FL (ref 80–94)
MONOCYTES RELATIVE PERCENT: 2 % (ref 2–9)
NEUTROPHILS %. MANUAL COUNT: 10 % (ref 42–75)
PDW BLD-RTO: 12.5 % (ref 8.5–15.5)
PLATELET # BLD: 197.7 THOU/MM3 (ref 130–400)
POTASSIUM SERPL-SCNC: 3.8 MMOL/L (ref 3.6–5)
RBC # BLD: 4.62 M/UL (ref 4.2–5.4)
SODIUM BLD-SCNC: 140 MMOL/L (ref 135–145)
TOTAL IRON BINDING CAPACITY: 341 MG/DL (ref 261–497)
TOTAL PROTEIN: 7.2 G/DL (ref 6.3–8.2)
WBC # BLD: 44.8 THOU/ML3 (ref 4.8–10.8)

## 2025-01-29 PROCEDURE — 99213 OFFICE O/P EST LOW 20 MIN: CPT | Performed by: FAMILY MEDICINE

## 2025-01-29 SDOH — ECONOMIC STABILITY: FOOD INSECURITY: WITHIN THE PAST 12 MONTHS, YOU WORRIED THAT YOUR FOOD WOULD RUN OUT BEFORE YOU GOT MONEY TO BUY MORE.: NEVER TRUE

## 2025-01-29 SDOH — ECONOMIC STABILITY: FOOD INSECURITY: WITHIN THE PAST 12 MONTHS, THE FOOD YOU BOUGHT JUST DIDN'T LAST AND YOU DIDN'T HAVE MONEY TO GET MORE.: NEVER TRUE

## 2025-01-29 ASSESSMENT — PATIENT HEALTH QUESTIONNAIRE - PHQ9
SUM OF ALL RESPONSES TO PHQ9 QUESTIONS 1 & 2: 2
3. TROUBLE FALLING OR STAYING ASLEEP: MORE THAN HALF THE DAYS
SUM OF ALL RESPONSES TO PHQ QUESTIONS 1-9: 7
7. TROUBLE CONCENTRATING ON THINGS, SUCH AS READING THE NEWSPAPER OR WATCHING TELEVISION: NOT AT ALL
8. MOVING OR SPEAKING SO SLOWLY THAT OTHER PEOPLE COULD HAVE NOTICED. OR THE OPPOSITE, BEING SO FIGETY OR RESTLESS THAT YOU HAVE BEEN MOVING AROUND A LOT MORE THAN USUAL: NOT AT ALL
1. LITTLE INTEREST OR PLEASURE IN DOING THINGS: SEVERAL DAYS
2. FEELING DOWN, DEPRESSED OR HOPELESS: SEVERAL DAYS
5. POOR APPETITE OR OVEREATING: SEVERAL DAYS
SUM OF ALL RESPONSES TO PHQ QUESTIONS 1-9: 7
SUM OF ALL RESPONSES TO PHQ QUESTIONS 1-9: 7
6. FEELING BAD ABOUT YOURSELF - OR THAT YOU ARE A FAILURE OR HAVE LET YOURSELF OR YOUR FAMILY DOWN: NOT AT ALL
SUM OF ALL RESPONSES TO PHQ QUESTIONS 1-9: 7
4. FEELING TIRED OR HAVING LITTLE ENERGY: MORE THAN HALF THE DAYS
9. THOUGHTS THAT YOU WOULD BE BETTER OFF DEAD, OR OF HURTING YOURSELF: NOT AT ALL

## 2025-01-29 NOTE — PROGRESS NOTES
[place] [address] [facility] [address]  [place] [address]  Dept: [phone]  Dept Fax:[phone]    Matilde Desai is a 89 y.o. female who presents today for her medical conditions/complaints as notedbelow.        Assessment/Plan:     Assessment & Plan  1. Nausea.  The nausea could be attributed to her iron supplementation or irregular bowel movements. She reports that the nausea has worsened over the past few days and is now present throughout the day. She is advised to consume prunes or apples daily and maintain adequate hydration to promote regular bowel movements. A blood test will be ordered to assess her iron levels and determine the necessity of continued iron supplementation. A CT scan of the abdomen will also be conducted to rule out any other potential causes and to evaluate the spleen and liver for enlargement due to CLL.  If iron stores are adequate then will trial stopping the iron to see if this helps decrease her nausea.    2. Chronic Lymphocytic Leukemia (CLL).  Her fatigue and nausea may be related to her CLL. She has an appointment with a new oncologist on 03/24/2025. A complete blood count (CBC) will be performed today to monitor her white and red blood cell counts. The results will be forwarded to her oncologist for further evaluation and management.    3. Hypertension.  Her blood pressure is slightly elevated today, but it is within acceptable limits. She has resumed taking hydrochlorothiazide after a brief discontinuation. No changes to her current antihypertensive regimen are recommended at this time.      Assessment & Plan  Chronic lymphatic leukemia (HCC)    Orders:    CBC with Auto Differential; Future    Current moderate episode of major depressive disorder without prior episode (HCC)  Mood stable- more down right now since not feeling well but only minimally  Stage 3a chronic kidney disease (HCC)    Orders:    Comprehensive Metabolic Panel;

## 2025-01-30 DIAGNOSIS — K59.04 CHRONIC IDIOPATHIC CONSTIPATION: ICD-10-CM

## 2025-01-30 DIAGNOSIS — R10.9 ABDOMINAL DISCOMFORT: ICD-10-CM

## 2025-01-30 DIAGNOSIS — R11.0 NAUSEA: ICD-10-CM

## 2025-01-31 NOTE — RESULT ENCOUNTER NOTE
Please notify patient the labs/results are very stable.  No changes at this time.  The iron studies are also good -- please have her try going without her iron to see if this helps with her stomach upset.

## 2025-03-03 ENCOUNTER — TELEPHONE (OUTPATIENT)
Dept: FAMILY MEDICINE CLINIC | Age: 89
End: 2025-03-03

## 2025-03-03 DIAGNOSIS — R35.0 FREQUENT URINATION: Primary | ICD-10-CM

## 2025-03-03 LAB
BACTERIA, URINE: ABNORMAL /HPF
BILIRUBIN, URINE: NEGATIVE
BLOOD, URINE: ABNORMAL
CASTS UA: ABNORMAL /LPF
CLARITY, UA: CLEAR
COLOR, UA: YELLOW
CRYSTALS, UA: ABNORMAL
GLUCOSE URINE: NEGATIVE MG/DL
KETONES, URINE: NEGATIVE MG/DL
LEUKOCYTE ESTERASE, URINE: NEGATIVE
NITRITE, URINE: NEGATIVE
PH, URINE: 7 (ref 5–8.5)
PROTEIN UA: NEGATIVE MG/DL
RBC URINE: ABNORMAL /HPF (ref 0–2)
SPECIFIC GRAVITY UA: 1.01 E.U./DL (ref 1–1.03)
SQUAMOUS EPITHELIAL: ABNORMAL /HPF
UROBILINOGEN, URINE: 0.2 MG/DL (ref 0.2–1)
WBC URINE: ABNORMAL /HPF (ref 0–4)

## 2025-03-03 NOTE — TELEPHONE ENCOUNTER
OK for UA, many other illness going around so if other sx may need to be evaluated. Push fluids!!!

## 2025-03-03 NOTE — TELEPHONE ENCOUNTER
Renuka called wanting to know if she can pk up order and container to take and have Matilde checked for a UTI, symptoms are stomachache, weak, urinating frequently. Renuka can be reached at [phone]

## 2025-03-04 ENCOUNTER — TELEPHONE (OUTPATIENT)
Dept: FAMILY MEDICINE CLINIC | Age: 89
End: 2025-03-04

## 2025-03-05 NOTE — TELEPHONE ENCOUNTER
Urinalysis does not show an infection. No antibiotic is needed.  If she is continuing to not be herself then she may need seen.

## 2025-03-12 ENCOUNTER — OFFICE VISIT (OUTPATIENT)
Dept: FAMILY MEDICINE CLINIC | Age: 89
End: 2025-03-12
Payer: MEDICARE

## 2025-03-12 VITALS
HEART RATE: 68 BPM | WEIGHT: 177 LBS | DIASTOLIC BLOOD PRESSURE: 70 MMHG | SYSTOLIC BLOOD PRESSURE: 132 MMHG | OXYGEN SATURATION: 97 % | BODY MASS INDEX: 31.35 KG/M2

## 2025-03-12 DIAGNOSIS — K80.20 CALCULUS OF GALLBLADDER WITHOUT CHOLECYSTITIS WITHOUT OBSTRUCTION: ICD-10-CM

## 2025-03-12 DIAGNOSIS — I10 PRIMARY HYPERTENSION: Primary | ICD-10-CM

## 2025-03-12 DIAGNOSIS — R11.0 NAUSEA: ICD-10-CM

## 2025-03-12 PROCEDURE — 99213 OFFICE O/P EST LOW 20 MIN: CPT | Performed by: FAMILY MEDICINE

## 2025-03-12 RX ORDER — OMEPRAZOLE 40 MG/1
40 CAPSULE, DELAYED RELEASE ORAL
Qty: 30 CAPSULE | Refills: 0 | Status: SHIPPED | OUTPATIENT
Start: 2025-03-12

## 2025-03-12 NOTE — PROGRESS NOTES
distress.     Appearance: Normal appearance. She is not ill-appearing or diaphoretic.   HENT:      Head: Normocephalic.      Right Ear: Tympanic membrane and external ear normal.      Left Ear: Tympanic membrane and external ear normal.      Nose: Nose normal.      Mouth/Throat:      Pharynx: Uvula midline.   Eyes:      General: No scleral icterus.        Right eye: No discharge.         Left eye: No discharge.      Conjunctiva/sclera: Conjunctivae normal.   Cardiovascular:      Rate and Rhythm: Normal rate and regular rhythm.      Heart sounds: Normal heart sounds.   Pulmonary:      Effort: Pulmonary effort is normal. No respiratory distress.      Breath sounds: Normal breath sounds. No stridor. No wheezing or rales.   Chest:      Chest wall: No tenderness.   Abdominal:      General: Bowel sounds are normal.      Palpations: Abdomen is soft. There is no mass.      Tenderness: There is no abdominal tenderness.   Musculoskeletal:         General: No tenderness. Normal range of motion.      Cervical back: Normal range of motion and neck supple.      Right lower leg: No edema.      Left lower leg: No edema.   Skin:     General: Skin is warm and dry.      Capillary Refill: Capillary refill takes less than 2 seconds.   Neurological:      General: No focal deficit present.      Mental Status: She is alert and oriented to person, place, and time.   Psychiatric:         Speech: Speech normal.         Behavior: Behavior normal.         Thought Content: Thought content normal.         Judgment: Judgment normal.      Comments: Mildly anxious               Multiple labs and other testing may have been ordered which may not be completely evident from the above note due to system interface incompatibilities.     Patient given educational materials - see patientinstructions.  Discussed use, benefit, and side effects of prescribed medications.  All patient questions answered.  Pt voiced understanding. Reviewed health maintenance.

## 2025-03-20 ENCOUNTER — TELEPHONE (OUTPATIENT)
Dept: FAMILY MEDICINE CLINIC | Age: 89
End: 2025-03-20

## 2025-03-21 ENCOUNTER — RESULTS FOLLOW-UP (OUTPATIENT)
Dept: FAMILY MEDICINE CLINIC | Age: 89
End: 2025-03-21

## 2025-03-21 DIAGNOSIS — K80.20 CALCULUS OF GALLBLADDER WITHOUT CHOLECYSTITIS WITHOUT OBSTRUCTION: Primary | ICD-10-CM

## 2025-03-21 NOTE — RESULT ENCOUNTER NOTE
Physical Therapy    Visit Type: initial evaluation and treatment    Relevant History/Co-morbidities: Admit with weakness. Pt recently DC from OSH  for heart failure exacerbation and STEPHANIE.     PMH: arthritis, ADHD, fibromyalgia/chronic pain, HFpEF, hypertension, dyslipidemia, thyroid disease, depression, fatty liver, anemia, paroxysmal atrial fibrillation, hyponatremia, hypokalemia    SUBJECTIVE  Patient agreed to participate in therapy this date.  RN in agreement to work with patient for therapy session.  Patient verbally agrees to allow the following to be present during session: spouse  \"When I get better, I'll go to a rehab program like [facility] and walk.\"  Patient has been hospitalized or in a skilled nursing facility in the last 30 days.    Pain     Location: Generalized    At onset of session (out of 10): 9     OBJECTIVE     Cognitive Status   Level of Consciousness   - alert  Affect/Behavior    - flat and anxious  Orientation    - Oriented to: person, place, time and situation  Functional Communication   - Overall Status: within functional limits   - Forms of Communication: verbal  Attention Span    - Attention impairment: fatigue and internal factors  Following Direction   - follows one step commands consistently  Transition Between Tasks   - transitions without difficulty  Memory   - appears intact  Safety Awareness/Insight   - decreased awareness of need for safety  Awareness of Deficits   - decreased awareness of deficits  Pt displaying self limiting behavior.     Vitals:  Blood Pressure (mmhg):      - Supine: 123/70, symptomatic  After transfers:  BP sittin/71, dizzy        Range of Motion (ROM)   (degrees unless noted; active unless noted; norms in ( ); negative=lacking to 0, positive=beyond 0)  WFL: LLE, RLE    Strength  (out of 5 unless noted, standard test position unless noted)   WFL: LLE, RLE      Sitting Balance  (ELISE = base of support)  Static      - Trial 1 details: stand by assist, with  Please notify Matilde that her USn did show the gallstones.  I have put in her referral to general surgery to evaluate if they believe these could be contributing to her daily nausea.   back unsupported, with double LE support and with double UE support  Pt with forward flexed posture and cues for extending head and neck. Pt reports \"I'm tired and I can't do it\" Eventually pt was able to align posture however noted dec tolerance.     Standing Balance  (ELISE = base of support)  Firm Surface: Double Leg      - Static, Eyes Open       - Trial 1 details: with double UE support and contact guard       Bed Mobility  - Rolling left: stand by assist, with verbal cues  - Side-lying to sit: contact guard/touching/steadying assist, with verbal cues  Transfers  Assistive devices: gait belt, 2-wheeled walker  - Sit to stand: contact guard/touching/steadying assist  - Stand to sit: contact guard/touching/steadying assist  - Stand pivot: minimal assist  Pt demo'd STS x 3 with c/o dizziness and BP WNL. Encouraged pt to attempt short distance ambulation but declined 2/2 dizziness and feeling weak.        Interventions     Training provided: bed mobility training, body mechanics, breathing/relaxation, positioning, safety training, use of assistive device and transfer training    Skilled input: Verbal instruction/cues, tactile instruction/cues and posture correction  Verbal Consent: Writer verbally educated and received verbal consent for hand placement, positioning of patient, and techniques to be performed today from patient for clothing adjustments for techniques, hand placement and palpation for techniques and therapist position for techniques as described above and how they are pertinent to the patient's plan of care.         Education:   - Present and not ready to learn: patient  Education provided during session:  - Results of above outlined education: Needs reinforcement    ASSESSMENT   Patient will benefit from skilled therapy to address listed impairments and functional limitations.  Interferring components: decreased activity tolerance    Discharge needs based on today's assessment:   - Current level of  function: significantly below baseline level of function   - Therapy needs at discharge: therapy 1-3 times per week (Pt refusing SO - DC at WC level (pt has been WC bound in the last month))   - Activities of daily living (ADLs) requiring support at discharge: bed mobility, transfers, ambulation and stairs   - Impairments that require further therapy intervention: activity tolerance, balance, pain, strength and safety awareness    AM-PAC  - Generalized Prior Level of Function: Needs a little help ([facility] 12-21)       Key: MOD A=moderate assistance, IND/MOD I=independent/modified independent  - Generalized Current Level of Function     - Current Mobility Score: 14       AM-PAC Scoring Key= >21 Modified Independent; 20-21 Supervision; 18-19 Minimal assist; 13-17 Moderate assist; 9-12 Max assist; <9 Total assist    [facility] Cognition Screen:    1. Following/understanding a 10 to 15 minute speech or presentation (e.g., lesson at a place of [employer], guest lecturer at a senior center?  None (4)    2. Understanding familiar people during ordinary conversations?  None (4)    3. Remembering to take medications at the appropriate time?  None (4)    4. Remembering where things were placed or put away (e.g., keys)?  None (4)    5. Remembering a list of 4 or 5 errands without writing it down?  None (4)    6. Taking care of complicated tasks like managing a checking account or getting appliances fixed?  None 4    AM-PAC Cognition Screen:  Cognition Score: 24/24  Cognition Interpretation: no impairment suspected    [facility] Daily Activities Screen:    1. How much help is needed to eat meals? None (4)    2. How much help is needed to take care of personal grooming such as brushing teeth?  None (4)    3. How much help is needed for bathing (including washing, rinsing, and drying)?  A lot (2)    4. How much help is needed to put on and take off regular upper body clothing?  None (4)    5. How much help is needed to put on and take off  regular lower body clothing?  A lot (2)    6. How much help is needed for toileting, which includes using the toilet, bedpan or urinal?  A little (3)    AM-PAC Activities Screen:  Activities Score: 19/24  Activities Interpretation: suspected impairment, recommend OT consult         Predicted patient presentation: Low (stable) - Patient comorbidities and complexities, as defined above, will have little effect on progress for prescribed plan of care.    Pain at End of Session: RN informed on pain level  Pain: 9/10, location: Generalized    PLAN (while hospitalized)  Suggestions for next session as indicated:   PT Frequency: 3-5 x per week      PT/OT Mobility Equipment for Discharge: Owns RW and WC.  Interventions: balance, bed mobility, body mechanics, functional transfer training, gait training, strengthening and safety education        GOALS  Long Term Goals: (to be met by time of discharge from hospital)  Supine to sit: Patient will complete supine to sit modified independent.  Sit to stand: Patient will complete sit to stand transfer with 2-wheeled walker, modified independent.   Stand to sit: Patient will complete stand to sit transfer with 2-wheeled walker, modified independent.   Stand pivot: Patient will complete stand pivot transfer with 2-wheeled walker, modified independent.   Documented in the chart in the following areas: Prior Level of Function. Assessment/Plan.    Admitting diagnosis: Weakness generalized [R53.1]     Co-morbidities and problem list:   Patient Active Problem List:     Weakness generalized     Acute cystitis without hematuria     STEPHANIE (acute kidney injury) (CMD)     DM type 2, controlled, with complication  (CMD)     Chronic congestive heart failure with left ventricular diastolic dysfunction  (CMD)        The referring provider's electronic signature on the evaluation authorizes the therapy plan of care and certifies the need for these services, furnished under this plan of care while  under their care.      Patient at End of Session:   Location: in chair  Safety measures: alarm system in place/re-engaged, call light within reach, equipment intact and lines intact  Handoff to: nurse      Therapy procedure time and total treatment time can be found documented on the Time Entry flowsheet

## 2025-03-27 ENCOUNTER — TELEPHONE (OUTPATIENT)
Dept: FAMILY MEDICINE CLINIC | Age: 89
End: 2025-03-27

## 2025-03-27 DIAGNOSIS — K80.20 CALCULUS OF GALLBLADDER WITHOUT CHOLECYSTITIS WITHOUT OBSTRUCTION: ICD-10-CM

## 2025-03-27 DIAGNOSIS — R10.10 PAIN OF UPPER ABDOMEN: Primary | ICD-10-CM

## 2025-03-27 NOTE — TELEPHONE ENCOUNTER
She is calling to check in after taking the medication after 2 weeks. She states she is feeling better than she has since January, but still has some pain in her stomach, she stated she is waiting to hear about what they are going to do about her gall stones. She asked who Dr. Kang thinks would be good to take them out, because she wants them out if its going to stop all this stomach pain..

## 2025-03-29 NOTE — TELEPHONE ENCOUNTER
Referral placed to [facility] surgery.  Continue on the omeprazole.  The surgeon will talk to her about her symptoms and help us decide if he feels that the gallbladder is responsible for her symptoms or if it could be a stomach ulcer/iritation and the next steps to sort this out.

## 2025-04-02 ENCOUNTER — TELEPHONE (OUTPATIENT)
Dept: SURGERY | Age: 89
End: 2025-04-02

## 2025-04-02 ENCOUNTER — HOSPITAL ENCOUNTER (OUTPATIENT)
Age: 89
Discharge: HOME | End: 2025-04-02
Payer: MEDICARE

## 2025-04-02 ENCOUNTER — INITIAL CONSULT (OUTPATIENT)
Dept: SURGERY | Age: 89
End: 2025-04-02
Payer: MEDICARE

## 2025-04-02 ENCOUNTER — PREP FOR PROCEDURE (OUTPATIENT)
Dept: SURGERY | Age: 89
End: 2025-04-02

## 2025-04-02 ENCOUNTER — HOSPITAL ENCOUNTER (OUTPATIENT)
Dept: GENERAL RADIOLOGY | Age: 89
Discharge: HOME | End: 2025-04-04
Payer: MEDICARE

## 2025-04-02 ENCOUNTER — HOSPITAL ENCOUNTER (OUTPATIENT)
Dept: NON INVASIVE DIAGNOSTICS | Age: 89
Discharge: HOME | End: 2025-04-02
Payer: MEDICARE

## 2025-04-02 VITALS
BODY MASS INDEX: 31.18 KG/M2 | TEMPERATURE: 99 F | HEART RATE: 78 BPM | DIASTOLIC BLOOD PRESSURE: 70 MMHG | OXYGEN SATURATION: 95 % | HEIGHT: 63 IN | SYSTOLIC BLOOD PRESSURE: 138 MMHG | WEIGHT: 176 LBS

## 2025-04-02 DIAGNOSIS — C80.1 CANCER (HCC): ICD-10-CM

## 2025-04-02 DIAGNOSIS — Z01.818 PRE-OP TESTING: ICD-10-CM

## 2025-04-02 DIAGNOSIS — R10.11 RUQ ABDOMINAL PAIN: ICD-10-CM

## 2025-04-02 DIAGNOSIS — D32.9 MENINGIOMA (HCC): Primary | ICD-10-CM

## 2025-04-02 PROBLEM — K80.20 GALLSTONE: Status: ACTIVE | Noted: 2025-04-02

## 2025-04-02 LAB
ALBUMIN SERPL-MCNC: 4.6 G/DL (ref 3.5–5.2)
ALBUMIN/GLOB SERPL: 1.5 {RATIO} (ref 1–2.5)
ALP SERPL-CCNC: 99 U/L (ref 35–104)
ALT SERPL-CCNC: 26 U/L (ref 10–35)
ANION GAP SERPL CALCULATED.3IONS-SCNC: 11 MMOL/L (ref 9–16)
AST SERPL-CCNC: 27 U/L (ref 10–35)
BASOPHILS # BLD: 0 K/UL (ref 0–0.2)
BASOPHILS NFR BLD: 0 % (ref 0–1)
BILIRUB SERPL-MCNC: 0.4 MG/DL (ref 0–1.2)
BUN SERPL-MCNC: 28 MG/DL (ref 8–23)
BUN/CREAT SERPL: 20 (ref 9–20)
CALCIUM SERPL-MCNC: 10.8 MG/DL (ref 8.6–10.4)
CHLORIDE SERPL-SCNC: 105 MMOL/L (ref 98–107)
CO2 SERPL-SCNC: 26 MMOL/L (ref 20–31)
CREAT SERPL-MCNC: 1.4 MG/DL (ref 0.6–0.9)
EOSINOPHIL # BLD: 0.46 K/UL (ref 0–0.4)
EOSINOPHILS RELATIVE PERCENT: 1 % (ref 1–7)
ERYTHROCYTE [DISTWIDTH] IN BLOOD BY AUTOMATED COUNT: 13.4 % (ref 11.8–14.4)
GFR, ESTIMATED: 36 ML/MIN/1.73M2
GLUCOSE SERPL-MCNC: 100 MG/DL (ref 74–99)
HCT VFR BLD AUTO: 40.4 % (ref 36.3–47.1)
HGB BLD-MCNC: 13.1 G/DL (ref 11.9–15.1)
IMM GRANULOCYTES # BLD AUTO: 0 K/UL (ref 0–0.3)
IMM GRANULOCYTES NFR BLD: 0 %
LYMPHOCYTES NFR BLD: 34.72 K/UL (ref 1–4.8)
LYMPHOCYTES RELATIVE PERCENT: 76 % (ref 16–46)
MCH RBC QN AUTO: 30 PG (ref 25.2–33.5)
MCHC RBC AUTO-ENTMCNC: 32.4 G/DL (ref 25.2–33.5)
MCV RBC AUTO: 92.7 FL (ref 82.6–102.9)
MONOCYTES NFR BLD: 3.66 K/UL (ref 0.1–1.2)
MONOCYTES NFR BLD: 8 % (ref 4–11)
MORPHOLOGY: ABNORMAL
NEUTROPHILS NFR BLD: 15 % (ref 43–77)
NEUTS SEG NFR BLD: 6.86 K/UL (ref 1.5–8.1)
NRBC BLD-RTO: 0 PER 100 WBC
PLATELET # BLD AUTO: 178 K/UL (ref 138–453)
PMV BLD AUTO: 10.8 FL (ref 8.1–13.5)
POTASSIUM SERPL-SCNC: 4.3 MMOL/L (ref 3.7–5.3)
PROT SERPL-MCNC: 7.6 G/DL (ref 6.6–8.7)
RBC # BLD AUTO: 4.36 M/UL (ref 3.95–5.11)
SODIUM SERPL-SCNC: 142 MMOL/L (ref 136–145)
WBC OTHER # BLD: 45.7 K/UL (ref 3.5–11.3)

## 2025-04-02 PROCEDURE — 1159F MED LIST DOCD IN RCRD: CPT | Performed by: SURGERY

## 2025-04-02 PROCEDURE — 71046 X-RAY EXAM CHEST 2 VIEWS: CPT

## 2025-04-02 PROCEDURE — G8427 DOCREV CUR MEDS BY ELIG CLIN: HCPCS | Performed by: SURGERY

## 2025-04-02 PROCEDURE — 1036F TOBACCO NON-USER: CPT | Performed by: SURGERY

## 2025-04-02 PROCEDURE — 99204 OFFICE O/P NEW MOD 45 MIN: CPT | Performed by: SURGERY

## 2025-04-02 PROCEDURE — 80053 COMPREHEN METABOLIC PANEL: CPT

## 2025-04-02 PROCEDURE — 1123F ACP DISCUSS/DSCN MKR DOCD: CPT | Performed by: SURGERY

## 2025-04-02 PROCEDURE — 93005 ELECTROCARDIOGRAM TRACING: CPT

## 2025-04-02 PROCEDURE — 36415 COLL VENOUS BLD VENIPUNCTURE: CPT

## 2025-04-02 PROCEDURE — 85025 COMPLETE CBC W/AUTO DIFF WBC: CPT

## 2025-04-02 PROCEDURE — 1090F PRES/ABSN URINE INCON ASSESS: CPT | Performed by: SURGERY

## 2025-04-02 PROCEDURE — G8417 CALC BMI ABV UP PARAM F/U: HCPCS | Performed by: SURGERY

## 2025-04-02 PROCEDURE — 99205 OFFICE O/P NEW HI 60 MIN: CPT | Performed by: SURGERY

## 2025-04-02 NOTE — TELEPHONE ENCOUNTER
[facility]     Pre-Operative Evaluation/Consultation    Name:  Matilde Desai                                         Age:  89 y.o.  MRN:  [id number]       :  1934   Date:  2025         Sex: female        Surgeon:  Dr. Torres  Procedure (Planned):  Laparoscopic Robotic Assisted Cholecystectomy  Date Scheduled surgery: 25 with possible admission    Attending : No att. providers found    Primary Physician: Dr.Anna Mendez  Cardiologist: None    Type of Anesthesia Requested: General    Patient Medical history:  Allergies   Allergen Reactions    Aggrenox [Aspirin-Dipyridamole Er]      Sever HA    Amoxicillin Other (See Comments)    Atorvastatin      Other reaction(s): Muscle cramps    Doxycycline Other (See Comments)    Gabapentin     Guaifenesin Other (See Comments)    Prednisone     Vicodin [Hydrocodone-Acetaminophen]      Social History     Tobacco Use    Smoking status: Never    Smokeless tobacco: Never   Vaping Use    Vaping status: Never Used   Substance Use Topics    Alcohol use: No    Drug use: No         Additional ordered pre-operative testing:  [x]CBC - doing on 25   []ABG      [] BMP   []URINALYSIS   [x]CMP- doing on 25    []HCG   []COAGS PT/INR  []T&C  []LFTs   []TYPE AND SCREEN    [x] EKG- doing on 25  [x] Chest X-Ray- doing on 25  [] Other Radiology    [x] Sent to Hospitalist   [x] Sent to Anesthesia for your review: None   [] Additional Orders: None     Comments:None   Requests: None    Signed: Zehra Zapien LPN 2025 2:51 PM

## 2025-04-02 NOTE — PROGRESS NOTES
Patient states that she has had ABD pain since January in mid ABD.  Also has pain when she eats too much.  She starts Prilosec 40 mg daily on 3-12-25      3-18-25 US of gallbladder results from [facility]  IMPRESSION:    1.  Cholelithiasis.    2.  Multiple right renal cysts.     1-29-25 CT scan ABD/Pelvis results from [facility]  IMPRESSION:  Diverticulosis  Cholelithiasis  Simple cortical and parapelvic cyst in the kidneys  Fatty infiltration of the liver with small hepatic cyst  No other intr-abdominal or pelvic abnormality

## 2025-04-02 NOTE — PATIENT INSTRUCTIONS
Obtain pre op labs, EKG, and Chest x-ray  Follow pre op instructions for gallbladder removal on 4-7-25

## 2025-04-02 NOTE — PROGRESS NOTES
Matilde Desai is a 89 y.o. female      CC:    Upper abdominal pain with pressure and bloating.  + cholelithiasis    HISTORY OF PRESENT ILLNESS:    Patient is a 89-year-old female who has had increased mid abdominal pain right-sided abdominal pain with abdominal pressure and bloating.    Patient states that she has had ABD pain since January in mid ABD.  Also has pain when she eats too much.  She starts Prilosec 40 mg daily on 3-12-25        3-18-25 US of gallbladder results from [facility]  IMPRESSION:    1.  Cholelithiasis.    2.  Multiple right renal cysts.      1-29-25 CT scan ABD/Pelvis results from [facility]  IMPRESSION:  Diverticulosis  Cholelithiasis  Simple cortical and parapelvic cyst in the kidneys  Fatty infiltration of the liver with small hepatic cyst  No other intr-abdominal or pelvic abnormality       Review of Systems:    General:  Fever: Negative  Weight Change:Negative  Night Sweats: Negative    Eye:  Blurry Vision:Negative  Double Vision: Negative    Ent:  Headaches: Negative  Sore throat: Negative    Allergy/Immunology:  Hives: Negative  Latex allergy: Negative    Hematology/Lymphatic:  Bleeding Problems: Negative  Blood Clots: Negative  Swollen Lymph Nodes: Negative    Lungs:  Cough: Negative  SOB: Negative  Wheezing:Negative    Cardiovascular:  Chest Pain: Negative  Palpitations:Negative    GI:   Decreased Appetite: Negative  Heartburn: Negative  Dysphagia: Negative  Nausea/Vomiting: Negative  Abdominal Pain: Negative  Change in Bowels:Negative  Constipation: Negative  Diarrhea: Negative  Rectal Bleeding: Negative    :   Dysuria: Negative  Increase Urinary Frequency/Urgency: Negative    Neuro:  Seizures: Negative  Confusion: Negative        PAST MEDICAL HISTORY:      Family History   Problem Relation Age of Onset    Cancer Mother         skin    Heart Disease Father     High Blood Pressure Father     Stroke Father     Coronary Art Dis Father     Heart Attack Father     Cancer Sister         melanoma

## 2025-04-02 NOTE — TELEPHONE ENCOUNTER
[facility]         Patient:Matilde Desai           :1934           Surgical/Procedure Planned: Laparoscopic Robotic Assisted Cholecystectomy     Date & Location: 25 @ [facility]       Outpatient with possible admission   Planned Length of OR: 1 hour    Sedation: general        Estimated Cardiac Risk for Non-Cardiac Surgery/Procedure     Low______ Moderate______ High______    Medication Instructions - Clarification needed by this date:       Eliquis 5 mg twice daily Hold ___ Days    Resume medications:     Lovenox indicated: _____Yes _____NO    Provider:Dr Torres      Signature of Provider Giving Orders for Medication holds:    _____________________________________________

## 2025-04-03 LAB
EKG ATRIAL RATE: 78 BPM
EKG P AXIS: 49 DEGREES
EKG P-R INTERVAL: 210 MS
EKG Q-T INTERVAL: 376 MS
EKG QRS DURATION: 82 MS
EKG QTC CALCULATION (BAZETT): 428 MS
EKG R AXIS: 47 DEGREES
EKG T AXIS: 81 DEGREES
EKG VENTRICULAR RATE: 78 BPM

## 2025-04-03 NOTE — TELEPHONE ENCOUNTER
[facility]         Patient:Matilde Desai           :1934           Surgical/Procedure Planned: Laparoscopic Robotic Assisted Cholecystectomy     Date & Location: 25 @ [facility]       Outpatient with possible admission   Planned Length of OR: 1 hour    Sedation: general        Estimated Cardiac Risk for Non-Cardiac Surgery/Procedure     Low______ Moderate__xx__ High______    Medication Instructions - Clarification needed by this date:       Eliquis 5 mg twice daily Hold _3__ Days    Resume medications:     Lovenox indicated: _____Yes __xx___NO    Provider:Dr Torres      Signature of Provider Giving Orders for Medication holds:    ______Minoo Mendez MD________

## 2025-04-05 ENCOUNTER — RESULTS FOLLOW-UP (OUTPATIENT)
Dept: SURGERY | Age: 89
End: 2025-04-05

## 2025-04-07 ENCOUNTER — HOSPITAL ENCOUNTER (INPATIENT)
Age: 89
LOS: 2 days | Discharge: HOME | DRG: 418 | End: 2025-04-09
Attending: SURGERY | Admitting: SURGERY
Payer: MEDICARE

## 2025-04-07 ENCOUNTER — ANESTHESIA (OUTPATIENT)
Dept: OPERATING ROOM | Age: 89
DRG: 418 | End: 2025-04-07
Payer: MEDICARE

## 2025-04-07 ENCOUNTER — ANESTHESIA EVENT (OUTPATIENT)
Dept: OPERATING ROOM | Age: 89
DRG: 418 | End: 2025-04-07
Payer: MEDICARE

## 2025-04-07 DIAGNOSIS — R10.11 RUQ ABDOMINAL PAIN: ICD-10-CM

## 2025-04-07 DIAGNOSIS — K80.20 GALLSTONE: ICD-10-CM

## 2025-04-07 DIAGNOSIS — G89.18 POST-OPERATIVE PAIN: Primary | ICD-10-CM

## 2025-04-07 PROBLEM — D50.0 IRON DEFICIENCY ANEMIA DUE TO CHRONIC BLOOD LOSS: Status: ACTIVE | Noted: 2025-01-29

## 2025-04-07 PROBLEM — Z98.890 S/P LAPAROSCOPY WITH LYSIS OF ADHESIONS: Status: ACTIVE | Noted: 2025-04-07

## 2025-04-07 PROBLEM — N18.31 STAGE 3A CHRONIC KIDNEY DISEASE (HCC): Status: ACTIVE | Noted: 2022-04-24

## 2025-04-07 PROBLEM — K66.0 ABDOMINAL ADHESIONS: Status: ACTIVE | Noted: 2018-01-10

## 2025-04-07 LAB
ANION GAP SERPL CALCULATED.3IONS-SCNC: 13 MMOL/L (ref 9–16)
BUN SERPL-MCNC: 18 MG/DL (ref 8–23)
BUN/CREAT SERPL: 16 (ref 9–20)
CALCIUM SERPL-MCNC: 9.8 MG/DL (ref 8.6–10.4)
CHLORIDE SERPL-SCNC: 105 MMOL/L (ref 98–107)
CO2 SERPL-SCNC: 22 MMOL/L (ref 20–31)
CREAT SERPL-MCNC: 1.1 MG/DL (ref 0.6–0.9)
ERYTHROCYTE [DISTWIDTH] IN BLOOD BY AUTOMATED COUNT: 13.7 % (ref 11.8–14.4)
GFR, ESTIMATED: 48 ML/MIN/1.73M2
GLUCOSE BLD-MCNC: 126 MG/DL (ref 65–105)
GLUCOSE SERPL-MCNC: 142 MG/DL (ref 74–99)
HCT VFR BLD AUTO: 37.5 % (ref 36.3–47.1)
HGB BLD-MCNC: 12.4 G/DL (ref 11.9–15.1)
MCH RBC QN AUTO: 30.8 PG (ref 25.2–33.5)
MCHC RBC AUTO-ENTMCNC: 33.1 G/DL (ref 25.2–33.5)
MCV RBC AUTO: 93.3 FL (ref 82.6–102.9)
NRBC BLD-RTO: 0 PER 100 WBC
PLATELET # BLD AUTO: 185 K/UL (ref 138–453)
PMV BLD AUTO: 11.1 FL (ref 8.1–13.5)
POTASSIUM SERPL-SCNC: 3.8 MMOL/L (ref 3.7–5.3)
RBC # BLD AUTO: 4.02 M/UL (ref 3.95–5.11)
SODIUM SERPL-SCNC: 140 MMOL/L (ref 136–145)
WBC OTHER # BLD: 52.4 K/UL (ref 3.5–11.3)

## 2025-04-07 PROCEDURE — 2500000003 HC RX 250 WO HCPCS: Performed by: NURSE ANESTHETIST, CERTIFIED REGISTERED

## 2025-04-07 PROCEDURE — 0DN84ZZ RELEASE SMALL INTESTINE, PERCUTANEOUS ENDOSCOPIC APPROACH: ICD-10-PCS | Performed by: SURGERY

## 2025-04-07 PROCEDURE — 6370000000 HC RX 637 (ALT 250 FOR IP): Performed by: PHYSICIAN ASSISTANT

## 2025-04-07 PROCEDURE — 2709999900 HC NON-CHARGEABLE SUPPLY: Performed by: SURGERY

## 2025-04-07 PROCEDURE — 88304 TISSUE EXAM BY PATHOLOGIST: CPT

## 2025-04-07 PROCEDURE — 82947 ASSAY GLUCOSE BLOOD QUANT: CPT

## 2025-04-07 PROCEDURE — 0DNU4ZZ RELEASE OMENTUM, PERCUTANEOUS ENDOSCOPIC APPROACH: ICD-10-PCS | Performed by: SURGERY

## 2025-04-07 PROCEDURE — 6360000002 HC RX W HCPCS: Performed by: SURGERY

## 2025-04-07 PROCEDURE — 0FT44ZZ RESECTION OF GALLBLADDER, PERCUTANEOUS ENDOSCOPIC APPROACH: ICD-10-PCS | Performed by: SURGERY

## 2025-04-07 PROCEDURE — 3700000000 HC ANESTHESIA ATTENDED CARE: Performed by: SURGERY

## 2025-04-07 PROCEDURE — 80048 BASIC METABOLIC PNL TOTAL CA: CPT

## 2025-04-07 PROCEDURE — 2580000003 HC RX 258: Performed by: PHYSICIAN ASSISTANT

## 2025-04-07 PROCEDURE — 7100000000 HC PACU RECOVERY - FIRST 15 MIN: Performed by: SURGERY

## 2025-04-07 PROCEDURE — 36415 COLL VENOUS BLD VENIPUNCTURE: CPT

## 2025-04-07 PROCEDURE — 6370000000 HC RX 637 (ALT 250 FOR IP): Performed by: NURSE PRACTITIONER

## 2025-04-07 PROCEDURE — 1200000000 HC SEMI PRIVATE

## 2025-04-07 PROCEDURE — 2500000003 HC RX 250 WO HCPCS: Performed by: SURGERY

## 2025-04-07 PROCEDURE — 3600000019 HC SURGERY ROBOT ADDTL 15MIN: Performed by: SURGERY

## 2025-04-07 PROCEDURE — 2720000010 HC SURG SUPPLY STERILE: Performed by: SURGERY

## 2025-04-07 PROCEDURE — 3600000009 HC SURGERY ROBOT BASE: Performed by: SURGERY

## 2025-04-07 PROCEDURE — 3700000001 HC ADD 15 MINUTES (ANESTHESIA): Performed by: SURGERY

## 2025-04-07 PROCEDURE — 85027 COMPLETE CBC AUTOMATED: CPT

## 2025-04-07 PROCEDURE — 8E0W4CZ ROBOTIC ASSISTED PROCEDURE OF TRUNK REGION, PERCUTANEOUS ENDOSCOPIC APPROACH: ICD-10-PCS | Performed by: SURGERY

## 2025-04-07 PROCEDURE — 6360000002 HC RX W HCPCS: Performed by: NURSE ANESTHETIST, CERTIFIED REGISTERED

## 2025-04-07 PROCEDURE — 2580000003 HC RX 258: Performed by: NURSE ANESTHETIST, CERTIFIED REGISTERED

## 2025-04-07 PROCEDURE — 6370000000 HC RX 637 (ALT 250 FOR IP): Performed by: FAMILY MEDICINE

## 2025-04-07 PROCEDURE — 2580000003 HC RX 258: Performed by: SURGERY

## 2025-04-07 PROCEDURE — S2900 ROBOTIC SURGICAL SYSTEM: HCPCS | Performed by: SURGERY

## 2025-04-07 PROCEDURE — 7100000001 HC PACU RECOVERY - ADDTL 15 MIN: Performed by: SURGERY

## 2025-04-07 RX ORDER — OXYCODONE HYDROCHLORIDE 5 MG/1
5 TABLET ORAL EVERY 4 HOURS PRN
Refills: 0 | Status: DISCONTINUED | OUTPATIENT
Start: 2025-04-07 | End: 2025-04-09 | Stop reason: HOSPADM

## 2025-04-07 RX ORDER — SODIUM CHLORIDE 9 MG/ML
INJECTION, SOLUTION INTRAVENOUS PRN
Status: DISCONTINUED | OUTPATIENT
Start: 2025-04-07 | End: 2025-04-07 | Stop reason: HOSPADM

## 2025-04-07 RX ORDER — LATANOPROST 50 UG/ML
1 SOLUTION/ DROPS OPHTHALMIC NIGHTLY
Status: DISCONTINUED | OUTPATIENT
Start: 2025-04-07 | End: 2025-04-09 | Stop reason: HOSPADM

## 2025-04-07 RX ORDER — ONDANSETRON 2 MG/ML
4 INJECTION INTRAMUSCULAR; INTRAVENOUS EVERY 6 HOURS PRN
Status: DISCONTINUED | OUTPATIENT
Start: 2025-04-07 | End: 2025-04-09 | Stop reason: HOSPADM

## 2025-04-07 RX ORDER — ONDANSETRON 2 MG/ML
4 INJECTION INTRAMUSCULAR; INTRAVENOUS EVERY 6 HOURS PRN
Status: DISCONTINUED | OUTPATIENT
Start: 2025-04-07 | End: 2025-04-07 | Stop reason: SDUPTHER

## 2025-04-07 RX ORDER — HYDROCHLOROTHIAZIDE 25 MG/1
25 TABLET ORAL DAILY
Status: DISCONTINUED | OUTPATIENT
Start: 2025-04-08 | End: 2025-04-09 | Stop reason: HOSPADM

## 2025-04-07 RX ORDER — INDOCYANINE GREEN AND WATER 25 MG
2.5 KIT INJECTION ONCE
Status: COMPLETED | OUTPATIENT
Start: 2025-04-07 | End: 2025-04-07

## 2025-04-07 RX ORDER — NETARSUDIL 0.2 MG/ML
1 SOLUTION/ DROPS OPHTHALMIC; TOPICAL EVERY EVENING
COMMUNITY

## 2025-04-07 RX ORDER — SODIUM CHLORIDE 0.9 % (FLUSH) 0.9 %
5-40 SYRINGE (ML) INJECTION EVERY 12 HOURS SCHEDULED
Status: DISCONTINUED | OUTPATIENT
Start: 2025-04-07 | End: 2025-04-07 | Stop reason: HOSPADM

## 2025-04-07 RX ORDER — CARVEDILOL 3.12 MG/1
6.25 TABLET ORAL 2 TIMES DAILY
Status: DISCONTINUED | OUTPATIENT
Start: 2025-04-07 | End: 2025-04-09 | Stop reason: HOSPADM

## 2025-04-07 RX ORDER — DEXTROSE MONOHYDRATE 100 MG/ML
INJECTION, SOLUTION INTRAVENOUS CONTINUOUS PRN
Status: DISCONTINUED | OUTPATIENT
Start: 2025-04-07 | End: 2025-04-07 | Stop reason: HOSPADM

## 2025-04-07 RX ORDER — NALOXONE HYDROCHLORIDE 0.4 MG/ML
INJECTION, SOLUTION INTRAMUSCULAR; INTRAVENOUS; SUBCUTANEOUS PRN
Status: DISCONTINUED | OUTPATIENT
Start: 2025-04-07 | End: 2025-04-07 | Stop reason: HOSPADM

## 2025-04-07 RX ORDER — ONDANSETRON 4 MG/1
4 TABLET, ORALLY DISINTEGRATING ORAL EVERY 8 HOURS PRN
Status: DISCONTINUED | OUTPATIENT
Start: 2025-04-07 | End: 2025-04-09 | Stop reason: HOSPADM

## 2025-04-07 RX ORDER — SODIUM CHLORIDE, SODIUM LACTATE, POTASSIUM CHLORIDE, CALCIUM CHLORIDE 600; 310; 30; 20 MG/100ML; MG/100ML; MG/100ML; MG/100ML
INJECTION, SOLUTION INTRAVENOUS
Status: DISCONTINUED | OUTPATIENT
Start: 2025-04-07 | End: 2025-04-07 | Stop reason: SDUPTHER

## 2025-04-07 RX ORDER — OXYCODONE HYDROCHLORIDE 5 MG/1
2.5 TABLET ORAL EVERY 4 HOURS PRN
Refills: 0 | Status: DISCONTINUED | OUTPATIENT
Start: 2025-04-07 | End: 2025-04-09 | Stop reason: HOSPADM

## 2025-04-07 RX ORDER — DEXMEDETOMIDINE HYDROCHLORIDE 100 UG/ML
INJECTION, SOLUTION INTRAVENOUS
Status: DISCONTINUED | OUTPATIENT
Start: 2025-04-07 | End: 2025-04-07 | Stop reason: SDUPTHER

## 2025-04-07 RX ORDER — OXYCODONE HYDROCHLORIDE 5 MG/1
5 TABLET ORAL
Status: DISCONTINUED | OUTPATIENT
Start: 2025-04-07 | End: 2025-04-08 | Stop reason: SDUPTHER

## 2025-04-07 RX ORDER — SODIUM CHLORIDE 0.9 % (FLUSH) 0.9 %
10 SYRINGE (ML) INJECTION EVERY 12 HOURS SCHEDULED
Status: DISCONTINUED | OUTPATIENT
Start: 2025-04-07 | End: 2025-04-09 | Stop reason: HOSPADM

## 2025-04-07 RX ORDER — SODIUM CHLORIDE, SODIUM LACTATE, POTASSIUM CHLORIDE, CALCIUM CHLORIDE 600; 310; 30; 20 MG/100ML; MG/100ML; MG/100ML; MG/100ML
INJECTION, SOLUTION INTRAVENOUS CONTINUOUS
Status: DISCONTINUED | OUTPATIENT
Start: 2025-04-07 | End: 2025-04-08

## 2025-04-07 RX ORDER — SODIUM CHLORIDE 0.9 % (FLUSH) 0.9 %
10 SYRINGE (ML) INJECTION PRN
Status: DISCONTINUED | OUTPATIENT
Start: 2025-04-07 | End: 2025-04-09 | Stop reason: HOSPADM

## 2025-04-07 RX ORDER — GLUCAGON 1 MG/ML
1 KIT INJECTION PRN
Status: DISCONTINUED | OUTPATIENT
Start: 2025-04-07 | End: 2025-04-07 | Stop reason: HOSPADM

## 2025-04-07 RX ORDER — POLYETHYLENE GLYCOL 3350 17 G/17G
17 POWDER, FOR SOLUTION ORAL DAILY
Status: DISCONTINUED | OUTPATIENT
Start: 2025-04-07 | End: 2025-04-09 | Stop reason: HOSPADM

## 2025-04-07 RX ORDER — PROPOFOL 10 MG/ML
INJECTION, EMULSION INTRAVENOUS
Status: DISCONTINUED | OUTPATIENT
Start: 2025-04-07 | End: 2025-04-07 | Stop reason: SDUPTHER

## 2025-04-07 RX ORDER — SODIUM CHLORIDE 0.9 % (FLUSH) 0.9 %
5-40 SYRINGE (ML) INJECTION PRN
Status: DISCONTINUED | OUTPATIENT
Start: 2025-04-07 | End: 2025-04-07 | Stop reason: HOSPADM

## 2025-04-07 RX ORDER — ACETAMINOPHEN 325 MG/1
650 TABLET ORAL EVERY 4 HOURS PRN
Status: DISCONTINUED | OUTPATIENT
Start: 2025-04-07 | End: 2025-04-09 | Stop reason: HOSPADM

## 2025-04-07 RX ORDER — SODIUM CHLORIDE 9 MG/ML
INJECTION, SOLUTION INTRAVENOUS PRN
Status: DISCONTINUED | OUTPATIENT
Start: 2025-04-07 | End: 2025-04-09 | Stop reason: HOSPADM

## 2025-04-07 RX ORDER — IPRATROPIUM BROMIDE AND ALBUTEROL SULFATE 2.5; .5 MG/3ML; MG/3ML
1 SOLUTION RESPIRATORY (INHALATION)
Status: DISCONTINUED | OUTPATIENT
Start: 2025-04-07 | End: 2025-04-07 | Stop reason: HOSPADM

## 2025-04-07 RX ORDER — MORPHINE SULFATE 2 MG/ML
2 INJECTION, SOLUTION INTRAMUSCULAR; INTRAVENOUS
Refills: 0 | Status: DISCONTINUED | OUTPATIENT
Start: 2025-04-07 | End: 2025-04-09 | Stop reason: HOSPADM

## 2025-04-07 RX ORDER — ENOXAPARIN SODIUM 100 MG/ML
30 INJECTION SUBCUTANEOUS DAILY
Status: DISCONTINUED | OUTPATIENT
Start: 2025-04-07 | End: 2025-04-07

## 2025-04-07 RX ORDER — DROPERIDOL 2.5 MG/ML
0.62 INJECTION, SOLUTION INTRAMUSCULAR; INTRAVENOUS
Status: DISCONTINUED | OUTPATIENT
Start: 2025-04-07 | End: 2025-04-07 | Stop reason: HOSPADM

## 2025-04-07 RX ORDER — DEXAMETHASONE SODIUM PHOSPHATE 4 MG/ML
INJECTION, SOLUTION INTRA-ARTICULAR; INTRALESIONAL; INTRAMUSCULAR; INTRAVENOUS; SOFT TISSUE
Status: DISCONTINUED | OUTPATIENT
Start: 2025-04-07 | End: 2025-04-07 | Stop reason: SDUPTHER

## 2025-04-07 RX ORDER — ROCURONIUM BROMIDE 10 MG/ML
INJECTION, SOLUTION INTRAVENOUS
Status: DISCONTINUED | OUTPATIENT
Start: 2025-04-07 | End: 2025-04-07 | Stop reason: SDUPTHER

## 2025-04-07 RX ORDER — FENTANYL CITRATE 50 UG/ML
25 INJECTION, SOLUTION INTRAMUSCULAR; INTRAVENOUS EVERY 5 MIN PRN
Status: DISCONTINUED | OUTPATIENT
Start: 2025-04-07 | End: 2025-04-07 | Stop reason: HOSPADM

## 2025-04-07 RX ORDER — PANTOPRAZOLE SODIUM 40 MG/1
40 TABLET, DELAYED RELEASE ORAL
Status: DISCONTINUED | OUTPATIENT
Start: 2025-04-08 | End: 2025-04-09 | Stop reason: HOSPADM

## 2025-04-07 RX ORDER — SODIUM CHLORIDE, SODIUM LACTATE, POTASSIUM CHLORIDE, CALCIUM CHLORIDE 600; 310; 30; 20 MG/100ML; MG/100ML; MG/100ML; MG/100ML
INJECTION, SOLUTION INTRAVENOUS CONTINUOUS
Status: DISCONTINUED | OUTPATIENT
Start: 2025-04-07 | End: 2025-04-07 | Stop reason: HOSPADM

## 2025-04-07 RX ORDER — HYDRALAZINE HYDROCHLORIDE 20 MG/ML
10 INJECTION INTRAMUSCULAR; INTRAVENOUS EVERY 6 HOURS PRN
Status: DISCONTINUED | OUTPATIENT
Start: 2025-04-07 | End: 2025-04-09 | Stop reason: HOSPADM

## 2025-04-07 RX ORDER — ONDANSETRON 2 MG/ML
INJECTION INTRAMUSCULAR; INTRAVENOUS
Status: DISCONTINUED | OUTPATIENT
Start: 2025-04-07 | End: 2025-04-07 | Stop reason: SDUPTHER

## 2025-04-07 RX ADMIN — Medication 25 MG: at 10:00

## 2025-04-07 RX ADMIN — SODIUM CHLORIDE, POTASSIUM CHLORIDE, SODIUM LACTATE AND CALCIUM CHLORIDE: 600; 310; 30; 20 INJECTION, SOLUTION INTRAVENOUS at 21:25

## 2025-04-07 RX ADMIN — DEXMEDETOMIDINE HYDROCHLORIDE 20 MCG: 100 INJECTION, SOLUTION INTRAVENOUS at 09:45

## 2025-04-07 RX ADMIN — SUGAMMADEX 200 MG: 100 INJECTION, SOLUTION INTRAVENOUS at 11:36

## 2025-04-07 RX ADMIN — CARVEDILOL 6.25 MG: 3.12 TABLET, FILM COATED ORAL at 21:29

## 2025-04-07 RX ADMIN — DEXAMETHASONE SODIUM PHOSPHATE 4 MG: 4 INJECTION INTRA-ARTICULAR; INTRALESIONAL; INTRAMUSCULAR; INTRAVENOUS; SOFT TISSUE at 09:45

## 2025-04-07 RX ADMIN — PROPOFOL 150 MCG/KG/MIN: 10 INJECTION, EMULSION INTRAVENOUS at 09:46

## 2025-04-07 RX ADMIN — SODIUM CHLORIDE, POTASSIUM CHLORIDE, SODIUM LACTATE AND CALCIUM CHLORIDE: 600; 310; 30; 20 INJECTION, SOLUTION INTRAVENOUS at 09:43

## 2025-04-07 RX ADMIN — SODIUM CHLORIDE, SODIUM LACTATE, POTASSIUM CHLORIDE, AND CALCIUM CHLORIDE: .6; .31; .03; .02 INJECTION, SOLUTION INTRAVENOUS at 09:22

## 2025-04-07 RX ADMIN — PHENYLEPHRINE HYDROCHLORIDE 100 MCG: 10 INJECTION INTRAVENOUS at 10:12

## 2025-04-07 RX ADMIN — Medication 25 MG: at 09:45

## 2025-04-07 RX ADMIN — ROCURONIUM BROMIDE 20 MG: 10 INJECTION INTRAVENOUS at 10:59

## 2025-04-07 RX ADMIN — SODIUM CHLORIDE, POTASSIUM CHLORIDE, SODIUM LACTATE AND CALCIUM CHLORIDE: 600; 310; 30; 20 INJECTION, SOLUTION INTRAVENOUS at 13:27

## 2025-04-07 RX ADMIN — ACETAMINOPHEN 650 MG: 325 TABLET ORAL at 21:42

## 2025-04-07 RX ADMIN — PROPOFOL 150 MG: 10 INJECTION, EMULSION INTRAVENOUS at 09:45

## 2025-04-07 RX ADMIN — Medication 2000 MG: at 09:43

## 2025-04-07 RX ADMIN — PROPOFOL 50 MG: 10 INJECTION, EMULSION INTRAVENOUS at 09:49

## 2025-04-07 RX ADMIN — PHENYLEPHRINE HYDROCHLORIDE 100 MCG: 10 INJECTION INTRAVENOUS at 10:53

## 2025-04-07 RX ADMIN — OXYCODONE HYDROCHLORIDE 2.5 MG: 5 TABLET ORAL at 18:53

## 2025-04-07 RX ADMIN — POLYETHYLENE GLYCOL 3350 17 G: 17 POWDER, FOR SOLUTION ORAL at 17:52

## 2025-04-07 RX ADMIN — INDOCYANINE GREEN AND WATER 2.5 MG: KIT at 10:02

## 2025-04-07 RX ADMIN — HYDROMORPHONE HYDROCHLORIDE 1 MG: 1 INJECTION, SOLUTION INTRAMUSCULAR; INTRAVENOUS; SUBCUTANEOUS at 11:25

## 2025-04-07 RX ADMIN — ONDANSETRON 4 MG: 2 INJECTION, SOLUTION INTRAMUSCULAR; INTRAVENOUS at 09:45

## 2025-04-07 RX ADMIN — ROCURONIUM BROMIDE 100 MG: 10 INJECTION INTRAVENOUS at 09:45

## 2025-04-07 ASSESSMENT — PAIN SCALES - WONG BAKER: WONGBAKER_NUMERICALRESPONSE: NO HURT

## 2025-04-07 ASSESSMENT — PAIN - FUNCTIONAL ASSESSMENT
PAIN_FUNCTIONAL_ASSESSMENT: ACTIVITIES ARE NOT PREVENTED
PAIN_FUNCTIONAL_ASSESSMENT: 0-10

## 2025-04-07 ASSESSMENT — PAIN DESCRIPTION - DESCRIPTORS
DESCRIPTORS: CRAMPING
DESCRIPTORS: ACHING;SORE

## 2025-04-07 ASSESSMENT — PAIN SCALES - GENERAL
PAINLEVEL_OUTOF10: 6
PAINLEVEL_OUTOF10: 0
PAINLEVEL_OUTOF10: 5

## 2025-04-07 ASSESSMENT — PAIN DESCRIPTION - LOCATION
LOCATION: ABDOMEN
LOCATION: ABDOMEN

## 2025-04-07 NOTE — OP NOTE
skin after removing the trocars and deflated the abdomen with 4-0 Monocryl and Steri-Strips sterile dressings were applied patient was segment recovery room in stable condition    Electronically signed by FRANKLIN DENNISON MD on 4/7/2025 at 11:37 AM

## 2025-04-07 NOTE — ANESTHESIA PRE PROCEDURE
Left atrium is normal in size.     Right Atrium   Right atrium is normal in size.     IVC/SVC   There is normal collapse with deep inspiration.     Mitral Valve   Mitral valve structure is normal. There is trace regurgitation. There is no evidence of mitral valve stenosis.     Tricuspid Valve   Tricuspid valve appears to be normal. There is mild to moderate regurgitation. There is no evidence of tricuspid valve stenosis. RVSP calculated at 28 mmHg. RVSP is based on RA pressure of 3 mmHg.     Aortic Valve   The aortic valve is trileaflet. There is no regurgitation or stenosis.     Pulmonic Valve   Pulmonic valve structure is grossly normal. There is trace regurgitation. There is no evidence of pulmonic valve stenosis.     Ascending Aorta   The aortic root is normal in size.     Pericardium   There is no pericardial effusion.      Anesthesia Plan      general and TIVA     ASA 3     (Matilde has a DNR order on file, we discussed the need to address this for surgery today.  She decided to limit the DNR to no compressions.  )  Induction: intravenous.    MIPS: Postoperative opioids intended and Prophylactic antiemetics administered.  Anesthetic plan and risks discussed with patient.    Use of blood products discussed with patient whom consented to blood products.    Plan discussed with surgical team.                    Du Smith, APRN - CRNA   4/7/2025

## 2025-04-07 NOTE — ANESTHESIA POSTPROCEDURE EVALUATION
Department of Anesthesiology  Postprocedure Note    Patient: Matilde Desai  MRN: [id number]  YOB: 1934  Date of evaluation: 4/7/2025    Procedure Summary       Date: 04/07/25 Room / Location: [address]    Anesthesia Start: 0943 Anesthesia Stop: 1154    Procedure: Laparoscopic lysis of adhesions &  robotic assisted cholecystectomy (Abdomen) Diagnosis:       Gallstone      RUQ abdominal pain      (Gallstone [K80.20])      (RUQ abdominal pain [R10.11])    Surgeons: Clint Torres MD Responsible Provider: Du Smith APRN - CRNA    Anesthesia Type: General, TIVA ASA Status: 3            Anesthesia Type: General, TIVA    Vitaliy Phase I: Vitaliy Score: 10    Vitaliy Phase II:      Anesthesia Post Evaluation    Patient location during evaluation: PACU  Level of consciousness: sleepy but conscious  Airway patency: patent  Nausea & Vomiting: no nausea and no vomiting  Cardiovascular status: hemodynamically stable  Respiratory status: spontaneous ventilation  Hydration status: stable  Multimodal analgesia pain management approach  Pain management: satisfactory to patient    No notable events documented.

## 2025-04-07 NOTE — H&P
Matilde Desai is a 89 y.o. female      CC:    Upper abdominal pain with pressure and bloating.  + cholelithiasis    HISTORY OF PRESENT ILLNESS:    Patient is a 89-year-old female who has had increased mid abdominal pain right-sided abdominal pain with abdominal pressure and bloating.    Patient states that she has had ABD pain since January in mid ABD.  Also has pain when she eats too much.  She starts Prilosec 40 mg daily on 3-12-25        3-18-25 US of gallbladder results from [facility]  IMPRESSION:    1.  Cholelithiasis.    2.  Multiple right renal cysts.      1-29-25 CT scan ABD/Pelvis results from [facility]  IMPRESSION:  Diverticulosis  Cholelithiasis  Simple cortical and parapelvic cyst in the kidneys  Fatty infiltration of the liver with small hepatic cyst  No other intr-abdominal or pelvic abnormality       Review of Systems:    General:  Fever: Negative  Weight Change:Negative  Night Sweats: Negative    Eye:  Blurry Vision:Negative  Double Vision: Negative    Ent:  Headaches: Negative  Sore throat: Negative    Allergy/Immunology:  Hives: Negative  Latex allergy: Negative    Hematology/Lymphatic:  Bleeding Problems: Negative  Blood Clots: Negative  Swollen Lymph Nodes: Negative    Lungs:  Cough: Negative  SOB: Negative  Wheezing:Negative    Cardiovascular:  Chest Pain: Negative  Palpitations:Negative    GI:   Decreased Appetite: Negative  Heartburn: Negative  Dysphagia: Negative  Nausea/Vomiting: Negative  Abdominal Pain: Negative  Change in Bowels:Negative  Constipation: Negative  Diarrhea: Negative  Rectal Bleeding: Negative    :   Dysuria: Negative  Increase Urinary Frequency/Urgency: Negative    Neuro:  Seizures: Negative  Confusion: Negative        PAST MEDICAL HISTORY:      Family History   Problem Relation Age of Onset    Cancer Mother         skin    Heart Disease Father     High Blood Pressure Father     Stroke Father     Coronary Art Dis Father     Heart Attack Father     Cancer Sister         melanoma

## 2025-04-08 LAB
ALBUMIN SERPL-MCNC: 3.6 G/DL (ref 3.5–5.2)
ALBUMIN/GLOB SERPL: 1.5 {RATIO} (ref 1–2.5)
ALP SERPL-CCNC: 79 U/L (ref 35–104)
ALT SERPL-CCNC: 40 U/L (ref 10–35)
ANION GAP SERPL CALCULATED.3IONS-SCNC: 9 MMOL/L (ref 9–16)
AST SERPL-CCNC: 35 U/L (ref 10–35)
BASOPHILS # BLD: 0 K/UL (ref 0–0.2)
BASOPHILS NFR BLD: 0 % (ref 0–1)
BILIRUB SERPL-MCNC: 0.7 MG/DL (ref 0–1.2)
BUN SERPL-MCNC: 18 MG/DL (ref 8–23)
BUN/CREAT SERPL: 18 (ref 9–20)
CALCIUM SERPL-MCNC: 9.5 MG/DL (ref 8.6–10.4)
CHLORIDE SERPL-SCNC: 107 MMOL/L (ref 98–107)
CO2 SERPL-SCNC: 27 MMOL/L (ref 20–31)
CREAT SERPL-MCNC: 1 MG/DL (ref 0.6–0.9)
EOSINOPHIL # BLD: 0 K/UL (ref 0–0.4)
EOSINOPHILS RELATIVE PERCENT: 0 % (ref 1–7)
ERYTHROCYTE [DISTWIDTH] IN BLOOD BY AUTOMATED COUNT: 13.6 % (ref 11.8–14.4)
GFR, ESTIMATED: 54 ML/MIN/1.73M2
GLUCOSE SERPL-MCNC: 118 MG/DL (ref 74–99)
HCT VFR BLD AUTO: 33.4 % (ref 36.3–47.1)
HGB BLD-MCNC: 11 G/DL (ref 11.9–15.1)
IMM GRANULOCYTES # BLD AUTO: 0 K/UL (ref 0–0.3)
IMM GRANULOCYTES NFR BLD: 0 %
LYMPHOCYTES NFR BLD: 27.94 K/UL (ref 1–4.8)
LYMPHOCYTES RELATIVE PERCENT: 67 % (ref 16–46)
MCH RBC QN AUTO: 30.8 PG (ref 25.2–33.5)
MCHC RBC AUTO-ENTMCNC: 32.9 G/DL (ref 25.2–33.5)
MCV RBC AUTO: 93.6 FL (ref 82.6–102.9)
MONOCYTES NFR BLD: 1.67 K/UL (ref 0.1–1.2)
MONOCYTES NFR BLD: 4 % (ref 4–11)
MORPHOLOGY: ABNORMAL
NEUTROPHILS NFR BLD: 29 % (ref 43–77)
NEUTS SEG NFR BLD: 12.09 K/UL (ref 1.5–8.1)
NRBC BLD-RTO: 0 PER 100 WBC
PLATELET # BLD AUTO: 145 K/UL (ref 138–453)
PMV BLD AUTO: 10.9 FL (ref 8.1–13.5)
POTASSIUM SERPL-SCNC: 3.9 MMOL/L (ref 3.7–5.3)
PROT SERPL-MCNC: 6 G/DL (ref 6.6–8.7)
RBC # BLD AUTO: 3.57 M/UL (ref 3.95–5.11)
SODIUM SERPL-SCNC: 143 MMOL/L (ref 136–145)
WBC OTHER # BLD: 41.7 K/UL (ref 3.5–11.3)

## 2025-04-08 PROCEDURE — 97161 PT EVAL LOW COMPLEX 20 MIN: CPT

## 2025-04-08 PROCEDURE — 99231 SBSQ HOSP IP/OBS SF/LOW 25: CPT | Performed by: INTERNAL MEDICINE

## 2025-04-08 PROCEDURE — 1200000000 HC SEMI PRIVATE

## 2025-04-08 PROCEDURE — 2500000003 HC RX 250 WO HCPCS: Performed by: PHYSICIAN ASSISTANT

## 2025-04-08 PROCEDURE — 36415 COLL VENOUS BLD VENIPUNCTURE: CPT

## 2025-04-08 PROCEDURE — 2580000003 HC RX 258: Performed by: PHYSICIAN ASSISTANT

## 2025-04-08 PROCEDURE — 6370000000 HC RX 637 (ALT 250 FOR IP): Performed by: FAMILY MEDICINE

## 2025-04-08 PROCEDURE — 80053 COMPREHEN METABOLIC PANEL: CPT

## 2025-04-08 PROCEDURE — 85025 COMPLETE CBC W/AUTO DIFF WBC: CPT

## 2025-04-08 PROCEDURE — 97165 OT EVAL LOW COMPLEX 30 MIN: CPT | Performed by: OCCUPATIONAL THERAPIST

## 2025-04-08 PROCEDURE — 6370000000 HC RX 637 (ALT 250 FOR IP): Performed by: PHYSICIAN ASSISTANT

## 2025-04-08 RX ADMIN — APIXABAN 5 MG: 5 TABLET, FILM COATED ORAL at 09:06

## 2025-04-08 RX ADMIN — OXYCODONE HYDROCHLORIDE 2.5 MG: 5 TABLET ORAL at 09:06

## 2025-04-08 RX ADMIN — PANTOPRAZOLE SODIUM 40 MG: 40 TABLET, DELAYED RELEASE ORAL at 05:24

## 2025-04-08 RX ADMIN — HYDROCHLOROTHIAZIDE 25 MG: 25 TABLET ORAL at 09:06

## 2025-04-08 RX ADMIN — CARVEDILOL 6.25 MG: 3.12 TABLET, FILM COATED ORAL at 09:05

## 2025-04-08 RX ADMIN — SODIUM CHLORIDE, POTASSIUM CHLORIDE, SODIUM LACTATE AND CALCIUM CHLORIDE: 600; 310; 30; 20 INJECTION, SOLUTION INTRAVENOUS at 05:28

## 2025-04-08 RX ADMIN — SODIUM CHLORIDE, PRESERVATIVE FREE 10 ML: 5 INJECTION INTRAVENOUS at 20:35

## 2025-04-08 RX ADMIN — CARVEDILOL 6.25 MG: 3.12 TABLET, FILM COATED ORAL at 20:34

## 2025-04-08 RX ADMIN — APIXABAN 5 MG: 5 TABLET, FILM COATED ORAL at 20:34

## 2025-04-08 RX ADMIN — OXYCODONE HYDROCHLORIDE 2.5 MG: 5 TABLET ORAL at 02:16

## 2025-04-08 RX ADMIN — POLYETHYLENE GLYCOL 3350 17 G: 17 POWDER, FOR SOLUTION ORAL at 11:37

## 2025-04-08 ASSESSMENT — PAIN SCALES - WONG BAKER: WONGBAKER_NUMERICALRESPONSE: NO HURT

## 2025-04-08 ASSESSMENT — PAIN - FUNCTIONAL ASSESSMENT
PAIN_FUNCTIONAL_ASSESSMENT: ACTIVITIES ARE NOT PREVENTED
PAIN_FUNCTIONAL_ASSESSMENT: ACTIVITIES ARE NOT PREVENTED

## 2025-04-08 ASSESSMENT — PAIN SCALES - GENERAL
PAINLEVEL_OUTOF10: 2
PAINLEVEL_OUTOF10: 4
PAINLEVEL_OUTOF10: 1
PAINLEVEL_OUTOF10: 5

## 2025-04-08 ASSESSMENT — PAIN DESCRIPTION - DESCRIPTORS
DESCRIPTORS: SORE
DESCRIPTORS: SORE

## 2025-04-08 ASSESSMENT — PAIN DESCRIPTION - ORIENTATION
ORIENTATION: MID
ORIENTATION: MID

## 2025-04-08 ASSESSMENT — PAIN DESCRIPTION - LOCATION
LOCATION: ABDOMEN

## 2025-04-08 NOTE — CONSULTS
removal---1996, hysterectomy--              cervix unknown, removal  left-sided meningioma--2010, cataract---IOL----OU--2012,              colonoscopy--polypectomy--2006, PM injections--2011--bulging discs, joint replacement----              sternal fracture---1996    Attending Supervising Physician's Attestation Statement  I performed a history and physical examination on the patient and discussed the management with the nurse practitioner. I reviewed and agree with the findings and plan as documented in her note .    Electronically signed by Clint Richards MD on 4/8/25 at 1:40 PM EDT     Allergies:         amoxicillin, atorvastatin, doxycycline, Neurontin--gabapentin, guaifenesin,                           prednisone, Vicodin-Norco--hydrocodone  Intolerances:  Aggrenox--ASA-dipyridamole--headache      Plan:  Hypertension -- con't pain control, to receive her home coreg tonight, then prn IV hydralazine and monitor  CKD stage 3a -- con't IVF, will check labs now and in AM  IS   Home medications reviewed  DVT prophylaxis -- con't home Eliquis  6.   See orders    Note that over 45  minutes was spent in reviewing and obtaining history, physical examination and evaluation of the patient, placing orders, providing education, coordinating care, reviewing test results, documenting in the medical record, and discussion/communicating with patient/caregiver/family.    Electronically signed by MORALES Lucas - CNP, NP-C, FNP-BC on 4/7/2025 at 8:51 PM  Hospitalist/Nocturnist

## 2025-04-09 VITALS
TEMPERATURE: 98.4 F | OXYGEN SATURATION: 96 % | HEIGHT: 63 IN | HEART RATE: 58 BPM | SYSTOLIC BLOOD PRESSURE: 152 MMHG | DIASTOLIC BLOOD PRESSURE: 71 MMHG | BODY MASS INDEX: 32.69 KG/M2 | RESPIRATION RATE: 16 BRPM | WEIGHT: 184.5 LBS

## 2025-04-09 LAB
ANION GAP SERPL CALCULATED.3IONS-SCNC: 10 MMOL/L (ref 9–16)
BASOPHILS # BLD: 0 K/UL (ref 0–0.2)
BASOPHILS NFR BLD: 0 % (ref 0–1)
BUN SERPL-MCNC: 21 MG/DL (ref 8–23)
BUN/CREAT SERPL: 23 (ref 9–20)
CALCIUM SERPL-MCNC: 9.5 MG/DL (ref 8.6–10.4)
CHLORIDE SERPL-SCNC: 107 MMOL/L (ref 98–107)
CO2 SERPL-SCNC: 27 MMOL/L (ref 20–31)
CREAT SERPL-MCNC: 0.9 MG/DL (ref 0.6–0.9)
EOSINOPHIL # BLD: 0 K/UL (ref 0–0.4)
EOSINOPHILS RELATIVE PERCENT: 0 % (ref 1–7)
ERYTHROCYTE [DISTWIDTH] IN BLOOD BY AUTOMATED COUNT: 13.7 % (ref 11.8–14.4)
GFR, ESTIMATED: 61 ML/MIN/1.73M2
GLUCOSE SERPL-MCNC: 100 MG/DL (ref 74–99)
HCT VFR BLD AUTO: 35.2 % (ref 36.3–47.1)
HGB BLD-MCNC: 11.1 G/DL (ref 11.9–15.1)
IMM GRANULOCYTES # BLD AUTO: 0 K/UL (ref 0–0.3)
IMM GRANULOCYTES NFR BLD: 0 %
LYMPHOCYTES NFR BLD: 24.49 K/UL (ref 1–4.8)
LYMPHOCYTES RELATIVE PERCENT: 66 % (ref 16–46)
MAGNESIUM SERPL-MCNC: 1.8 MG/DL (ref 1.7–2.3)
MCH RBC QN AUTO: 30 PG (ref 25.2–33.5)
MCHC RBC AUTO-ENTMCNC: 31.5 G/DL (ref 25.2–33.5)
MCV RBC AUTO: 95.1 FL (ref 82.6–102.9)
MONOCYTES NFR BLD: 1.11 K/UL (ref 0.1–1.2)
MONOCYTES NFR BLD: 3 % (ref 4–11)
MORPHOLOGY: ABNORMAL
NEUTROPHILS NFR BLD: 31 % (ref 43–77)
NEUTS SEG NFR BLD: 11.5 K/UL (ref 1.5–8.1)
NRBC BLD-RTO: 0 PER 100 WBC
PLATELET # BLD AUTO: ABNORMAL K/UL (ref 138–453)
PLATELET, FLUORESCENCE: 143 K/UL (ref 138–453)
PLATELETS.RETICULATED NFR BLD AUTO: 6 % (ref 1.1–10.3)
POTASSIUM SERPL-SCNC: 3.8 MMOL/L (ref 3.7–5.3)
RBC # BLD AUTO: 3.7 M/UL (ref 3.95–5.11)
SODIUM SERPL-SCNC: 144 MMOL/L (ref 136–145)
SURGICAL PATHOLOGY REPORT: NORMAL
WBC OTHER # BLD: 37.1 K/UL (ref 3.5–11.3)

## 2025-04-09 PROCEDURE — 97116 GAIT TRAINING THERAPY: CPT | Performed by: PHYSICAL THERAPY ASSISTANT

## 2025-04-09 PROCEDURE — 6370000000 HC RX 637 (ALT 250 FOR IP): Performed by: PHYSICIAN ASSISTANT

## 2025-04-09 PROCEDURE — 2500000003 HC RX 250 WO HCPCS: Performed by: PHYSICIAN ASSISTANT

## 2025-04-09 PROCEDURE — 80048 BASIC METABOLIC PNL TOTAL CA: CPT

## 2025-04-09 PROCEDURE — 6370000000 HC RX 637 (ALT 250 FOR IP): Performed by: FAMILY MEDICINE

## 2025-04-09 PROCEDURE — 85025 COMPLETE CBC W/AUTO DIFF WBC: CPT

## 2025-04-09 PROCEDURE — 36415 COLL VENOUS BLD VENIPUNCTURE: CPT

## 2025-04-09 PROCEDURE — 99231 SBSQ HOSP IP/OBS SF/LOW 25: CPT | Performed by: INTERNAL MEDICINE

## 2025-04-09 PROCEDURE — 2500000003 HC RX 250 WO HCPCS: Performed by: INTERNAL MEDICINE

## 2025-04-09 PROCEDURE — 97110 THERAPEUTIC EXERCISES: CPT | Performed by: PHYSICAL THERAPY ASSISTANT

## 2025-04-09 PROCEDURE — 83735 ASSAY OF MAGNESIUM: CPT

## 2025-04-09 RX ORDER — OXYCODONE HYDROCHLORIDE 5 MG/1
2.5 TABLET ORAL EVERY 4 HOURS PRN
Qty: 15 TABLET | Refills: 0 | Status: SHIPPED | OUTPATIENT
Start: 2025-04-09 | End: 2025-04-14

## 2025-04-09 RX ADMIN — PANTOPRAZOLE SODIUM 40 MG: 40 TABLET, DELAYED RELEASE ORAL at 05:38

## 2025-04-09 RX ADMIN — MICONAZOLE NITRATE: 20 POWDER TOPICAL at 09:31

## 2025-04-09 RX ADMIN — POLYETHYLENE GLYCOL 3350 17 G: 17 POWDER, FOR SOLUTION ORAL at 08:28

## 2025-04-09 RX ADMIN — ONDANSETRON 4 MG: 4 TABLET, ORALLY DISINTEGRATING ORAL at 07:27

## 2025-04-09 RX ADMIN — HYDROCHLOROTHIAZIDE 25 MG: 25 TABLET ORAL at 08:28

## 2025-04-09 RX ADMIN — CARVEDILOL 6.25 MG: 3.12 TABLET, FILM COATED ORAL at 08:27

## 2025-04-09 RX ADMIN — APIXABAN 5 MG: 5 TABLET, FILM COATED ORAL at 08:28

## 2025-04-09 RX ADMIN — SODIUM CHLORIDE, PRESERVATIVE FREE 10 ML: 5 INJECTION INTRAVENOUS at 08:36

## 2025-04-09 NOTE — CARE COORDINATION
04/09/25 1225   IMM Letter   IMM Letter given to Patient/Family/Significant other/Guardian/POA/by: second IMM given to pt by KATHLEEN William RN   IMM Letter date given: 04/09/25   IMM Letter time given: 1225     IMM letter provided to patient.  Patient offered four hours to make informed decision regarding appeal process; patient agreeable to discharge.   
Case Management Assessment  Initial Evaluation    Date/Time of Evaluation: 4/8/2025 2:35 PM  Assessment Completed by: Ara William RN    If patient is discharged prior to next notation, then this note serves as note for discharge by case management.    Patient Name: Matilde Desai                   YOB: 1934  Diagnosis: Gallstone [K80.20]  RUQ abdominal pain [R10.11]  S/P laparoscopy with lysis of adhesions [Z98.890]                   Date / Time: 4/7/2025  8:21 AM    Patient Admission Status: Inpatient   Readmission Risk (Low < 19, Mod (19-27), High > 27): Readmission Risk Score: 13.5    Current PCP: Minoo Mendez MD  PCP verified by CM? Yes    Chart Reviewed: Yes      History Provided by: Patient  Patient Orientation: Alert and Oriented    Patient Cognition: Alert    Hospitalization in the last 30 days (Readmission):  No    If yes, Readmission Assessment in CM Navigator will be completed.    Advance Directives:      Code Status: DNR-CCA   Patient's Primary Decision Maker is:      Primary Decision Maker: Renuka Palacios - [phone]    Discharge Planning:    Patient lives with: Alone Type of Home: House  Primary Care Giver: Self  Patient Support Systems include: Family Members   Current Financial resources: Medicare  Current community resources: None  Current services prior to admission: None            Current DME:              Type of Home Care services:  None    ADLS  Prior functional level: Independent in ADLs/IADLs  Current functional level: Assistance with the following:, Mobility    PT AM-PAC:   /24  OT AM-PAC:   /24    Family can provide assistance at DC: Yes  Would you like Case Management to discuss the discharge plan with any other family members/significant others, and if so, who? No  Plans to Return to Present Housing: Yes  Other Identified Issues/Barriers to RETURNING to current housing: yes  Potential Assistance needed at discharge: Durable Medical Equipment, Skilled Nursing 
social security    ACP and Code Status:  SW discussed an Advance Directive which included the patient's choices for care and treatment in the case of a health event that adversely affects decision-making abilities. SW provided education and resources. Matilde Desai has no questions at this time and has agreed to keep me up-to-date should anything change.    Matilde Desai is a DNR-CCA status and has NO advanced directive - not interested in additional information.      Collaborative List of SNF/ECF/HH were provided: offered, declined at this time. No discharge order at this time.    Anticipated Needs/Discharge Plan:  Spoke with patient/family/representative about discharge plan. Patient/Family/Representative verbalizes understanding of the plan of care and denies discharge needs or further services at this time. SW provided business card. SW will continue to monitor needs and assist as appropriate.            Electronically signed by BLUE Braswell on 4/8/2025 at 12:24 PM

## 2025-04-09 NOTE — PLAN OF CARE
Problem: Discharge Planning  Goal: Discharge to home or other facility with appropriate resources  4/8/2025 0136 by Jan Roland RN  Outcome: Progressing  4/7/2025 1330 by Mariel Loo RN  Outcome: Progressing  Flowsheets (Taken 4/7/2025 1328)  Discharge to home or other facility with appropriate resources:   Identify barriers to discharge with patient and caregiver   Arrange for needed discharge resources and transportation as appropriate   Identify discharge learning needs (meds, wound care, etc)   Refer to discharge planning if patient needs post-hospital services based on physician order or complex needs related to functional status, cognitive ability or social support system     Problem: Pain  Goal: Verbalizes/displays adequate comfort level or baseline comfort level  4/8/2025 0136 by Jan Roland RN  Outcome: Progressing  4/7/2025 1330 by Mariel Loo RN  Outcome: Progressing     Problem: Safety - Adult  Goal: Free from fall injury  4/8/2025 0136 by Jan Roland RN  Outcome: Progressing  4/7/2025 1330 by Mariel Loo RN  Outcome: Progressing     Problem: ABCDS Injury Assessment  Goal: Absence of physical injury  4/8/2025 0136 by Jan Roland RN  Outcome: Progressing  4/7/2025 1330 by Mariel Loo RN  Outcome: Progressing     Problem: Gastrointestinal - Adult  Goal: Minimal or absence of nausea and vomiting  4/8/2025 0136 by Jan Roland RN  Outcome: Progressing  4/7/2025 1330 by Mariel Loo RN  Outcome: Progressing  Goal: Maintains or returns to baseline bowel function  4/8/2025 0136 by Jan Roland RN  Outcome: Progressing  4/7/2025 1330 by Mariel Loo RN  Outcome: Progressing  Goal: Maintains adequate nutritional intake  4/8/2025 0136 by Jan Roland RN  Outcome: Progressing  4/7/2025 1330 by Mariel Loo RN  Outcome: Progressing     Problem: Skin/Tissue Integrity - Adult  Goal: Skin integrity remains intact  Outcome: Progressing  Goal: Incisions, wounds, 
  Problem: Discharge Planning  Goal: Discharge to home or other facility with appropriate resources  4/8/2025 0917 by Mariel Loo RN  Outcome: Progressing  Flowsheets (Taken 4/8/2025 0915)  Discharge to home or other facility with appropriate resources:   Identify barriers to discharge with patient and caregiver   Arrange for needed discharge resources and transportation as appropriate   Identify discharge learning needs (meds, wound care, etc)   Refer to discharge planning if patient needs post-hospital services based on physician order or complex needs related to functional status, cognitive ability or social support system  4/8/2025 0136 by Jan Roland RN  Outcome: Progressing     Problem: Pain  Goal: Verbalizes/displays adequate comfort level or baseline comfort level  4/8/2025 0917 by Mariel Loo RN  Outcome: Progressing  4/8/2025 0136 by Jan Roland RN  Outcome: Progressing     Problem: Safety - Adult  Goal: Free from fall injury  4/8/2025 0917 by Mariel Loo RN  Outcome: Progressing  4/8/2025 0136 by Jan Roland RN  Outcome: Progressing     Problem: ABCDS Injury Assessment  Goal: Absence of physical injury  4/8/2025 0917 by Mariel Loo RN  Outcome: Progressing  4/8/2025 0136 by Jan Roland RN  Outcome: Progressing     Problem: Gastrointestinal - Adult  Goal: Minimal or absence of nausea and vomiting  4/8/2025 0917 by Mariel Loo RN  Outcome: Progressing  Flowsheets (Taken 4/8/2025 0915)  Minimal or absence of nausea and vomiting:   Administer IV fluids as ordered to ensure adequate hydration   Provide nonpharmacologic comfort measures as appropriate   Advance diet as tolerated, if ordered  4/8/2025 0136 by Jan Roland RN  Outcome: Progressing  Goal: Maintains or returns to baseline bowel function  4/8/2025 0917 by Mariel Loo RN  Outcome: Progressing  Flowsheets (Taken 4/8/2025 0915)  Maintains or returns to baseline bowel function:   Assess bowel function   
  Problem: Discharge Planning  Goal: Discharge to home or other facility with appropriate resources  Outcome: Adequate for Discharge     Problem: Pain  Goal: Verbalizes/displays adequate comfort level or baseline comfort level  Outcome: Adequate for Discharge     Problem: Safety - Adult  Goal: Free from fall injury  Outcome: Adequate for Discharge     Problem: ABCDS Injury Assessment  Goal: Absence of physical injury  Outcome: Adequate for Discharge     Problem: Gastrointestinal - Adult  Goal: Minimal or absence of nausea and vomiting  Outcome: Adequate for Discharge  Goal: Maintains or returns to baseline bowel function  Outcome: Adequate for Discharge  Goal: Maintains adequate nutritional intake  Outcome: Adequate for Discharge     Problem: Skin/Tissue Integrity - Adult  Goal: Skin integrity remains intact  Outcome: Adequate for Discharge  Goal: Incisions, wounds, or drain sites healing without S/S of infection  Outcome: Adequate for Discharge     
  Problem: Discharge Planning  Goal: Discharge to home or other facility with appropriate resources  Outcome: Progressing  Flowsheets (Taken 4/7/2025 1329)  Discharge to home or other facility with appropriate resources:   Identify barriers to discharge with patient and caregiver   Arrange for needed discharge resources and transportation as appropriate   Identify discharge learning needs (meds, wound care, etc)   Refer to discharge planning if patient needs post-hospital services based on physician order or complex needs related to functional status, cognitive ability or social support system     Problem: Pain  Goal: Verbalizes/displays adequate comfort level or baseline comfort level  Outcome: Progressing     Problem: Safety - Adult  Goal: Free from fall injury  Outcome: Progressing     Problem: ABCDS Injury Assessment  Goal: Absence of physical injury  Outcome: Progressing     Problem: Gastrointestinal - Adult  Goal: Minimal or absence of nausea and vomiting  Outcome: Progressing  Goal: Maintains or returns to baseline bowel function  Outcome: Progressing  Goal: Maintains adequate nutritional intake  Outcome: Progressing     
DAILY: Assess and document risk factors for pressure ulcer development

## 2025-04-09 NOTE — PROGRESS NOTES
CLINICAL PHARMACY NOTE: MEDS TO BEDS    Total # of Prescriptions Filled: 1   The following medications were delivered to the patient:  oxycodone    Additional Documentation:   
Hospitalist Progress Note    Patient:  Matilde Desai     YOB: 1934    MRN: [id number]   Admit date: 4/7/2025     Acct: [id number]     PCP: Minoo Mendez MD    CC--Interval History: POD 2 LRA cholecystectomy and GISELLE---4.7.2025--Torres---home---4.9.2025    CLL---elevated WBC---41.7 ---> 37.1    HTN---185/64    See note below     All other ROS negative except noted in HPI    Diet:  ADULT DIET; Regular; Low Fat (less than or equal to 50 gm/day)    Medications:  Scheduled Meds:   miconazole   Topical BID    sodium chloride flush  10 mL IntraVENous 2 times per day    polyethylene glycol  17 g Oral Daily    apixaban  5 mg Oral BID    carvedilol  6.25 mg Oral BID    hydroCHLOROthiazide  25 mg Oral Daily    pantoprazole  40 mg Oral QAM AC    latanoprost  1 drop Both Eyes Nightly    Netarsudil Dimesylate  1 drop Left Eye QPM     Continuous Infusions:   sodium chloride       PRN Meds:sodium chloride flush, sodium chloride, ondansetron **OR** ondansetron, oxyCODONE **OR** oxyCODONE, morphine, hydrALAZINE, acetaminophen    Objective:  Labs:  CBC with Differential:    Lab Results   Component Value Date/Time    WBC 37.1 04/09/2025 05:27 AM    RBC 3.70 04/09/2025 05:27 AM    RBC 4.35 03/20/2024 05:04 PM    HGB 11.1 04/09/2025 05:27 AM    HCT 35.2 04/09/2025 05:27 AM    PLT See Reflexed IPF Result 04/09/2025 05:27 AM    MCV 95.1 04/09/2025 05:27 AM    MCH 30.0 04/09/2025 05:27 AM    MCHC 31.5 04/09/2025 05:27 AM    RDW 13.7 04/09/2025 05:27 AM    BANDSPCT 0 01/29/2025 02:55 PM    LYMPHOPCT 66 04/09/2025 05:27 AM    LYMPHOPCT 88 01/29/2025 02:55 PM    MONOPCT 3 04/09/2025 05:27 AM    EOSPCT 0 04/09/2025 05:27 AM    EOSPCT 0 01/29/2025 02:55 PM    BASOPCT 0 04/09/2025 05:27 AM    BASOPCT 0 01/29/2025 02:55 PM    MONOSABS 1.11 04/09/2025 05:27 AM    LYMPHSABS 24.49 04/09/2025 05:27 AM    EOSABS 0.00 04/09/2025 05:27 AM    BASOSABS 0.00 04/09/2025 05:27 AM    DIFFTYPE NOT REPORTED 07/15/2020 03:19 PM     BMP:    Lab 
Hospitalist Progress Note    Patient:  Matilde Desai     YOB: 1934    MRN: [id number]   Admit date: 4/7/2025     Acct: [id number]     PCP: Minoo Mendez MD    CC--Interval History: POD 1 LRA cholecystectomy---GISELLE---4.7.2025--doing well POD 1    CLL---WBC---52.4 ---> 41.7    HTN--129/47    See note below    All other ROS negative except noted in HPI    Diet:  ADULT DIET; Regular; Low Fat (less than or equal to 50 gm/day)    Medications:  Scheduled Meds:   sodium chloride flush  10 mL IntraVENous 2 times per day    polyethylene glycol  17 g Oral Daily    apixaban  5 mg Oral BID    carvedilol  6.25 mg Oral BID    hydroCHLOROthiazide  25 mg Oral Daily    pantoprazole  40 mg Oral QAM AC    latanoprost  1 drop Both Eyes Nightly    Netarsudil Dimesylate  1 drop Left Eye QPM     Continuous Infusions:   sodium chloride       PRN Meds:sodium chloride flush, sodium chloride, ondansetron **OR** ondansetron, oxyCODONE **OR** oxyCODONE, morphine, hydrALAZINE, acetaminophen    Objective:  Labs:  CBC with Differential:    Lab Results   Component Value Date/Time    WBC 41.7 04/08/2025 05:33 AM    RBC 3.57 04/08/2025 05:33 AM    RBC 4.35 03/20/2024 05:04 PM    HGB 11.0 04/08/2025 05:33 AM    HCT 33.4 04/08/2025 05:33 AM     04/08/2025 05:33 AM    MCV 93.6 04/08/2025 05:33 AM    MCH 30.8 04/08/2025 05:33 AM    MCHC 32.9 04/08/2025 05:33 AM    RDW 13.6 04/08/2025 05:33 AM    BANDSPCT 0 01/29/2025 02:55 PM    LYMPHOPCT 67 04/08/2025 05:33 AM    LYMPHOPCT 88 01/29/2025 02:55 PM    MONOPCT 4 04/08/2025 05:33 AM    EOSPCT 0 04/08/2025 05:33 AM    EOSPCT 0 01/29/2025 02:55 PM    BASOPCT 0 04/08/2025 05:33 AM    BASOPCT 0 01/29/2025 02:55 PM    MONOSABS 1.67 04/08/2025 05:33 AM    LYMPHSABS 27.94 04/08/2025 05:33 AM    EOSABS 0.00 04/08/2025 05:33 AM    BASOSABS 0.00 04/08/2025 05:33 AM    DIFFTYPE NOT REPORTED 07/15/2020 03:19 PM     BMP:    Lab Results   Component Value Date/Time     04/08/2025 05:33 AM    K 
Physical Therapy  Facility/Department: [facility]  Daily Treatment Note  NAME: Matilde Desai  : 1934  MRN: [id number]    Date of Service: 2025    Discharge Recommendations:  Home with assist PRN, 24 hour supervision or assist, Continue to assess pending progress        Patient Diagnosis(es): The primary encounter diagnosis was Post-operative pain. Diagnoses of Gallstone and RUQ abdominal pain were also pertinent to this visit.    Assessment  Activity Tolerance: Patient limited by endurance;Patient tolerated treatment well    Plan  Physical Therapy Plan  General Plan: 5-7 times per week  Current Treatment Recommendations: Strengthening;ROM;Balance training;Functional mobility training;Transfer training;Endurance training;Gait training;Stair training;Neuromuscular re-education;Home exercise program;Safety education & training;Patient/Caregiver education & training;Equipment evaluation, education, & procurement    Restrictions  Restrictions/Precautions  Restrictions/Precautions: Surgical Protocols     Subjective   Subjective  Subjective: Pt in bedside chair at initiation of session. Agreeable to therapy at this time.  Pain: No pain, abdominal discomfort only.    Objective  Vitals     Bed Mobility Training  Bed Mobility Training: No  Balance  Sitting: Intact  Standing: High guard  Transfer Training  Transfer Training: Yes  Overall Level of Assistance: Contact guard assistance;Stand by assistance  Sit to Stand: Stand by assistance;Contact guard assistance  Stand to Sit: Stand by assistance;Contact guard assistance  Gait  Gait Training: Yes  Overall Level of Assistance: Stand by assistance;Contact guard assistance  Distance (ft): 150 Feet  Assistive Device: None  Speed/Jenny: Slow  Step Length: Left shortened;Right shortened  Stairs - Level of Assistance: Contact guard assistance  Number of Stairs Trained: 4     PT Exercises  Exercise Treatment: Sitting: HR,TR, marching x20 ea  Resistive Exercises: 
Physical Therapy  Facility/Department: [facility]  Physical Therapy Initial Assessment    Name: Matilde Desai  : 1934  MRN: [id number]  Date of Service: 2025    Discharge Recommendations:  Home with assist PRN, 24 hour supervision or assist, Continue to assess pending progress          Patient Diagnosis(es): Diagnoses of Gallstone and RUQ abdominal pain were pertinent to this visit.  Past Medical History:  has a past medical history of Cancer (HCC), Chronic lymphatic leukemia (HCC), Glaucoma, Hyperlipidemia, Hypertension, Lumbar spinal stenosis, Meningioma (HCC), Pulmonary embolism, PVD (peripheral vascular disease), Thyroid nodule, and Valvular heart disease.  Past Surgical History:  has a past surgical history that includes Knee arthroscopy (); cyst removal (); Hysterectomy; brain surgery (); Cataract removal (Bilateral, ); Colonoscopy (2006); brain surgery (Left); other surgical history (); and Cholecystectomy (N/A, 2025).    Assessment  Body Structures, Functions, Activity Limitations Requiring Skilled Therapeutic Intervention: Decreased functional mobility ;Decreased ADL status;Decreased ROM;Decreased tolerance to work activity;Decreased strength;Decreased posture;Increased pain;Decreased balance;Decreased endurance;Decreased safe awareness  Therapy Prognosis: Good  Decision Making: Low Complexity  Activity Tolerance  Activity Tolerance: Patient tolerated evaluation without incident    Plan  Physical Therapy Plan  General Plan: 5-7 times per week  Current Treatment Recommendations: Strengthening, ROM, Balance training, Functional mobility training, Transfer training, Endurance training, Gait training, Stair training, Neuromuscular re-education, Home exercise program, Safety education & training, Patient/Caregiver education & training, Equipment evaluation, education, & procurement  Safety Devices  Type of Devices: All fall risk precautions in 
    Patient Interventions include: Facilitated expression of thoughts and feelings  Family/Friends Interventions include: No family/friends present    Patient Plan of Care: Spiritual Care available upon further referral  Family/Friends Plan of Care: No family/friends present    Electronically signed by Chaplain Augustin on 4/9/2025 at 10:57 AM  
Responsibility: Primary  Shopping Responsibility: Primary  Prior Level of Assist for Transfers: Independent  Active : Yes  Mode of Transportation: SUV, Truck  Occupation: Retired    Objective  Temp: 98.3 °F (36.8 °C)  Pulse: 61  Heart Rate Source: Telemetry  Respirations: 18  SpO2: 96 %  O2 Device: None (Room air)  BP: (!) 151/53  MAP (Calculated): 86  BP Location: Left Arm  BP Method: Automatic  Patient Position: Sitting;Up in chair  Vision  Vision: Impaired  Vision Exceptions: Wears glasses at all times  Hearing  Hearing: Within functional limits       Observation/Palpation  Posture: Fair  Safety Devices  Type of Devices: Call light within reach;Bed alarm in place;Left in bed           ADL  LE Dressing: Independent;Increased time to complete  Putting On/Taking Off Footwear: Independent;Increased time to complete  Toileting: Modified independent ;Based on clinical judgement  Product Used : Soap and water     Activity Tolerance  Activity Tolerance: Patient tolerated evaluation without incident  Bed mobility  Supine to Sit: Independent  Sit to Supine: Independent  Scooting: Independent  Transfers  Sit to stand: Modified independent  Stand to sit: Modified independent     Cognition  Overall Cognitive Status: WFL  Orientation  Overall Orientation Status: Within Functional Limits         Goals  Short Term Goals  Time Frame for Short Term Goals: eval only      Therapy Time   Individual Concurrent Group Co-treatment   Time In 1345         Time Out 1400         Minutes 15                 HUAN MEYER OTR, OT     
      ASSESSMENT/PLAN:    ICD-10-CM    1. Gallstone  K80.20 Surgical Pathology     Surgical Pathology      2. RUQ abdominal pain  R10.11 Surgical Pathology     Surgical Pathology         89-year-old female who underwentLaparoscopic robotic assisted cholecystectomy with GISELLE ( over 50% of the procedure was taking down adhesions on 4/7/2025 with Dr. Torres    Patient is doing well, tolerating a diet does have some fatigue and complains of difficulty due to the pain getting up and to the bathroom back, does not have a lot of help at home, therefore going to get PT and OT to evaluate the patient and then we will plan on hopeful discharge home tomorrow, continue regular low-fat diet, 10 pound lifting restriction, follow-up in 1 week with Robert Munroe as an outpatient on discharge.      Robert Munroe PA-C,   Electronically signed 4/8/2025 at 1:52 PM

## 2025-04-09 NOTE — DISCHARGE INSTRUCTIONS
Follow up with Dr. Mendez in 1 week.    Please call to schedule this appointment [phone]     Use oxycodone for pain sparingly and only as needed per the prescription  Avoid fatty foods, expect to have some diarrhea the first 2 weeks, then advance diet as tolerated  may shower and get wet, do not remove steristrips, no tub baths/soaking for 2 weeks  No lifting more than 10 pounds for 4 weeks  Follow up with Robert Munroe PA-C in 1-2 weeks

## 2025-04-09 NOTE — DISCHARGE SUMMARY
Caps  Take 500 mg by mouth 2 times daily     hydroCHLOROthiazide 25 MG tablet  Commonly known as: HYDRODIURIL  Take 1 tablet by mouth daily     Lumigan 0.01 % Soln ophthalmic drops  Generic drug: bimatoprost     omeprazole 40 MG delayed release capsule  Commonly known as: PRILOSEC  Take 1 capsule by mouth every morning (before breakfast)     ophthalmic solution Rhopressa 0.02 % Soln  Generic drug: Netarsudil Dimesylate     vitamin D 50 MCG (2000 UT) Caps capsule  Commonly known as: CHOLECALCIFEROL               Where to Get Your Medications        These medications were sent to [facility] - [place], [place] - [address] [place] [address] -  [phone] - F [phone]  [address] [place], [place] [address]      Phone: [phone]   oxyCODONE 5 MG immediate release tablet         Diet: General/Regular diet as tolerated    Activity: no lifting more than 10 lbs for next 4 weeks      Follow-up:  in the next few weeks with Minoo Mendez MD  Follow up with Robert Munroe PA-C in 1 -2 weeks.    Robert Munroe PA-C,   Electronincally signed 4/9/2025 at 11:14 AM    A total of 35 minutes was spent in preparing the patient for discharge with greater than 50% of the time involved with education, counseling and coordinating care

## 2025-04-10 ENCOUNTER — CARE COORDINATION (OUTPATIENT)
Dept: CARE COORDINATION | Age: 89
End: 2025-04-10

## 2025-04-10 NOTE — CARE COORDINATION
[facility] Pharmacist?: No  Have you scheduled your follow up appointment?: Yes  How are you going to get to your appointment?: Car - family or friend to transport  Do you feel like you have everything you need to keep you well at home?: Yes  Care Transitions Interventions          Follow Up Appointment:   Patient does not have a follow up appointment scheduled at time of call. CTN scheduled patient an appointment within 7 days of discharge.   Future Appointments         Provider Specialty Dept Phone    4/16/2025 1:20 PM Minoo Mendez MD Family Medicine [phone]    4/21/2025 10:00 AM Robert Munroe PA-C General Surgery [phone]    6/11/2025 1:40 PM Minoo Mendez MD Family Medicine [phone]            Care Transition Nurse provided contact information.  Plan for follow-up call in 6-10 days based on severity of symptoms and risk factors.  Plan for next call: symptom management-follow up on recovery from choley      ARIANNE JOVEL RN

## 2025-04-15 ENCOUNTER — CARE COORDINATION (OUTPATIENT)
Dept: CARE COORDINATION | Age: 89
End: 2025-04-15

## 2025-04-16 ENCOUNTER — OFFICE VISIT (OUTPATIENT)
Dept: FAMILY MEDICINE CLINIC | Age: 89
End: 2025-04-16

## 2025-04-16 VITALS
OXYGEN SATURATION: 99 % | DIASTOLIC BLOOD PRESSURE: 76 MMHG | WEIGHT: 173 LBS | BODY MASS INDEX: 30.65 KG/M2 | HEART RATE: 70 BPM | SYSTOLIC BLOOD PRESSURE: 126 MMHG

## 2025-04-16 DIAGNOSIS — I10 PRIMARY HYPERTENSION: ICD-10-CM

## 2025-04-16 DIAGNOSIS — Z09 HOSPITAL DISCHARGE FOLLOW-UP: Primary | ICD-10-CM

## 2025-04-16 DIAGNOSIS — Z86.711 HISTORY OF PULMONARY EMBOLISM: ICD-10-CM

## 2025-04-16 DIAGNOSIS — K59.04 CHRONIC IDIOPATHIC CONSTIPATION: ICD-10-CM

## 2025-04-16 NOTE — PROGRESS NOTES
Post-Discharge Transitional Care  Follow Up      Matilde Desai   YOB: 1934    Date of Office Visit:  4/16/2025  Date of Hospital Admission: 4/7/25  Date of Hospital Discharge: 4/9/25  Risk of hospital readmission (high >=14%. Medium >=10%) :Readmission Risk Score: 12.9      Care management risk score Rising risk (score 2-5) and Complex Care (Scores >=6): No Risk Score On File     Non face to face  following discharge, date last encounter closed (first attempt may have been earlier): 04/10/2025    Call initiated 2 business days of discharge: Yes    ASSESSMENT/PLAN:   Hospital discharge follow-up  -     [place] DISCHARGE MEDS RECONCILED W/ CURRENT OUTPATIENT MED LIST    Patient following up s/p laparoscopic cholecystectomy. She is doing well at today's appointment. Has not needed to use oxycodone for pain control. Has some abdominal tenderness without nausea or vomiting. Patient is constipated. Advised that she may have 1/3 of the bran cereal daily to help regulate bowel movements. If not having regular bowel movements, informed to try OTC Milk of Magensia. Patient also advised to drink lots of fluids and maintain movement to assist with bowel movement.      Medical Decision Making: moderate complexity  No follow-ups on file.           Subjective:   HPI:  Follow up of Hospital problems/diagnosis(es): Patient is a 89 yo F seen today for hospital follow- up for elective laparoscopic robotic assisted cholecystectomy with GISELLE. Patient tolerated procedure well and was discharged with oxycodone 2.5 mg qid prn for pain control.      Inpatient course: Discharge summary reviewed- see chart.    Interval history/Current status: Today, patient states that she feels bloated. Endorsing some abdominal pain, but has not taken any oxycodone. Has had two bowel movements since discharge. Passing some flatulence. Denies nausea, vomiting. Patient has tried a Bran cereal with high fiber content which helped her have a bowel

## 2025-04-21 ENCOUNTER — OFFICE VISIT (OUTPATIENT)
Dept: SURGERY | Age: 89
End: 2025-04-21
Payer: MEDICARE

## 2025-04-21 VITALS
TEMPERATURE: 97.5 F | BODY MASS INDEX: 30.9 KG/M2 | SYSTOLIC BLOOD PRESSURE: 120 MMHG | OXYGEN SATURATION: 98 % | DIASTOLIC BLOOD PRESSURE: 72 MMHG | HEART RATE: 67 BPM | WEIGHT: 174.4 LBS | HEIGHT: 63 IN

## 2025-04-21 DIAGNOSIS — Z48.89 POSTOPERATIVE VISIT: Primary | ICD-10-CM

## 2025-04-21 PROCEDURE — 99212 OFFICE O/P EST SF 10 MIN: CPT | Performed by: PHYSICIAN ASSISTANT

## 2025-04-21 PROCEDURE — 99024 POSTOP FOLLOW-UP VISIT: CPT | Performed by: PHYSICIAN ASSISTANT

## 2025-04-21 NOTE — PROGRESS NOTES
Subjective   Matilde Desai is a 89 y.o. female who presents today for post operative visit after having lap harpreet done on 4/7/25 by Dr. Torres. Currently state pain is 1, and is using 0 for pain. Incision sites have no drainage or redness or swelling or tenderness around them. Patient states is having regular bowel movements.  Is eating and drinking without difficulty. 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    Pulmonary embolism     PVD (peripheral vascular disease)     Thyroid nodule     Valvular heart disease        Past Surgical History:   Procedure Laterality Date    BRAIN SURGERY  2010    meningioma    BRAIN SURGERY Left     to remove meningioma    CATARACT REMOVAL Bilateral 2012    CHOLECYSTECTOMY N/A 4/7/2025    Laparoscopic lysis of adhesions &  robotic assisted cholecystectomy performed by Clint Torres MD at [facility] OR    COLONOSCOPY  04/2006    with polypectomy    CYST REMOVAL  1996    pancreatic    HYSTERECTOMY (CERVIX STATUS UNKNOWN)      KNEE ARTHROSCOPY  1999    OTHER SURGICAL HISTORY  2011    injections for bulging discs       Current Outpatient Medications   Medication Sig Dispense Refill    ophthalmic solution Netarsudil Dimesylate (RHOPRESSA) 0.02 % SOLN Place 1 drop into the left eye every evening      omeprazole (PRILOSEC) 40 MG delayed release capsule Take 1 capsule by mouth every morning (before breakfast) 30 capsule 0    apixaban (ELIQUIS) 5 MG TABS tablet TAKE 1 TABLET TWICE DAILY 180 tablet 3    carvedilol (COREG) 6.25 MG tablet TAKE 1 TABLET TWICE DAILY 180 tablet 3    hydroCHLOROthiazide (HYDRODIURIL) 25 MG

## 2025-04-22 ENCOUNTER — CARE COORDINATION (OUTPATIENT)
Dept: CARE COORDINATION | Age: 89
End: 2025-04-22

## 2025-04-22 NOTE — CARE COORDINATION
Care Transitions Note    Follow Up Call     Patient Current Location:  [place]    Care Transition Nurse contacted the family, daughter Renuka  by telephone. Verified name and  as identifiers.    Additional needs identified to be addressed with provider   No needs identified                 Method of communication with provider: none.    Care Summary Note: Per daughter Renuka, patient doing very well. -Had surgeon follow up yesterday, continues with weight limitations for 2 more weeks of nothing over 10 lbs.  -Eating and drinking well, taking bran for bowels so stool is better, no n/v, very little incisional pain, takes Tylenol rarely.  -Patient lives alone but family checks on her frequently. She is ambulating well, no new concerns, denied weakness.    Plan of care updates since last contact:  Review of patient management of conditions/medications: Educated to monitor for any symptoms of infection, any n/v/d.       Advance Care Planning:   Does patient have an Advance Directive: deferred at this time, will discuss on future follow up. .    Medication Review:  Full medication reconciliation completed during previous call.    Remote Patient Monitoring:  Offered patient enrollment in the Remote Patient Monitoring (RPM) program for in-home monitoring: Yes, but did not enroll at this time: controlled chronic disease management.    Assessments:  Care Transitions Subsequent and Final Call    Schedule Follow Up Appointment with PCP: Completed  Subsequent and Final Calls  Do you have any ongoing symptoms?: No  Have your medications changed?: No  Do you have any questions related to your medications?: No  Do you currently have any active services?: No  Do you have any needs or concerns that I can assist you with?: No  Identified Barriers: None  Care Transitions Interventions  No Identified Needs  Other Interventions:              Follow Up Appointment:   Reviewed upcoming appointment(s). and MONIKA appointment attended as scheduled

## 2025-06-11 ENCOUNTER — OFFICE VISIT (OUTPATIENT)
Dept: FAMILY MEDICINE CLINIC | Age: 89
End: 2025-06-11
Payer: MEDICARE

## 2025-06-11 VITALS
SYSTOLIC BLOOD PRESSURE: 126 MMHG | HEART RATE: 71 BPM | BODY MASS INDEX: 31.18 KG/M2 | OXYGEN SATURATION: 97 % | DIASTOLIC BLOOD PRESSURE: 74 MMHG | WEIGHT: 176 LBS

## 2025-06-11 DIAGNOSIS — I10 PRIMARY HYPERTENSION: Primary | ICD-10-CM

## 2025-06-11 DIAGNOSIS — E78.2 MIXED HYPERLIPIDEMIA: ICD-10-CM

## 2025-06-11 DIAGNOSIS — Z86.711 HISTORY OF PULMONARY EMBOLISM: ICD-10-CM

## 2025-06-11 DIAGNOSIS — F41.1 GENERALIZED ANXIETY DISORDER: ICD-10-CM

## 2025-06-11 PROCEDURE — 99213 OFFICE O/P EST LOW 20 MIN: CPT | Performed by: FAMILY MEDICINE

## 2025-06-11 NOTE — PROGRESS NOTES
[facility] [place]  [address], [address]  [place] [id number]  Dept: [phone]  Dept Fax:[phone]    Matilde Desai is a 89 y.o. female who presents today for her medical conditions/complaints as notedbelow.        Assessment/Plan:     Assessment & Plan  1. Routine checkup.  - Blood pressure readings are within the normal range.  - Slight weight increase, likely due to improved appetite after gastrointestinal discomfort.  - Encouraged to maintain a diet rich in fruits and vegetables and to continue gardening activities with adequate rest and hydration, especially in hot weather.  - Discussed the safety and efficacy of the COVID-19 vaccine, concluding it does not pose any harm. Scheduled for blood work in 07/2025, with results to be reviewed upon receipt.    2. Post-eye procedure status.  - Reports no pain in the eye but experiences watering when lying down.  - Advised to continue using eye drops as prescribed but to avoid using them before activities requiring clear vision, such as driving.    Follow-up  The patient will follow up for her wellness visit.      Assessment & Plan  Primary hypertension   Chronic, at goal (stable), continue current treatment plan    Mixed hyperlipidemia   Chronic, at goal (stable), continue current treatment plan    History of pulmonary embolism   Continue with the daily eliquis  Generalized anxiety disorder   Controlled now with pain overall improved.           Results      Lab Results   Component Value Date    WBC 37.1 (HH) 04/09/2025    HGB 11.1 (L) 04/09/2025    HCT 35.2 (L) 04/09/2025    PLT See Reflexed IPF Result 04/09/2025    CHOL 237 02/26/2016    TRIG 355 02/26/2016    HDL 35 02/26/2016    ALT 40 (H) 04/08/2025    AST 35 04/08/2025     04/09/2025    K 3.8 04/09/2025     04/09/2025    CREATININE 0.9 04/09/2025    BUN 21 04/09/2025    CO2 27 04/09/2025    TSH 0.98 05/06/2020       No follow-ups on file.      Subjective:

## 2025-07-16 RX ORDER — HYDROCHLOROTHIAZIDE 25 MG/1
25 TABLET ORAL DAILY
Qty: 90 TABLET | Refills: 3 | Status: SHIPPED | OUTPATIENT
Start: 2025-07-16

## 2025-07-16 NOTE — TELEPHONE ENCOUNTER
Matilde Desai is requesting a refill on the following medication(s):  Requested Prescriptions     Pending Prescriptions Disp Refills    hydroCHLOROthiazide (HYDRODIURIL) 25 MG tablet [Pharmacy Med Name: HYDROCHLOROTHIAZIDE 25 MG Oral Tablet] 90 tablet 3     Sig: TAKE 1 TABLET EVERY DAY       Last Visit Date (If Applicable):  6/11/2025      Next Visit Date:    10/30/2025

## (undated) DEVICE — Device

## (undated) DEVICE — SUTURE MONOCRYL SZ 4-0 L18IN ABSRB UD L19MM PS-2 3/8 CIR PRIM Y496G

## (undated) DEVICE — TROCAR: Brand: KII FIOS FIRST ENTRY

## (undated) DEVICE — TROCAR: Brand: KII® SLEEVE

## (undated) DEVICE — ARM DRAPE

## (undated) DEVICE — ANCHOR TISSUE RETRIEVAL SYSTEM, BAG SIZE 125 ML, PORT SIZE 8 MM: Brand: ANCHOR TISSUE RETRIEVAL SYSTEM

## (undated) DEVICE — PAD,ARMBOARD,CONV,FOAM,2X8X20",12PR/CS: Brand: MEDLINE

## (undated) DEVICE — TUBING SET, SUCTION LAP, S-PILOT: Brand: N.A.

## (undated) DEVICE — SEALER LAP L37CM MARYLAND JAW OPN NANO COAT MULTIFUNCTIONAL

## (undated) DEVICE — MDHZ GEN LAPAROSCOPY&ROBOT: Brand: MEDLINE INDUSTRIES, INC.

## (undated) DEVICE — STRIP,CLOSURE,WOUND,MEDI-STRIP,1/2X4: Brand: MEDLINE

## (undated) DEVICE — GAUZE,SPONGE,2"X2",8PLY,STERILE,LF,2'S: Brand: MEDLINE

## (undated) DEVICE — COLUMN DRAPE

## (undated) DEVICE — BLADELESS OBTURATOR: Brand: WECK VISTA

## (undated) DEVICE — INSUFFLATION TUBING SET, ENDOFLATOR 50: Brand: N.A.

## (undated) DEVICE — MASTISOL ADHESIVE LIQ 2/3ML

## (undated) DEVICE — SCISSOR SURG CRV ENDOCUT TIP FOR LAP DISP

## (undated) DEVICE — DRESSING TRNSPAR W2XL2.75IN FLM SHT SEMIPERMEABLE WIND

## (undated) DEVICE — SEAL